# Patient Record
Sex: MALE | Race: WHITE | Employment: OTHER | URBAN - METROPOLITAN AREA
[De-identification: names, ages, dates, MRNs, and addresses within clinical notes are randomized per-mention and may not be internally consistent; named-entity substitution may affect disease eponyms.]

---

## 2018-05-18 ENCOUNTER — OFFICE VISIT (OUTPATIENT)
Dept: INTERNAL MEDICINE CLINIC | Age: 73
End: 2018-05-18

## 2018-05-18 ENCOUNTER — HOSPITAL ENCOUNTER (OUTPATIENT)
Dept: GENERAL RADIOLOGY | Age: 73
Discharge: HOME | End: 2018-05-18
Attending: INTERNAL MEDICINE
Payer: MEDICARE

## 2018-05-18 VITALS
WEIGHT: 165.38 LBS | DIASTOLIC BLOOD PRESSURE: 85 MMHG | HEART RATE: 56 BPM | BODY MASS INDEX: 24.5 KG/M2 | OXYGEN SATURATION: 97 % | HEIGHT: 69 IN | SYSTOLIC BLOOD PRESSURE: 138 MMHG | RESPIRATION RATE: 18 BRPM | TEMPERATURE: 98.6 F

## 2018-05-18 DIAGNOSIS — M54.42 CHRONIC LEFT-SIDED LOW BACK PAIN WITH LEFT-SIDED SCIATICA: ICD-10-CM

## 2018-05-18 DIAGNOSIS — G89.29 CHRONIC LEFT-SIDED LOW BACK PAIN WITH LEFT-SIDED SCIATICA: Primary | ICD-10-CM

## 2018-05-18 DIAGNOSIS — M54.42 CHRONIC LEFT-SIDED LOW BACK PAIN WITH LEFT-SIDED SCIATICA: Primary | ICD-10-CM

## 2018-05-18 DIAGNOSIS — G89.29 CHRONIC LEFT-SIDED LOW BACK PAIN WITH LEFT-SIDED SCIATICA: ICD-10-CM

## 2018-05-18 PROBLEM — Z85.038 HISTORY OF COLON CANCER: Status: ACTIVE | Noted: 2018-05-18

## 2018-05-18 PROCEDURE — 72100 X-RAY EXAM L-S SPINE 2/3 VWS: CPT

## 2018-05-18 PROCEDURE — 72220 X-RAY EXAM SACRUM TAILBONE: CPT

## 2018-05-18 RX ORDER — DICLOFENAC SODIUM 75 MG/1
75 TABLET, DELAYED RELEASE ORAL 2 TIMES DAILY
Qty: 60 TAB | Refills: 1 | Status: SHIPPED | OUTPATIENT
Start: 2018-05-18 | End: 2018-06-11 | Stop reason: ALTCHOICE

## 2018-05-18 RX ORDER — ROSUVASTATIN CALCIUM 10 MG/1
10 TABLET, COATED ORAL
Qty: 1 TAB | Refills: 1
Start: 2018-05-18 | End: 2018-05-18 | Stop reason: ALTCHOICE

## 2018-05-18 RX ORDER — ALPRAZOLAM 0.5 MG/1
0.5 TABLET ORAL
COMMUNITY
Start: 2018-03-12 | End: 2018-07-17 | Stop reason: SDUPTHER

## 2018-05-18 RX ORDER — DICLOFENAC SODIUM 75 MG/1
TABLET, DELAYED RELEASE ORAL
COMMUNITY
End: 2018-05-18 | Stop reason: ALTCHOICE

## 2018-05-18 RX ORDER — ROSUVASTATIN CALCIUM 10 MG/1
10 TABLET, FILM COATED ORAL
Qty: 1 TAB | Refills: 1
Start: 2018-05-18 | End: 2018-07-17 | Stop reason: SDUPTHER

## 2018-05-18 NOTE — PROGRESS NOTES
HISTORY OF PRESENT ILLNESS  Lamonte Stack is a 67 y.o. male. HPI  new to my practice  Transferring care from Dr. Liane Fine. Last evaluated there 1  week ago. Previous medical records are not available for my review at this visit. Low Back Pain:  Lamonte Stack is a 67 y.o. male who complains of low back pain on the left for 1 year(s) worsening over last month, is positional with walking, playing tennis/ pickle ball, with radiation down the L buttock. Severity of pain is 10 out of 10.  numbness, tingling, weakness is not present in left leg(s)/ foot. Precipitating factors: began with starting kick boxing 1 year ago. Prior history of back problems: no prior back problems. Self treatment:  Diclofenac used and beneficial .    The patient denies fevers, chills or sweats. The patient denies bowel/bladder incontinence and saddle numbness. .  Patient Active Problem List   Diagnosis Code    Coronary atherosclerosis of native coronary artery I25.10    Other and unspecified hyperlipidemia E78.5    Tonsil, pharyngeal J35.9    History of colon cancer Z85.038     Past Surgical History:   Procedure Laterality Date    CARDIAC SURG PROCEDURE UNLIST      cabg x 3, multiple stents. -     HX COLECTOMY      partial    HX HEENT      tonsillectomy    HX HERNIA REPAIR      bilateral inguinal    HX TURP      HX UROLOGICAL      TURP     Social History     Social History    Marital status:      Spouse name: N/A    Number of children: N/A    Years of education: N/A     Occupational History    Not on file.      Social History Main Topics    Smoking status: Former Smoker     Packs/day: 1.00     Years: 50.00     Quit date: 5/18/2012    Smokeless tobacco: Never Used    Alcohol use 3.0 oz/week     3 Cans of beer, 2 Standard drinks or equivalent per week    Drug use: No    Sexual activity: Not on file     Other Topics Concern    Not on file     Social History Narrative     Family History   Problem Relation Age of Onset    Heart Disease Father     Diabetes Mother     Heart Disease Paternal Grandfather     Cancer Maternal Grandmother      pancreatic     Current Outpatient Prescriptions   Medication Sig    ALPRAZolam (XANAX) 0.5 mg tablet Take 0.5 mg by mouth nightly as needed.  CRESTOR 10 mg tablet Take 1 Tab by mouth nightly. BRANDED IS REQUIRED  Indications: hyperlipidemia    diclofenac EC (VOLTAREN) 75 mg EC tablet Take 1 Tab by mouth two (2) times a day.  DIPHENHYDRAMINE HCL (UNISOM SLEEPGELS PO) Take  by mouth.  MELATONIN PO Take  by mouth.  Aspirin, Buffered 81 mg tab Take 162 mg by mouth nightly.  folic acid (FOLVITE) 1 mg tablet Take 1 mg by mouth nightly.  metoprolol-XL (TOPROL-XL) 25 mg XL tablet Take 25 mg by mouth nightly.  niacin (NIASPAN) 1,000 mg Tb24 tab Take 2,000 mg by mouth nightly.  omega-3 fatty acids-vitamin e (FISH OIL) 1,000 mg cap Take 1 Cap by mouth two (2) times a day.  multivitamins-minerals-lutein (CENTRUM SILVER) tab Take 1 Tab by mouth daily.  ascorbic acid (VITAMIN C) 500 mg tablet Take 500 mg by mouth daily. No current facility-administered medications for this visit. Allergies   Allergen Reactions    Rosuvastatin Myalgia     Generic rosuvastatin causes myalgias     Immunization History   Administered Date(s) Administered    Influenza Vaccine 10/01/2017         Review of Systems   Constitutional: Negative for malaise/fatigue. Respiratory: Negative for shortness of breath. Cardiovascular: Negative for chest pain, palpitations and leg swelling. Musculoskeletal: Positive for back pain. Negative for myalgias. No Muscle cramping   Neurological: Negative for tingling, sensory change, focal weakness, loss of consciousness and weakness. Physical Exam   Constitutional: He appears well-developed and well-nourished. No distress.    /85 (BP 1 Location: Right arm, BP Patient Position: Sitting)  Pulse (!) 56  Temp 98.6 °F (37 °C) (Oral)   Resp 18  Ht 5' 9\" (1.753 m)  Wt 165 lb 6 oz (75 kg)  SpO2 97%  BMI 24.42 kg/m2Body mass index is 24.42 kg/(m^2). HENT:   Mouth/Throat: Oropharynx is clear and moist.   Neck: No JVD present. Carotid bruit is not present. Cardiovascular: Normal rate, regular rhythm, normal heart sounds and intact distal pulses. Pulmonary/Chest: Effort normal and breath sounds normal.   Musculoskeletal: He exhibits no edema. Patient appears to be in mild pain, antalgic gait noted. Lumbosacral spine area reveals mild local tenderness. Painful LS ROM noted. Spinal alignment is normal.  Straight leg raise is negative at 90 degrees on both sides. Lumbosacral distribution DTR's, motor strength and sensation are normal, including heel and toe gait. Peripheral pulses are palpable and 2 plus. Prior studies inlcude: Lumbar spine X-Ray: not available. MRI not available     Neurological: He is alert. Skin: Skin is warm and dry. He is not diaphoretic. Nursing note and vitals reviewed. ASSESSMENT and PLAN  Diagnoses and all orders for this visit:    1. Chronic left-sided low back pain with left-sided sciatica - persistent/worsening over 1 year. No alarm symptoms or radiculopathic findings. Would like to return to pickle ball/ tennis. Continue nsaid, refer to PT, check Lumbar and sacral xrays.  -     [facility] [place]  -     XR SPINE LUMB 2 OR 3 V; Future  -     XR SACRUM AND COCCYX; Future  -     diclofenac EC (VOLTAREN) 75 mg EC tablet; Take 1 Tab by mouth two (2) times a day. Scot Tabor was counseled on causes and treatment of low back pain. he was educated on stretching and strengthening exercises. Written instructions for these were given. he was also counseled on medications like antiinflammatories, muscle relaxants, analgesics, heat or ice if they were prescribed.   he is advised to call our office immediately for any new symptoms like weakness, numbness or worsening pain should they develop. he will follow up with me if not improving over 2-4 weeks. Other orders  -     CRESTOR 10 mg tablet; Take 1 Tab by mouth nightly. BRANDED IS REQUIRED  Indications: hyperlipidemia      Follow-up Disposition:  Return in about 3 months (around 8/18/2018).

## 2018-05-18 NOTE — MR AVS SNAPSHOT
[facility] 
 
 
 [address] Shanna Orozco [address] 
[phone] Patient: Payam Dorado MRN: [id number] :1945 Visit Information Date & Time Provider Department Dept. Phone Encounter #  
 2018 11:00 AM Arielle Tim MD [facility] [address] [facility] [id number] Follow-up Instructions Return in about 3 months (around 2018). Upcoming Health Maintenance Date Due DTaP/Tdap/Td series (1 - Tdap) 1965 FOBT Q 1 YEAR AGE 50-75 1994 ZOSTER VACCINE AGE 60> 2004 GLAUCOMA SCREENING Q2Y 2009 Pneumococcal 65+ Low/Medium Risk (1 of 2 - PCV13) 2009 MEDICARE YEARLY EXAM 3/14/2018 Influenza Age 5 to Adult 2018 Allergies as of 2018  Review Complete On: 2018 By: Arielle Tim MD  
  
 Severity Noted Reaction Type Reactions Rosuvastatin  2018    Myalgia Generic rosuvastatin causes myalgias Current Immunizations  Never Reviewed Name Date Influenza Vaccine 10/1/2017 Not reviewed this visit You Were Diagnosed With   
  
 Codes Comments Chronic left-sided low back pain with left-sided sciatica    -  Primary ICD-10-CM: M54.42, G89.29 ICD-9-CM: 724.2, 724.3, 338.29 Vitals BP Pulse Temp Resp Height(growth percentile) Weight(growth percentile) 138/85 (BP 1 Location: Right arm, BP Patient Position: Sitting) (!) 56 98.6 °F (37 °C) (Oral) 18 5' 9\" (1.753 m) 165 lb 6 oz (75 kg) SpO2 BMI Smoking Status 97% 24.42 kg/m2 Former Smoker Vitals History BMI and BSA Data Body Mass Index Body Surface Area  
 24.42 kg/m 2 1.91 m 2 Preferred Pharmacy Pharmacy Name Phone [id number] [address], [address] [phone] Your Updated Medication List  
  
   
This list is accurate as of 18 11:59 AM.  Always use your most recent med list.  
  
  
  
  
 ALPRAZolam 0.5 mg tablet Commonly known as:  Angelica Corti Take 0.5 mg by mouth nightly as needed. aspirin, buffered 81 mg Tab Take 162 mg by mouth nightly. CENTRUM SILVER Tab tablet Generic drug:  multivitamins-minerals-lutein Take 1 Tab by mouth daily. CRESTOR 10 mg tablet Generic drug:  rosuvastatin Take 1 Tab by mouth nightly. BRANDED IS REQUIRED  Indications: hyperlipidemia  
  
 diclofenac EC 75 mg EC tablet Commonly known as:  VOLTAREN Take 1 Tab by mouth two (2) times a day. FISH OIL 1,000 mg Cap Generic drug:  omega-3 fatty acids-vitamin e Take 1 Cap by mouth two (2) times a day. folic acid 1 mg tablet Commonly known as:  [employer] Take 1 mg by mouth nightly. MELATONIN PO Take  by mouth.  
  
 metoprolol succinate 25 mg XL tablet Commonly known as:  TOPROL-XL Take 25 mg by mouth nightly. Niaspan 1,000 mg Tb24 tab Generic drug:  niacin Take 2,000 mg by mouth nightly. UNISOM SLEEPGELS PO Take  by mouth. VITAMIN C 500 mg tablet Generic drug:  ascorbic acid (vitamin C) Take 500 mg by mouth daily. Prescriptions Sent to Pharmacy Refills  
 diclofenac EC (VOLTAREN) 75 mg EC tablet 1 Sig: Take 1 Tab by mouth two (2) times a day. Class: Normal  
 Pharmacy: [address] [place] [address] #: [phone] Route: Oral  
  
We Performed the Following REFERRAL TO PHYSICAL THERAPY [LDV59 Custom] Follow-up Instructions Return in about 3 months (around 8/18/2018). To-Do List   
 05/18/2018 Imaging:  XR SACRUM AND COCCYX   
  
 05/18/2018 Imaging:  XR SPINE LUMB 2 OR 3 V Referral Information Referral ID Referred By Referred To  
  
 [id number] RICARDO, [address], [place] Phone: [phone] Visits Status Start Date End Date 1 New Request 5/18/18 5/18/19 If your referral has a status of pending review or denied, additional information will be sent to support the outcome of this decision. Introducing [address]! [facility] introduces [employer]ongo patient portal. Now you can access parts of your medical record, email your doctor's office, and request medication refills online. 1. In your internet browser, go to [email or web address] 2. Click on the First Time User? Click Here link in the Sign In box. You will see the New Member Sign Up page. 3. Enter your Zipongo Access Code exactly as it appears below. You will not need to use this code after youve completed the sign-up process. If you do not sign up before the expiration date, you must request a new code. · Zip[employer] Access Code: [email or web address] Expires: 8/16/2018 11:59 AM 
 
4. Enter the last four digits of your Social Security Number (xxxx) and Date of Birth (mm/dd/yyyy) as indicated and click Submit. You will be taken to the next sign-up page. 5. Create a Zipongo ID. This will be your Zipongo login ID and cannot be changed, so think of one that is secure and easy to remember. 6. Create a [employer] password. You can change your password at any time. 7. Enter your Password Reset Question and Answer. This can be used at a later time if you forget your password. 8. Enter your e-mail address. You will receive e-mail notification when new information is available in [address] 9. Click Sign Up. You can now view and download portions of your medical record. 10. Click the Download Summary menu link to download a portable copy of your medical information. If you have questions, please visit the Frequently Asked Questions section of the [employer] website. Remember, [employer]ongo is NOT to be used for urgent needs. For medical emergencies, dial 911. Now available from your iPhone and Android! Please provide this summary of care documentation to your next provider. Your primary care clinician is listed as Ibeth Andre. If you have any questions after today's visit, please call [phone].

## 2018-05-21 ENCOUNTER — PATIENT MESSAGE (OUTPATIENT)
Dept: INTERNAL MEDICINE CLINIC | Age: 73
End: 2018-05-21

## 2018-05-21 DIAGNOSIS — Z12.11 ENCOUNTER FOR SCREENING COLONOSCOPY: Primary | ICD-10-CM

## 2018-05-30 ENCOUNTER — HOSPITAL ENCOUNTER (OUTPATIENT)
Dept: PHYSICAL THERAPY | Age: 73
Discharge: HOME | End: 2018-05-30
Payer: MEDICARE

## 2018-05-30 PROCEDURE — G8978 MOBILITY CURRENT STATUS: HCPCS | Performed by: PHYSICAL THERAPIST

## 2018-05-30 PROCEDURE — 97110 THERAPEUTIC EXERCISES: CPT | Performed by: PHYSICAL THERAPIST

## 2018-05-30 PROCEDURE — G8979 MOBILITY GOAL STATUS: HCPCS | Performed by: PHYSICAL THERAPIST

## 2018-05-30 PROCEDURE — 97162 PT EVAL MOD COMPLEX 30 MIN: CPT | Performed by: PHYSICAL THERAPIST

## 2018-05-30 NOTE — PROGRESS NOTES
[address] [place] [address] Mikie Grady Flako Holden Adam Mora  Phone: [phone]  Fax: [phone]    Plan of Care/Statement of Necessity for Physical Therapy Services  2-15    Patient name: Jeaneth Moreau  : 1945  Provider#: [id number]  Referral source: Sheba Armenta MD      Medical/Treatment Diagnosis: Lumbago with sciatica, left side [M54.42]  Other chronic pain [G89.29]     Prior Hospitalization: see medical history     Comorbidities: heart disease, PMH of colon cancer, previous hamstring injury  Prior Level of Function: see initial eval  Medications: Verified on Patient Summary List  Start of Care: 17      Onset Date: May 2017   The Plan of Care and following information is based on the information from the initial evaluation. Assessment/ key information: Patient presents with chronic left-sided low back pain with recreational activities, including tennis and pickleball, being the chief aggravating factors. Today he presented with impaired lumbopelvic stability and decreased flexibility through B hips that could potentially be causing increased strain along the left lumbar spine with bending and twisting activities. He should do well with PT with a gradual improvement with both impairments leading to a return to PLOF. Evaluation Complexity History HIGH Complexity :3+ comorbidities / personal factors will impact the outcome/ POC ; Examination MEDIUM Complexity : 3 Standardized tests and measures addressing body structure, function, activity limitation and / or participation in recreation  ;Presentation MEDIUM Complexity : Evolving with changing characteristics  ; Clinical Decision Making MEDIUM Complexity : FOTO score of 26-74  Overall Complexity Rating: MEDIUM    Problem List: pain affecting function, decrease ROM, decrease strength, impaired gait/ balance, decrease ADL/ functional abilitiies, decrease activity tolerance, decrease flexibility/ joint mobility and decrease transfer abilities   Treatment Plan may include any combination of the following: Therapeutic exercise, Therapeutic activities, Neuromuscular re-education, Physical agent/modality, Manual therapy, Patient education, Self Care training, Functional mobility training, Home safety training and Stair training  Patient / Family readiness to learn indicated by: asking questions, trying to perform skills and interest  Persons(s) to be included in education: patient (P)  Barriers to Learning/Limitations: None  Patient Goal (s): I want to return to tennis as soon as possible.   Patient Self Reported Health Status: excellent  Rehabilitation Potential: excellent    Short Term Goals: To be accomplished in 8 treatments:  1. Patient will be able to perform all bed mobility tasks, such as rolling, with no pain or limitation. 2. Patient will be able to bend forward and tie his shoes with no pain or limitation. 3. Patient will be able to stand for 15 minutes without pain or limitation. Long Term Goals: To be accomplished in 16 treatments:  1. Patient will be able to squat down and  a 15# box from the floor with no pain or limitation. 2. Patient will be able to walk 1/2 mile with no pain or limitation. 3. Patient will be able to participate in a tennis match without being limited by low back pain. Frequency / Duration: Patient to be seen 2 times per week for 8 weeks. Patient/ Caregiver education and instruction: self care, activity modification and exercises    [x]  Plan of care has been reviewed with PTA    G-Codes (GP)  Mobility   Current  CK= 40-59%   Goal  CJ= 20-39%    The severity rating is based on clinical judgment and the FOTO Score score. Certification Period: 5/30/18 - 8/30/18    Vahid Prieto PT , DPT, OCS, Cert.  DN   5/30/2018 12:17 PM    ________________________________________________________________________    I certify that the above Therapy Services are being furnished while the patient is under my care. I agree with the treatment plan and certify that this therapy is necessary.     [facility] Signature:____________________  Date:____________Time: _________

## 2018-05-30 NOTE — PROGRESS NOTES
PT INITIAL EVALUATION NOTE - [facility] 2-15    Patient Name: Suze Goldstein  Date:2018  : 1945  [x]  Patient  Verified  Payor: Linda Lamb / Plan: [place] & B / Product Type: Medicare /    In time:10:30 AM  Out time:11:30 AM  Total Treatment Time (min): 60  Total Timed Codes (min): 30  1:1 Treatment Time ( only): 60   Visit #: 1     Treatment Area: Lumbago with sciatica, left side [M54.42]  Other chronic pain [G89.29]    SUBJECTIVE  Pain Level (0-10 scale): 1/10  Any medication changes, allergies to medications, adverse drug reactions, diagnosis change, or new procedure performed?: [] No    [x] Yes (see summary sheet for update)  Subjective:    Chronic L low back pain, occasional R glut medius tingling  PLOF: No limitations with standing, bending forward, twisting, playing recreational sports  Mechanism of Injury: Insidious, the first episode of pain occurred while participating in a kickboxing class in May 2017. The pain resolved, but then it returned as he began playing tennis and pickleball in late . Since then he has had several episodes of pain, which improves with rest, but then returns when he attempts to increase activity. The pain is located along the left low back without radiation. Occasionally he notices his right lateral hip becomes numb after sleeping on that side. Previous Treatment/Compliance: None. On a recent f/u with his PCP, he was referred to this clinic.   PMHx/Surgical Hx: Colon cancer with surgical resection, Heart disease, DDD at the lower lumbar spine  Patient also reports \"popping a tendon\" on the back of his leg last year, which required several months to heal and has left him with tightness at both hamstrings  Work Hx: Retired  Living Situation: lives at home with wife  Pt Goals: to reduce pain and improve mobility   Barriers: chronicity  Motivation: very motivated  Substance use: none  FABQ Score: elevated  Cognition: A & O x 3 OBJECTIVE/EXAMINATION  Posture: Increased anterior pelvic tilt, lumbar lordosis in standing  Other Observations:  Lower rib flare B, L>R  Gait and Functional Mobility:    Gait: WNL  Squat: unable to advance into last 25% of available ROM due to hamstring stiffness  Palpation: TTP along the left lower lumbar paraspinals  Joint Mobility: NT    Lumbar AROM: WNL, no pain reported     Aberrant movements:  Painful arc: [] Yes  [x] No  Instability catch: [] Yes  [x] No  Difficulty returning from flexion: [] Yes  [x] No  Reversal of lumbopelvic rhythm: [] Yes  [x] No              MMT:  All LE myotomes: >4/5  Core strength: 4/5, no pain  Neurological: Sensation: Intact  Special Tests:        Slump: Negative   Abhilash:  NT        Corey:  Positive bilaterally   SLR: Negative, R: 80, L: 65   LUICTA: Negative B       FAIR: Negative   Piriformis:Negative             Modality rationale: Patient declined.  \"It's never helped in the past.\"   Min Type Additional Details    [] Estim: []Att   []Unatt        []TENS instruct                  []IFC  []Premod   []NMES                     []Other:  []w/US   []w/ice   []w/heat  Position:  Location:    []  Traction: [] Cervical       []Lumbar                       [] Prone          []Supine                       []Intermittent   []Continuous Lbs:  [] before manual  [] after manual  []w/heat    []  Ultrasound: []Continuous   [] Pulsed at:                           []1MHz   []3MHz Location:  W/cm2:    [] Paraffin         Location:   []w/heat    []  Ice     []  Heat  []  Ice massage Position:  Location:    []  Laser  []  Other: Position:  Location:      []  Vasopneumatic Device Pressure:       [] lo [] med [] hi   Temperature:      [x] Skin assessment post-treatment:  [x]intact []redness- no adverse reaction    []redness - adverse reaction:     25 min Therapeutic Exercise:  [x] See flow sheet :   Rationale: increase ROM, increase strength and improve coordination to improve the patients ability to walk without pain          With   [] TE   [] TA   [] neuro   [] other: Patient Education: [x] Review HEP    [] Progressed/Changed HEP based on:   [] positioning   [] body mechanics   [] transfers   [] heat/ice application    [] other:      Other Objective/Functional Measures: Patient reported an increase in low back pain at the end of the session due to the rolling required for the different positions of the therapeutic exercise and the difficulty of Child's pose. Pain Level (0-10 scale) post treatment: 3    ASSESSMENT/Changes in Function:     [x]  See Plan of Delroy Sweeney, PT , DPT, OCS, Cert.  DN   5/30/2018  10:51 AM

## 2018-06-05 ENCOUNTER — HOSPITAL ENCOUNTER (OUTPATIENT)
Dept: PHYSICAL THERAPY | Age: 73
Discharge: HOME | End: 2018-06-05
Payer: MEDICARE

## 2018-06-05 PROCEDURE — 97110 THERAPEUTIC EXERCISES: CPT

## 2018-06-07 ENCOUNTER — HOSPITAL ENCOUNTER (OUTPATIENT)
Dept: PHYSICAL THERAPY | Age: 73
Discharge: HOME | End: 2018-06-07
Payer: MEDICARE

## 2018-06-07 PROCEDURE — G8979 MOBILITY GOAL STATUS: HCPCS | Performed by: PHYSICAL THERAPIST

## 2018-06-07 PROCEDURE — 97110 THERAPEUTIC EXERCISES: CPT

## 2018-06-07 PROCEDURE — G8980 MOBILITY D/C STATUS: HCPCS | Performed by: PHYSICAL THERAPIST

## 2018-06-07 NOTE — PROGRESS NOTES
PT DAILY TREATMENT NOTE - [facility] 2-15    Patient Name: Lamonte Stack  Date:2018  : 1945  [x]  Patient  Verified  Payor: [place] MEDICARE / Plan: [place] MEDICARE PART A & B / Product Type: Medicare /    In time:9:30a Out time:10:25a  Total Treatment Time (min): 55  Total Timed Codes (min): 55  1:1 Treatment Time ( only): 30   Visit #: 3     Treatment Area: Lumbago with sciatica, left side [M54.42]  Other chronic pain [G89.29]    SUBJECTIVE  Pain Level (0-10 scale): 3    Any medication changes, allergies to medications, adverse drug reactions, diagnosis change, or new procedure performed?: [x] No    [] Yes (see summary sheet for update)  Subjective functional status/changes:   [] No changes reported  Patient reports he was very sore after last session, but felt better this morning. Patient reports he took his pain medication this morning so he does not hurt right now. OBJECTIVE      55 min Therapeutic Exercise:  [x] See flow sheet :   Rationale: increase ROM, increase strength, improve coordination, improve balance and increase proprioception to improve the patients ability to return to activities without pain. With   [] TE   [] TA   [] neuro   [] other: Patient Education: [x] Review HEP    [] Progressed/Changed HEP based on:   [] positioning   [] body mechanics   [] transfers   [] heat/ice application    [] other:      Other Objective/Functional Measures: mox verbal cues for PPT, no pain with interventions, pt reports he feels better after the exercises today.      Pain Level (0-10 scale) post treatment: 0    ASSESSMENT/Changes in Function:     Patient will continue to benefit from skilled PT services to modify and progress therapeutic interventions, address functional mobility deficits, address ROM deficits, address strength deficits, analyze and address soft tissue restrictions, analyze and cue movement patterns, analyze and modify body mechanics/ergonomics and assess and modify postural abnormalities to attain remaining goals. []  See Plan of Care  []  See progress note/recertification  []  See Discharge Summary         Progress towards goals / Updated goals:  Patient demonstrates good tolerance for interventions and is able to advance several with no increased pain. Patient required mod verbal cues for proper mechanics and pain free range.     PLAN  [x]  Upgrade activities as tolerated     [x]  Continue plan of care  []  Update interventions per flow sheet       []  Discharge due to:_  []  Other:_      Christopher 6/7/2018  9:58 AM

## 2018-06-08 ENCOUNTER — TELEPHONE (OUTPATIENT)
Dept: INTERNAL MEDICINE CLINIC | Age: 73
End: 2018-06-08

## 2018-06-08 DIAGNOSIS — M54.30 SCIATICA, UNSPECIFIED LATERALITY: Primary | ICD-10-CM

## 2018-06-08 RX ORDER — TRAMADOL HYDROCHLORIDE 50 MG/1
50 TABLET ORAL
Qty: 30 TAB | Refills: 0 | OUTPATIENT
Start: 2018-06-08 | End: 2018-06-18 | Stop reason: SDUPTHER

## 2018-06-08 NOTE — TELEPHONE ENCOUNTER
He is failing usual therapy. He needs follow up evaluation with me on Monday. Can order tramadol meantime for pain. New medication or Refill ordered. Please phone prescription(s) to patients pharmacy as requested. Let him know.
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
## 2018-06-08 NOTE — TELEPHONE ENCOUNTER
Patient informed PCP stated tramadol meantime for pain was phoned in and he's scheduled for FU Monday at 2:15pm.

## 2018-06-11 ENCOUNTER — OFFICE VISIT (OUTPATIENT)
Dept: INTERNAL MEDICINE CLINIC | Age: 73
End: 2018-06-11

## 2018-06-11 ENCOUNTER — APPOINTMENT (OUTPATIENT)
Dept: PHYSICAL THERAPY | Age: 73
End: 2018-06-11
Payer: MEDICARE

## 2018-06-11 VITALS
SYSTOLIC BLOOD PRESSURE: 141 MMHG | WEIGHT: 164.38 LBS | HEART RATE: 68 BPM | RESPIRATION RATE: 18 BRPM | TEMPERATURE: 99 F | BODY MASS INDEX: 24.35 KG/M2 | HEIGHT: 69 IN | OXYGEN SATURATION: 95 % | DIASTOLIC BLOOD PRESSURE: 89 MMHG

## 2018-06-11 DIAGNOSIS — M54.17 LUMBOSACRAL RADICULOPATHY: Primary | ICD-10-CM

## 2018-06-11 RX ORDER — PREDNISONE 10 MG/1
TABLET ORAL
Qty: 21 TAB | Refills: 0 | Status: SHIPPED | OUTPATIENT
Start: 2018-06-11 | End: 2019-02-25

## 2018-06-11 RX ORDER — ZOLPIDEM TARTRATE 12.5 MG/1
TABLET, FILM COATED, EXTENDED RELEASE ORAL
Refills: 1 | COMMUNITY
Start: 2018-03-14 | End: 2019-01-10 | Stop reason: SDUPTHER

## 2018-06-11 NOTE — MR AVS SNAPSHOT
[address] [facility] 
 
 
 [address] [place] [address] [address] [phone] Patient: Lamonte Stack MRN: [id number] :1945 Visit Information Date & Time Provider Department Dept. Phone Encounter #  
 2018  2:15 PM Patricia Farias MD [facility] [id number] Upcoming Health Maintenance Date Due DTaP/Tdap/Td series (1 - Tdap) 1965 FOBT Q 1 YEAR AGE 50-75 1994 ZOSTER VACCINE AGE 60> 2004 GLAUCOMA SCREENING Q2Y 2009 Pneumococcal 65+ Low/Medium Risk (1 of 2 - PCV13) 2009 MEDICARE YEARLY EXAM 3/14/2018 Influenza Age 5 to Adult 2018 Allergies as of 2018  Review Complete On: 2018 By: Patricia Farias MD  
  
 Severity Noted Reaction Type Reactions Rosuvastatin  2018    Myalgia Generic rosuvastatin causes myalgias Current Immunizations  Never Reviewed Name Date Influenza Vaccine 10/1/2017 Not reviewed this visit You Were Diagnosed With   
  
 Codes Comments Lumbosacral radiculopathy    -  Primary ICD-10-CM: M54.17 ICD-9-CM: 724.4 Vitals BP Pulse Temp Resp Height(growth percentile) Weight(growth percentile) 141/89 (BP 1 Location: Left arm, BP Patient Position: Sitting) 68 99 °F (37.2 °C) (Oral) 18 5' 9\" (1.753 m) 164 lb 6 oz (74.6 kg) SpO2 BMI Smoking Status 95% 24.27 kg/m2 Former Smoker Vitals History BMI and BSA Data Body Mass Index Body Surface Area  
 24.27 kg/m 2 1.91 m 2 Preferred Pharmacy Pharmacy Name Phone [id number] [address], [address] [phone] Your Updated Medication List  
  
   
This list is accurate as of 18  3:02 PM.  Always use your most recent med list.  
  
  
  
  
 ALPRAZolam 0.5 mg tablet Commonly known as:  Claudine Belling Take 0.5 mg by mouth nightly as needed. aspirin, buffered 81 mg Tab Take 162 mg by mouth nightly. CENTRUM SILVER Tab tablet Generic drug:  multivitamins-minerals-lutein Take 1 Tab by mouth daily. CRESTOR 10 mg tablet Generic drug:  rosuvastatin Take 1 Tab by mouth nightly. BRANDED IS REQUIRED  Indications: hyperlipidemia FISH OIL 1,000 mg Cap Generic drug:  omega-3 fatty acids-vitamin e Take 1 Cap by mouth two (2) times a day. folic acid 1 mg tablet Commonly known as:  Google Take 1 mg by mouth nightly. MELATONIN PO Take  by mouth.  
  
 metoprolol succinate 25 mg XL tablet Commonly known as:  TOPROL-XL Take 25 mg by mouth nightly. Niaspan 1,000 mg Tb24 tab Generic drug:  niacin Take 2,000 mg by mouth nightly. predniSONE 10 mg dose pack Commonly known as:  STERAPRED DS See administration instruction per 10mg dose pack  
  
 traMADol 50 mg tablet Commonly known as:  ULTRAM  
Take 1 Tab by mouth every eight (8) hours as needed for Pain. Max Daily Amount: 150 mg. Do not take within 6 hours of alprazolam  
  
 UNISOM SLEEPGELS PO Take  by mouth. VITAMIN C 500 mg tablet Generic drug:  ascorbic acid (vitamin C) Take 500 mg by mouth daily. zolpidem CR 12.5 mg tablet Commonly known as:  AMBIEN CR  
TAKE 1 TABLET BY MOUTH AT BEDTIME AS NEEDED Prescriptions Sent to Pharmacy Refills  
 predniSONE (STERAPRED DS) 10 mg dose pack 0 Sig: See administration instruction per 10mg dose pack Class: Normal  
 Pharmacy: [address] [place] [address] Ph #: [phone] We Performed the Following REFERRAL TO ORTHOPEDICS [CFE415 Custom] To-Do List   
 06/14/2018 9:30 AM  
  Appointment with Jaime Haro at [facility]  [address] ([phone]) Please remember to arrive at the hospital at least 30 minutes prior to your scheduled appointment time.   When you come in for your appointment, please be sure to bring the therapy prescription the doctor gave you, your insurance card, and a list of the medicines you are taking. Also, please remember to wear comfortable, loose- fitting clothes. We look forward to seeing you.  
  
 06/19/2018 9:30 AM  
  Appointment with Kylee Ramos at [facility] PT [address] ([phone]) Please remember to arrive at the hospital at least 30 minutes prior to your scheduled appointment time. When you come in for your appointment, please be sure to bring the therapy prescription the doctor gave you, your insurance card, and a list of the medicines you are taking. Also, please remember to wear comfortable, loose- fitting clothes. We look forward to seeing you.  
  
 06/22/2018 Imaging:  MRI LUMB SPINE WO CONT   
  
 06/22/2018 9:30 AM  
  Appointment with Shaw Matos PT at [facility] PT [facility] ([phone]) Please remember to arrive at the hospital at least 30 minutes prior to your scheduled appointment time. When you come in for your appointment, please be sure to bring the therapy prescription the doctor gave you, your insurance card, and a list of the medicines you are taking. Also, please remember to wear comfortable, loose- fitting clothes. We look forward to seeing you. Referral Information Referral ID Referred By Referred To  
  
 [id number] [address], [address] [facility]. [address] Phone: [phone] Fax: [phone] Visits Status Start Date End Date 1 New Request 6/11/18 6/11/19 If your referral has a status of pending review or denied, additional information will be sent to support the outcome of this decision. Introducing [facility]! Dear Radha Fletcher: Thank you for requesting a [facility] account. Our records indicate that you already have an active [facility] account.   You can access your account anytime at [email or web address] Did you know that you can access your hospital and ER discharge instructions at any time in [employer]? You can also review all of your test results from your hospital stay or ER visit. Additional Information If you have questions, please visit the Frequently Asked Questions section of the [employer] website at [email or web address] Remember, [employer] is NOT to be used for urgent needs. For medical emergencies, dial 911. Now available from your iPhone and Android! Please provide this summary of care documentation to your next provider. Your primary care clinician is listed as Anali Cope. If you have any questions after today's visit, please call [phone].

## 2018-06-12 ENCOUNTER — APPOINTMENT (OUTPATIENT)
Dept: PHYSICAL THERAPY | Age: 73
End: 2018-06-12
Payer: MEDICARE

## 2018-06-14 ENCOUNTER — HOSPITAL ENCOUNTER (OUTPATIENT)
Dept: MRI IMAGING | Age: 73
Discharge: HOME | End: 2018-06-14
Attending: INTERNAL MEDICINE
Payer: MEDICARE

## 2018-06-14 DIAGNOSIS — M54.17 LUMBOSACRAL RADICULOPATHY: ICD-10-CM

## 2018-06-14 PROCEDURE — 72148 MRI LUMBAR SPINE W/O DYE: CPT

## 2018-06-14 NOTE — PROGRESS NOTES
I have attempted without success to contact this patient by phone to discuss lab results and will try again later.

## 2018-06-15 ENCOUNTER — TELEPHONE (OUTPATIENT)
Dept: INTERNAL MEDICINE CLINIC | Age: 73
End: 2018-06-15

## 2018-06-15 DIAGNOSIS — G95.89 LUMBAR EPIDURAL MASS (HCC): Primary | ICD-10-CM

## 2018-06-15 NOTE — TELEPHONE ENCOUNTER
I reviewed MRI results with pt. Large epidural mass. He needs neurosurgical evaluation ASAP next week. I recommended Dr. Jaelyn Beck. Pt will try to make appt and if unsuccessful, we'll arrange for him.

## 2018-06-18 ENCOUNTER — TELEPHONE (OUTPATIENT)
Dept: INTERNAL MEDICINE CLINIC | Age: 73
End: 2018-06-18

## 2018-06-18 DIAGNOSIS — M54.30 SCIATICA, UNSPECIFIED LATERALITY: ICD-10-CM

## 2018-06-18 RX ORDER — TRAMADOL HYDROCHLORIDE 50 MG/1
50 TABLET ORAL
Qty: 30 TAB | Refills: 0 | Status: CANCELLED | OUTPATIENT
Start: 2018-06-18

## 2018-06-18 RX ORDER — TRAMADOL HYDROCHLORIDE 50 MG/1
100 TABLET ORAL
Qty: 90 TAB | Refills: 0 | OUTPATIENT
Start: 2018-06-18 | End: 2018-06-29 | Stop reason: SDUPTHER

## 2018-06-18 NOTE — TELEPHONE ENCOUNTER
I have attempted without success to contact this patient by phone to confirm appt with Dr. Elena Camara has been made and I left a message on answering machine.

## 2018-06-18 NOTE — TELEPHONE ENCOUNTER
----- Message from Susu Mariscal LPN sent at 6/48/1251  7:50 AM EDT -----  Regarding: FW: Prescription Question  Contact: [phone]  Refill request.   ----- Message -----     From: Carrie Malave     Sent: 6/17/2018   6:53 PM       To: [facility]  Subject: Prescription Question                            Dr. Kavon Beth,    Today I will take the last of the Tramadol 50 mg prescription. Yesterday I took the last of the Prednisone dose pack. I need a refill of the pain medication - ASAP. The 50 MG Tramadol has NOT BEEN very EFFECTIVE at relieving the pain. Can you INCREASE the DOSAGE? I know that one of the principles of the Hippocratic Oath is first, do no harm, and if you think increasing the dosage might be harmful, can you have me ADMITTED to the HOSPITAL? The pain is so intense that I cannot sleep. I usually wake between 1 and 3 and cannot go back to sleep. The Unisom and other non-prescription sleep aids have no effect and now that I cannot take the alprazolam, I haven't had a full night's sleep in days. HELP !      Justin Barker

## 2018-06-18 NOTE — TELEPHONE ENCOUNTER
He can increase tramadol to 100mg tid. Please let him know and confirm appt with Dr. Velasquez Ket -date? New medication or Refill ordered. Please phone prescription(s) to patients pharmacy as requested.

## 2018-06-18 NOTE — TELEPHONE ENCOUNTER
----- Message from Dory Morin LPN sent at 8/79/2304  7:50 AM EDT -----  Regarding: FW: Prescription Question  Contact: [phone]  Refill request.   ----- Message -----     From: Antony Hart     Sent: 6/17/2018   6:53 PM       To: [facility] [place]  Subject: Prescription Question                            Dr. Yuri Ureña,    Today I will take the last of the Tramadol 50 mg prescription. Yesterday I took the last of the Prednisone dose pack. I need a refill of the pain medication - ASAP. The 50 MG Tramadol has NOT BEEN very EFFECTIVE at relieving the pain. Can you INCREASE the DOSAGE? I know that one of the principles of the Hippocratic Oath is first, do no harm, and if you think increasing the dosage might be harmful, can you have me ADMITTED to the HOSPITAL? The pain is so intense that I cannot sleep. I usually wake between 1 and 3 and cannot go back to sleep. The Unisom and other non-prescription sleep aids have no effect and now that I cannot take the alprazolam, I haven't had a full night's sleep in days. HELP !      Justin Barker

## 2018-06-18 NOTE — TELEPHONE ENCOUNTER
Patient is out of Tramdol . Please refill for more pain medication . .. Is not seeing Neuro Surgeon until Thursday .  Send email yesterday and has not heard back /. Please call Asap at [phone]

## 2018-06-18 NOTE — TELEPHONE ENCOUNTER
----- Message from Cary Amos LPN sent at 5/27/2817  7:50 AM EDT -----  Regarding: FW: Prescription Question  Contact: [phone]  Refill request.   ----- Message -----     From: Shaun Tate     Sent: 6/17/2018   6:53 PM       To: [facility] [place]  Subject: Prescription Question                            Dr. Praful Katz,    Today I will take the last of the Tramadol 50 mg prescription. Yesterday I took the last of the Prednisone dose pack. I need a refill of the pain medication - ASAP. The 50 MG Tramadol has NOT BEEN very EFFECTIVE at relieving the pain. Can you INCREASE the DOSAGE? I know that one of the principles of the Hippocratic Oath is first, do no harm, and if you think increasing the dosage might be harmful, can you have me ADMITTED to the HOSPITAL? The pain is so intense that I cannot sleep. I usually wake between 1 and 3 and cannot go back to sleep. The Unisom and other non-prescription sleep aids have no effect and now that I cannot take the alprazolam, I haven't had a full night's sleep in days. HELP !      Justin Barker

## 2018-06-23 ENCOUNTER — HOSPITAL ENCOUNTER (OUTPATIENT)
Dept: MRI IMAGING | Age: 73
Discharge: HOME | End: 2018-06-23
Attending: NEUROLOGICAL SURGERY
Payer: MEDICARE

## 2018-06-23 VITALS — BODY MASS INDEX: 24.37 KG/M2 | WEIGHT: 165 LBS

## 2018-06-23 DIAGNOSIS — D49.7 SPINAL CORD TUMOR: ICD-10-CM

## 2018-06-23 PROCEDURE — 74011250636 HC RX REV CODE- 250/636: Performed by: NEUROLOGICAL SURGERY

## 2018-06-23 PROCEDURE — A9575 INJ GADOTERATE MEGLUMI 0.1ML: HCPCS | Performed by: NEUROLOGICAL SURGERY

## 2018-06-23 PROCEDURE — 72149 MRI LUMBAR SPINE W/DYE: CPT

## 2018-06-23 RX ORDER — GADOTERATE MEGLUMINE 376.9 MG/ML
14 INJECTION INTRAVENOUS
Status: COMPLETED | OUTPATIENT
Start: 2018-06-23 | End: 2018-06-23

## 2018-06-23 RX ADMIN — GADOTERATE MEGLUMINE 14 ML: 376.9 INJECTION INTRAVENOUS at 11:29

## 2018-06-23 NOTE — PROGRESS NOTES
Discussed with RN (April). Per ordering MD/PA, MRI on hold at this time.   Exam will be re-ordered when pt can tolerate exam.

## 2018-06-29 ENCOUNTER — TELEPHONE (OUTPATIENT)
Dept: INTERNAL MEDICINE CLINIC | Age: 73
End: 2018-06-29

## 2018-06-29 DIAGNOSIS — M54.30 SCIATICA, UNSPECIFIED LATERALITY: ICD-10-CM

## 2018-06-29 RX ORDER — TRAMADOL HYDROCHLORIDE 50 MG/1
100 TABLET ORAL
Qty: 20 TAB | Refills: 0 | OUTPATIENT
Start: 2018-06-29 | End: 2019-02-25

## 2018-06-29 NOTE — TELEPHONE ENCOUNTER
----- Message from Adriel Ortega LPN sent at 1/76/2830  7:40 AM EDT -----  Regarding: FW: Prescription Question  Refill request.  ----- Message -----     From: Cris Kenyon     Sent: 6/28/2018   7:55 AM       To: [facility]  Subject: Prescription Question                            Good Morning,    I'm pretty sure that Dr. Jorge Martinez has passed this information to you but in the event that it hasn't yet arrived . . .  I am scheduled for the spinal procedure (whatever it entails) on Thursday, July 5th. It is seven days out. I have enough Tramadol to last 5 2/3 days. Granted, I will not require Tramadol for the entire 7th day but if you could prescribe 10 pills that would get me through the morning of July the 5th.   My procedure is scheduled 11:30am.      Justin Barker Bedside, Verbal and Written shift change report given to Catrina Waldrop RN (oncoming nurse) by Yenny Arias. Kymberly Yates LPN (offgoing nurse). Report included the following information SBAR, Kardex, Intake/Output and MAR.

## 2018-07-16 ENCOUNTER — PATIENT MESSAGE (OUTPATIENT)
Dept: INTERNAL MEDICINE CLINIC | Age: 73
End: 2018-07-16

## 2018-07-16 DIAGNOSIS — F41.9 ANXIETY: Primary | ICD-10-CM

## 2018-07-17 DIAGNOSIS — F41.9 ANXIETY: ICD-10-CM

## 2018-07-17 RX ORDER — ROSUVASTATIN CALCIUM 10 MG/1
10 TABLET, FILM COATED ORAL
Qty: 90 TAB | Refills: 1 | Status: SHIPPED | OUTPATIENT
Start: 2018-07-17 | End: 2018-07-17 | Stop reason: SDUPTHER

## 2018-07-17 RX ORDER — FOLIC ACID 1 MG/1
1 TABLET ORAL
Qty: 90 TAB | Refills: 1 | Status: SHIPPED | OUTPATIENT
Start: 2018-07-17 | End: 2019-01-10 | Stop reason: SDUPTHER

## 2018-07-17 RX ORDER — NIACIN 1000 MG/1
2000 TABLET, EXTENDED RELEASE ORAL
Qty: 90 TAB | Refills: 1 | Status: SHIPPED | OUTPATIENT
Start: 2018-07-17 | End: 2018-10-15 | Stop reason: SDUPTHER

## 2018-07-17 RX ORDER — ROSUVASTATIN CALCIUM 10 MG/1
10 TABLET, FILM COATED ORAL
Qty: 90 TAB | Refills: 1 | Status: SHIPPED | OUTPATIENT
Start: 2018-07-17 | End: 2019-01-10 | Stop reason: SDUPTHER

## 2018-07-17 RX ORDER — METOPROLOL SUCCINATE 25 MG/1
25 TABLET, EXTENDED RELEASE ORAL
Qty: 90 TAB | Refills: 1 | Status: SHIPPED | OUTPATIENT
Start: 2018-07-17 | End: 2019-01-10 | Stop reason: SDUPTHER

## 2018-07-17 RX ORDER — NIACIN 1000 MG/1
2000 TABLET, EXTENDED RELEASE ORAL
Qty: 90 TAB | Refills: 1 | Status: SHIPPED | OUTPATIENT
Start: 2018-07-17 | End: 2018-07-17 | Stop reason: SDUPTHER

## 2018-07-17 RX ORDER — ZOLPIDEM TARTRATE 12.5 MG/1
12.5 TABLET, FILM COATED, EXTENDED RELEASE ORAL
Qty: 30 TAB | Refills: 1 | OUTPATIENT
Start: 2018-07-17

## 2018-07-17 RX ORDER — ALPRAZOLAM 0.5 MG/1
0.5 TABLET ORAL
Qty: 30 TAB | Refills: 1 | OUTPATIENT
Start: 2018-07-17 | End: 2019-01-10 | Stop reason: SDUPTHER

## 2018-07-17 NOTE — TELEPHONE ENCOUNTER
Patient requesting the Crestor and niacin go to mail order instead of the local pharamcy. New prescriptions sent as prescribed to [facility].

## 2018-07-23 ENCOUNTER — TELEPHONE (OUTPATIENT)
Dept: INTERNAL MEDICINE CLINIC | Age: 73
End: 2018-07-23

## 2018-07-23 NOTE — TELEPHONE ENCOUNTER
----- Message from Griselda Peterson LPN sent at 3/46/6670  9:48 AM EDT -----  Regarding: FW: Prescription Question  Medication question.  ----- Message -----     From: Colleen Florentino     Sent: 7/20/2018   3:34 PM       To: [facility]  Subject: Prescription Question                            I received 212 Medication Alerts from BC/BS. 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 Very Serious. Call your doctor's office as soon as possible. 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

## 2018-08-16 NOTE — PROGRESS NOTES
[address] [place] [facility] [address] [address], [address].  Phone: [phone] Fax: [phone]      Discharge Summary 2-15    Patient name: Cheng Richards  : 1945  Provider#: [id number]  Referral source: Arnaldo Hutotn MD      Medical/Treatment Diagnosis: Lumbago with sciatica, left side [M54.42]  Other chronic pain [G89.29]     Prior Hospitalization: see medical history     Comorbidities: See Plan of Care  Prior Level of Function: See Plan of Care  Medications: Verified on Patient Summary List    Start of Care: 18      Onset Date:May 2017   Visits from Start of Care: 3     Missed Visits: 0  Reporting Period : 18 to 18    Assessment/Summary of care: Mr. Lc Hendrix was evaluated on 18 for his chronic left-sided low back pain and was treated with therapeutic exercises and modalities for pain control. He returned for two f/u visits, but did not report an improvement in his low back pain. He was referred back to his physician, who upon MRI imaging, identified a spinal tumor. He will be discharged from PT for further testing and treatment that is out of the scope of PT practice. Short Term Goals: To be accomplished in 8 treatments:  1. Patient will be able to perform all bed mobility tasks, such as rolling, with no pain or limitation. Not met. 2. Patient will be able to bend forward and tie his shoes with no pain or limitation. Not met. 3. Patient will be able to stand for 15 minutes without pain or limitation. Not met. Long Term Goals: To be accomplished in 16 treatments:  1. Patient will be able to squat down and  a 15# box from the floor with no pain or limitation. Not met. 2. Patient will be able to walk 1/2 mile with no pain or limitation. Not met. 3. Patient will be able to participate in a tennis match without being limited by low back pain. Not met.     G-Codes (GP)  Mobility   [id number] Goal  CJ= 20-39%  D/C  CK= 40-59%    The severity rating is based on clinical judgment and the FOTO Score score. RECOMMENDATIONS:  [x]Discontinue therapy: []Patient has reached or is progressing toward set goals     []Patient is non-compliant or has abdicated     []Due to lack of appreciable progress towards set goals     [x]Other  Brain Harada, PT , DPT, OCS, Cert.  DN 8/16/2018 12:11 PM

## 2018-10-15 ENCOUNTER — TELEPHONE (OUTPATIENT)
Dept: INTERNAL MEDICINE CLINIC | Age: 73
End: 2018-10-15

## 2018-10-15 RX ORDER — NIACIN 1000 MG/1
2000 TABLET, EXTENDED RELEASE ORAL
Qty: 180 TAB | Refills: 1 | Status: SHIPPED | OUTPATIENT
Start: 2018-10-15 | End: 2018-10-15 | Stop reason: SDUPTHER

## 2018-10-15 RX ORDER — NIACIN 1000 MG/1
2000 TABLET, EXTENDED RELEASE ORAL
Qty: 180 TAB | Refills: 1 | Status: SHIPPED | OUTPATIENT
Start: 2018-10-15 | End: 2019-01-10 | Stop reason: SDUPTHER

## 2018-10-15 NOTE — TELEPHONE ENCOUNTER
Pt was only given # 45 of Niaspan 1000 mg , take 2 tablets at bedtime , new script was called in for 90 days per pt's request.

## 2018-10-15 NOTE — TELEPHONE ENCOUNTER
----- Message from Elissa Siu sent at 10/15/2018  3:19 PM EDT -----  Regarding: Dr. Rangel/ Refill  Pt had Niacin ER Rx called into local pharmacy by error, needs to be called into [facility] [phone]. Requesting 2 90 day supply.  Best contact [phone]

## 2018-12-05 ENCOUNTER — TELEPHONE (OUTPATIENT)
Dept: INTERNAL MEDICINE CLINIC | Age: 73
End: 2018-12-05

## 2018-12-05 NOTE — TELEPHONE ENCOUNTER
----- Message from Amelie Park sent at 12/5/2018  1:42 PM EST -----  Regarding: /Telephone  Pt is requesting a call back regarding his options on obtaining a referral for Physical Therapy due to back pain.     Best contact:[phone]

## 2018-12-06 NOTE — TELEPHONE ENCOUNTER
TORI with wife requesting clarification. Patient was refer earlier this year at [facility]. He can go anywhere.

## 2019-01-10 DIAGNOSIS — F41.9 ANXIETY: ICD-10-CM

## 2019-01-10 DIAGNOSIS — G47.00 INSOMNIA, UNSPECIFIED TYPE: Primary | ICD-10-CM

## 2019-01-10 NOTE — TELEPHONE ENCOUNTER
Patient request refill enough to get to appointment 03/26. Patient request to pick - up hard copy of the prescriptions & would like to  on 01/11/19.  Patient request a call when Rx's are ready for pick - up @ - thank you

## 2019-01-11 DIAGNOSIS — G47.00 INSOMNIA, UNSPECIFIED TYPE: ICD-10-CM

## 2019-01-11 DIAGNOSIS — F41.9 ANXIETY: ICD-10-CM

## 2019-01-11 RX ORDER — METOPROLOL SUCCINATE 25 MG/1
25 TABLET, EXTENDED RELEASE ORAL
Qty: 90 TAB | Refills: 0 | Status: SHIPPED | OUTPATIENT
Start: 2019-01-11 | End: 2019-02-25 | Stop reason: SDUPTHER

## 2019-01-11 RX ORDER — ALPRAZOLAM 0.5 MG/1
0.5 TABLET ORAL
Qty: 30 TAB | Refills: 0 | Status: SHIPPED | OUTPATIENT
Start: 2019-01-11 | End: 2019-01-11 | Stop reason: SDUPTHER

## 2019-01-11 RX ORDER — FOLIC ACID 1 MG/1
1 TABLET ORAL
Qty: 90 TAB | Refills: 0 | Status: SHIPPED | OUTPATIENT
Start: 2019-01-11 | End: 2019-02-25 | Stop reason: SDUPTHER

## 2019-01-11 RX ORDER — NIACIN 1000 MG/1
2000 TABLET, EXTENDED RELEASE ORAL
Qty: 180 TAB | Refills: 0 | Status: SHIPPED | OUTPATIENT
Start: 2019-01-11 | End: 2019-02-25 | Stop reason: SDUPTHER

## 2019-01-11 RX ORDER — METOPROLOL SUCCINATE 25 MG/1
25 TABLET, EXTENDED RELEASE ORAL
Qty: 90 TAB | Refills: 0 | Status: SHIPPED | OUTPATIENT
Start: 2019-01-11 | End: 2019-01-11 | Stop reason: SDUPTHER

## 2019-01-11 RX ORDER — ALPRAZOLAM 0.5 MG/1
0.5 TABLET ORAL
Qty: 30 TAB | Refills: 0 | Status: SHIPPED | OUTPATIENT
Start: 2019-01-11 | End: 2019-02-25 | Stop reason: SDUPTHER

## 2019-01-11 RX ORDER — ZOLPIDEM TARTRATE 12.5 MG/1
12.5 TABLET, FILM COATED, EXTENDED RELEASE ORAL
Qty: 30 TAB | Refills: 0 | Status: SHIPPED | OUTPATIENT
Start: 2019-01-11 | End: 2019-02-25 | Stop reason: SDUPTHER

## 2019-01-11 RX ORDER — FOLIC ACID 1 MG/1
1 TABLET ORAL
Qty: 90 TAB | Refills: 0 | Status: SHIPPED | OUTPATIENT
Start: 2019-01-11 | End: 2019-01-11 | Stop reason: SDUPTHER

## 2019-01-11 RX ORDER — ZOLPIDEM TARTRATE 12.5 MG/1
12.5 TABLET, FILM COATED, EXTENDED RELEASE ORAL
Qty: 30 TAB | Refills: 0 | Status: SHIPPED | OUTPATIENT
Start: 2019-01-11 | End: 2019-01-11 | Stop reason: SDUPTHER

## 2019-01-11 RX ORDER — NIACIN 1000 MG/1
2000 TABLET, EXTENDED RELEASE ORAL
Qty: 180 TAB | Refills: 0 | Status: SHIPPED | OUTPATIENT
Start: 2019-01-11 | End: 2019-01-11 | Stop reason: SDUPTHER

## 2019-01-11 RX ORDER — ROSUVASTATIN CALCIUM 10 MG/1
10 TABLET, FILM COATED ORAL
Qty: 90 TAB | Refills: 0 | Status: SHIPPED | OUTPATIENT
Start: 2019-01-11 | End: 2019-01-11 | Stop reason: SDUPTHER

## 2019-01-11 RX ORDER — ROSUVASTATIN CALCIUM 10 MG/1
10 TABLET, FILM COATED ORAL
Qty: 90 TAB | Refills: 0 | Status: SHIPPED | OUTPATIENT
Start: 2019-01-11 | End: 2019-02-25 | Stop reason: SDUPTHER

## 2019-01-11 NOTE — TELEPHONE ENCOUNTER
Refills approved by  [facility]. Only 30 for Zolpidem and Alprazolam with no additional refills.

## 2019-01-11 NOTE — TELEPHONE ENCOUNTER
Transferred care from Dr. Yeimi Christiansen in May 2018.  checked today  Zolpidem ER 12.5mg last filled 10/31/18 for 30 tablets/30 day supply  Alprazolam 0.5mg last filled 7/17/18 for 30 tablets/30 day supply    Patient scheduled with Dr. Felix Cain March 26th and request to  these hard copy prescriptions today.

## 2019-01-18 ENCOUNTER — DOCUMENTATION ONLY (OUTPATIENT)
Dept: INTERNAL MEDICINE CLINIC | Age: 74
End: 2019-01-18

## 2019-02-25 ENCOUNTER — OFFICE VISIT (OUTPATIENT)
Dept: INTERNAL MEDICINE CLINIC | Age: 74
End: 2019-02-25

## 2019-02-25 VITALS
RESPIRATION RATE: 16 BRPM | BODY MASS INDEX: 25.62 KG/M2 | OXYGEN SATURATION: 96 % | WEIGHT: 173 LBS | DIASTOLIC BLOOD PRESSURE: 68 MMHG | TEMPERATURE: 98.1 F | HEART RATE: 58 BPM | SYSTOLIC BLOOD PRESSURE: 110 MMHG | HEIGHT: 69 IN

## 2019-02-25 DIAGNOSIS — Z85.038 HISTORY OF COLON CANCER: ICD-10-CM

## 2019-02-25 DIAGNOSIS — I10 HYPERTENSION, UNSPECIFIED TYPE: Primary | ICD-10-CM

## 2019-02-25 DIAGNOSIS — G89.29 CHRONIC BILATERAL LOW BACK PAIN WITHOUT SCIATICA: ICD-10-CM

## 2019-02-25 DIAGNOSIS — G47.00 INSOMNIA, UNSPECIFIED TYPE: ICD-10-CM

## 2019-02-25 DIAGNOSIS — E78.49 OTHER HYPERLIPIDEMIA: ICD-10-CM

## 2019-02-25 DIAGNOSIS — M54.50 CHRONIC BILATERAL LOW BACK PAIN WITHOUT SCIATICA: ICD-10-CM

## 2019-02-25 DIAGNOSIS — I25.10 ATHEROSCLEROSIS OF NATIVE CORONARY ARTERY OF NATIVE HEART WITHOUT ANGINA PECTORIS: ICD-10-CM

## 2019-02-25 DIAGNOSIS — F41.9 ANXIETY: ICD-10-CM

## 2019-02-25 RX ORDER — FOLIC ACID 1 MG/1
1 TABLET ORAL
Qty: 90 TAB | Refills: 1 | Status: SHIPPED | OUTPATIENT
Start: 2019-02-25 | End: 2019-12-11 | Stop reason: SDUPTHER

## 2019-02-25 RX ORDER — ZOLPIDEM TARTRATE 12.5 MG/1
12.5 TABLET, FILM COATED, EXTENDED RELEASE ORAL
Qty: 30 TAB | Refills: 2 | Status: SHIPPED | OUTPATIENT
Start: 2019-02-25 | End: 2019-09-22 | Stop reason: DRUGHIGH

## 2019-02-25 RX ORDER — NIACIN 1000 MG/1
2000 TABLET, EXTENDED RELEASE ORAL
Qty: 180 TAB | Refills: 1 | Status: SHIPPED | OUTPATIENT
Start: 2019-02-25 | End: 2019-12-11 | Stop reason: SDUPTHER

## 2019-02-25 RX ORDER — ALPRAZOLAM 0.5 MG/1
0.5 TABLET ORAL
Qty: 30 TAB | Refills: 1 | Status: SHIPPED | OUTPATIENT
Start: 2019-02-25 | End: 2019-12-16 | Stop reason: SDUPTHER

## 2019-02-25 RX ORDER — ROSUVASTATIN CALCIUM 10 MG/1
10 TABLET, FILM COATED ORAL
Qty: 90 TAB | Refills: 1 | Status: SHIPPED | OUTPATIENT
Start: 2019-02-25 | End: 2019-12-06 | Stop reason: SDUPTHER

## 2019-02-25 RX ORDER — METOPROLOL SUCCINATE 25 MG/1
25 TABLET, EXTENDED RELEASE ORAL
Qty: 90 TAB | Refills: 1 | Status: SHIPPED | OUTPATIENT
Start: 2019-02-25 | End: 2019-10-10 | Stop reason: SDUPTHER

## 2019-02-25 NOTE — PROGRESS NOTES
Sunshine Saxena is a 75 y.o. male who presents today for Medication Evaluation; Anxiety; Insomnia; Cholesterol Problem; and Hypertension Frutoso Golder He has a history of  
Patient Active Problem List  
Diagnosis Code  Coronary atherosclerosis of native coronary artery I25.10  Other and unspecified hyperlipidemia E78.5  Tonsil, pharyngeal J35.9  History of colon cancer Z85.038  
 Hypertension I10 Frutoso Golder Today patient is here for f/u. This is a patient of Dr. Delicia Greene. He presents with multiple complaints and concerns. We will make sure that his current issues are stable and he will need to reestablish with a new provider. He will be seeing the orthopedist for a left hand cyst vs. Lipoma. Lower back pain: lumbar pain. Worse when waking up. Improves with use. Had L spine mass removed in June 2018. Lower back problems are improved since. Notes that neuropathy resolved after this. Does not take anything for this. No changes in symptoms. No loss of bladder or bowel function. He still has a physical therapy referral from his neurosurgeon I discussed that this would be a good first step. Anxiety: PRN xanax.  verified and not taking very often. He notes that this developed after his colorectal cancer. Insomnia: PRN Ambien. Discussed sleep hygiene. Patient notes that he wakes up early and then is bored. He notes no excessive sleepiness during the day. I voiced my concern over long-term use with Ambien Hypertension/CAD: was seeing Cardiologist. None since moving here. Had MI's in the past.  
Hypertension ROS: taking medications as instructed, no medication side effects noted, no TIA's, no chest pain on exertion, no dyspnea on exertion, no swelling of ankles     reports that he quit smoking about 6 years ago. He has a 50.00 pack-year smoking history. he has never used smokeless tobacco.    reports that he drinks about 3.0 oz of alcohol per week. BP Readings from Last 2 Encounters: 02/25/19 110/68  
06/11/18 141/89 Hyperlipidemia Taking both niacin and Crestor ROS: taking medications as instructed, no medication side effects noted No new myalgias, no joint pains, no weakness No TIA's, no chest pain on exertion, no dyspnea on exertion, no swelling of ankles. No results found for: CHOL, CHOLPOCT, CHOLX, CHLST, CHOLV, HDL, HDLPOC, LDL, LDLCPOC, LDLC, DLDLP, VLDLC, VLDL, TGLX, TRIGL, TRIGP, TGLPOCT, CHHD, CHHDX 
 
 
ROS Review of Systems Constitutional: Positive for malaise/fatigue. Negative for chills, fever and weight loss. HENT: Negative for congestion, ear discharge, ear pain, hearing loss, sore throat and tinnitus. Eyes: Negative for blurred vision, double vision, photophobia, pain and discharge. Respiratory: Negative for cough and shortness of breath. Cardiovascular: Negative for chest pain, palpitations, orthopnea, claudication and leg swelling. Gastrointestinal: Negative for abdominal pain, constipation, diarrhea, heartburn, nausea and vomiting. Genitourinary: Negative for dysuria, frequency and urgency. Musculoskeletal: Negative for back pain, joint pain, myalgias and neck pain. Skin: Negative for rash. Neurological: Negative. Negative for headaches. Endo/Heme/Allergies: Does not bruise/bleed easily. Psychiatric/Behavioral: Negative for memory loss and suicidal ideas. The patient has insomnia. Visit Vitals /68 (BP 1 Location: Left arm, BP Patient Position: Sitting) Pulse (!) 58 Temp 98.1 °F (36.7 °C) (Oral) Resp 16 Ht 5' 9\" (1.753 m) Wt 173 lb (78.5 kg) SpO2 96% BMI 25.55 kg/m² Physical Exam  
Constitutional: He is oriented to person, place, and time. He appears well-developed and well-nourished. HENT:  
Head: Normocephalic and atraumatic. Eyes: EOM are normal. Pupils are equal, round, and reactive to light. Cardiovascular: Normal rate and regular rhythm. No murmur heard. Pulmonary/Chest: Effort normal and breath sounds normal. No respiratory distress. Abdominal: Soft. Bowel sounds are normal. He exhibits no distension. There is no tenderness. Musculoskeletal: Normal range of motion. He exhibits no edema. Healed lumbar incision. Normal lower extremity deep tendon reflexes and strength testing Neurological: He is alert and oriented to person, place, and time. Skin: Skin is warm and dry. He is not diaphoretic. Psychiatric: He has a normal mood and affect. His behavior is normal.  
 
 
 
Current Outpatient Medications Medication Sig  ALPRAZolam (XANAX) 0.5 mg tablet Take 1 Tab by mouth nightly as needed. Max Daily Amount: 0.5 mg.  
 niacin (NIASPAN) 1,000 mg Tb24 tab Take 2 Tabs by mouth nightly.  zolpidem CR (AMBIEN CR) 12.5 mg tablet Take 1 Tab by mouth nightly as needed for Sleep. Max Daily Amount: 12.5 mg.  
 metoprolol succinate (TOPROL-XL) 25 mg XL tablet Take 1 Tab by mouth nightly.  CRESTOR 10 mg tablet Take 1 Tab by mouth nightly. BRANDED IS REQUIRED  folic acid (FOLVITE) 1 mg tablet Take 1 Tab by mouth nightly.  DIPHENHYDRAMINE HCL (UNISOM SLEEPGELS PO) Take  by mouth.  MELATONIN PO Take  by mouth.  Aspirin, Buffered 81 mg tab Take 162 mg by mouth nightly.  omega-3 fatty acids-vitamin e (FISH OIL) 1,000 mg cap Take 1 Cap by mouth two (2) times a day.  multivitamins-minerals-lutein (CENTRUM SILVER) tab Take 1 Tab by mouth daily.  ascorbic acid (VITAMIN C) 500 mg tablet Take 500 mg by mouth daily.  traMADol (ULTRAM) 50 mg tablet Take 2 Tabs by mouth every eight (8) hours as needed for Pain. Max Daily Amount: 300 mg. Do not take within 6 hours of alprazolam  
 predniSONE (STERAPRED DS) 10 mg dose pack See administration instruction per 10mg dose pack No current facility-administered medications for this visit. Past Medical History:  
Diagnosis Date  CAD (coronary artery disease) 1993 AMI  Cancer ([facility] [address] 2001 colon - partial colectomy  Colon polyp  Heart disease  Hypercholesteremia  Hypertension  MI, old  Tachycardia Past Surgical History:  
Procedure Laterality Date  CARDIAC SURG PROCEDURE UNLIST    
 cabg x 3, multiple stents. -   
 HX COLECTOMY partial  
 HX HEENT    
 tonsillectomy  HX HERNIA REPAIR    
 bilateral inguinal  
 HX TURP    
 HX UROLOGICAL    
 TURP Social History Tobacco Use  Smoking status: Former Smoker Packs/day: 1.00 Years: 50.00 Pack years: 50.00 Last attempt to quit: 2012 Years since quittin.7  Smokeless tobacco: Never Used Substance Use Topics  Alcohol use: Yes Alcohol/week: 3.0 oz Types: 3 Cans of beer, 2 Standard drinks or equivalent per week Family History Problem Relation Age of Onset  Heart Disease Father  Diabetes Mother  Heart Disease Paternal Grandfather  Cancer Maternal Grandmother   
     pancreatic Allergies Allergen Reactions  Rosuvastatin Myalgia Generic rosuvastatin causes myalgias Assessment/Plan Diagnoses and all orders for this visit: 
 
1. Hypertension, unspecified type -controlled no changes needed. -     CBC WITH AUTOMATED DIFF 
 
2. Insomnia, unspecified type -patient just seems to require less sleep. I discussed my concerns over long time use of Ambien.  verified he does not seem to uses regularly. -     zolpidem CR (AMBIEN CR) 12.5 mg tablet; Take 1 Tab by mouth nightly as needed for Sleep. Max Daily Amount: 12.5 mg. 
 
3. Anxiety -PRN Xanax. Does not require many of these. I voiced my concern over long-term cognitive issues and use of benzos -     ALPRAZolam (XANAX) 0.5 mg tablet; Take 1 Tab by mouth nightly as needed.  Max Daily Amount: 0.5 mg. 
 
4. Atherosclerosis of native coronary artery of native heart without angina pectoris -previously seeing a cardiologist has not seen any in some time.  He would like referral to cardiology. Denies any current anginal symptoms 
-     folic acid (FOLVITE) 1 mg tablet; Take 1 Tab by mouth nightly. -     metoprolol succinate (TOPROL-XL) 25 mg XL tablet; Take 1 Tab by mouth nightly. -     niacin (NIASPAN) 1,000 mg Tb24 tab; Take 2 Tabs by mouth nightly. 
-     REFERRAL TO CARDIOLOGY 5. Other hyperlipidemia -check lipids 
-     CRESTOR 10 mg tablet; Take 1 Tab by mouth nightly. BRANDED IS REQUIRED 
-     niacin (NIASPAN) 1,000 mg Tb24 tab; Take 2 Tabs by mouth nightly. -     LIPID PANEL 
-     METABOLIC PANEL, COMPREHENSIVE 6. Chronic bilateral low back pain without sciatica -discussed that this is likely all structural.  And the fact that this is worse in the morning we will check inflammatory markers. Suggest doing physical therapy -     SED RATE (ESR) -     C REACTIVE PROTEIN, QT 
 
7. History of colon cancer -patient due for colorectal cancer screening 
-     REFERRAL TO GASTROENTEROLOGY I discussed that he needs to be seen as a new patient and establish with a new provider. Follow-up Disposition: Not on File Ericka Rooney MD 
2/25/2019

## 2019-03-05 ENCOUNTER — HOSPITAL ENCOUNTER (OUTPATIENT)
Dept: PHYSICAL THERAPY | Age: 74
Discharge: HOME | End: 2019-03-05
Payer: MEDICARE

## 2019-03-05 ENCOUNTER — HOSPITAL ENCOUNTER (OUTPATIENT)
Dept: LAB | Age: 74
Discharge: HOME | End: 2019-03-05
Payer: MEDICARE

## 2019-03-05 PROCEDURE — 85025 COMPLETE CBC W/AUTO DIFF WBC: CPT

## 2019-03-05 PROCEDURE — 97162 PT EVAL MOD COMPLEX 30 MIN: CPT | Performed by: PHYSICAL THERAPIST

## 2019-03-05 PROCEDURE — 85651 RBC SED RATE NONAUTOMATED: CPT

## 2019-03-05 PROCEDURE — 86140 C-REACTIVE PROTEIN: CPT

## 2019-03-05 PROCEDURE — 36415 COLL VENOUS BLD VENIPUNCTURE: CPT

## 2019-03-05 PROCEDURE — 97016 VASOPNEUMATIC DEVICE THERAPY: CPT | Performed by: PHYSICAL THERAPIST

## 2019-03-05 PROCEDURE — 97110 THERAPEUTIC EXERCISES: CPT | Performed by: PHYSICAL THERAPIST

## 2019-03-05 PROCEDURE — 80061 LIPID PANEL: CPT

## 2019-03-05 PROCEDURE — 80053 COMPREHEN METABOLIC PANEL: CPT

## 2019-03-05 NOTE — PROGRESS NOTES
PT INITIAL EVALUATION NOTE - [facility] 2-15    Patient Name: Kami Higgins  Date:3/5/2019  : 1945  [x]  Patient  Verified  Payor: [place] MEDICARE / Plan: [place] MEDICARE PART A & B / Product Type: Medicare /    In time:10:10 AM  Out time:11:00 AM  Total Treatment Time (min): 50  Total Timed Codes (min): 25  1:1 Treatment Time ( [address] only): 50   Visit #: 1     Treatment Area: Back pain [M54.9]    SUBJECTIVE  Pain Level (0-10 scale): 4  Any medication changes, allergies to medications, adverse drug reactions, diagnosis change, or new procedure performed?: [] No    [x] Yes (see summary sheet for update)  Subjective:    Low back pain, s/p lumbar fusion  PLOF: No limitations with walking, sitting, sleeping  Mechanism of Injury: Patient initially had an onset of low back pain in early  and was treated at this clinic for several weeks. 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 He had surgery in 2018 by Dr. Caesar Koyanagi to remove the tumor and fuse the segment. Post-op rehab went well and he was referred to this clinic by his PCP for core strengthening. Previous Treatment/Compliance: No other formal treatment, no f/u imaging since surgery. He has no future scheduled appt with his neurosurgeon.   PMHx/Surgical Hx: History of colon cancer  Work Hx: Retired  Living Situation: lives in a two story home alone  Pt Goals: to reduce pain and improve mobility  Barriers: chronicity  Motivation: motivated  Substance use: none  FABQ Score: n/a  Cognition: A & O x 4        OBJECTIVE/EXAMINATION   Gait and Functional Mobility:   Gait: Antalgic gait pattern, slow speed   Palpation: TTP along the lower paraspinals  Joint Mobility: NT    Lumbar AROM: NT due to uncertainty of lumbar precautions, but WNL for ADL's     MMT:      LOWER QUARTER   MUSCLE STRENGTH  KEY       R  L  0 - No Contraction  L1, L2 Psoas  3+/5  4/5     1 - Trace   L3 Quads  4/5  4/5    2 - Poor   L4 Tib Ant  4/5  3+/5      3 - Fair    L5 EHL  4/5  4/5    4 - Good   S1 FHL  4/5  4/5    5 - Normal   S2 Hams  4/5  3+/5            Neurological: Sensation: Numbness reported along the L5 dermatome from the mid thigh to the foot  Special Tests:        Slump: Negative   SLR: Negative           Modality rationale: decrease inflammation and decrease pain to improve the patients ability to walk without numbness   Type Additional Details   [] Estim: []Att   []Unatt        []TENS instruct                  []IFC  []Premod   []NMES                     []Other:  []w/US   []w/ice   []w/heat  Position:  Location:   []  Traction: [] Cervical       []Lumbar                       [] Prone          []Supine                       []Intermittent   []Continuous Lbs:  [] before manual  [] after manual  []w/heat   []  Ultrasound: []Continuous   [] Pulsed at:                           []1MHz   []3MHz Location:  W/cm2:   [] Paraffin         Location:   []w/heat   []  Ice     []  Heat  []  Ice massage Position:  Location:   []  Laser  []  Other: Position:  Location:     [x]  Vasopneumatic Device Pressure:       [x] lo [] med [] hi   Temperature: 34     [x] Skin assessment post-treatment:  [x]intact []redness- no adverse reaction    []redness - adverse reaction:     25 min Therapeutic Exercise:  [x] See flow sheet :   Rationale: increase ROM, increase strength and improve coordination to improve the patients ability to walk without numbness          With   [] TE   [] TA   [] neuro   [] other: Patient Education: [x] Review HEP    [] Progressed/Changed HEP based on:   [] positioning   [] body mechanics   [] transfers   [] heat/ice application    [] other:      Other Objective/Functional Measures:     Pain Level (0-10 scale) post treatment: 2    ASSESSMENT/Changes in Function:     [x]  See Plan of Delroy Afmalena, PT , DPT, OCS, Cert.  DN   3/5/2019

## 2019-03-05 NOTE — PROGRESS NOTES
[address] [facility] [address]  Phone: [phone]  Fax: [phone]    Plan of Care/Statement of Necessity for Physical Therapy Services  2-15    Patient name: Suly Dong  : 1945  Provider#: [id number]  Referral source: Sarah Curry MD      Medical/Treatment Diagnosis: Back pain [M54.9]     Prior Hospitalization: see medical history     Comorbidities: Previous history of colon cancer  Prior Level of Function: see initial eval  Medications: Verified on Patient Summary List  Start of Care: 3/5/19      Onset Date: 2018  The Plan of Care and following information is based on the information from the initial evaluation. 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 He also has had a recent onset of L LE numbness similar to his pre-op status. He tolerated a mild core stability program well and the POC will focus on a gradual progression of stability through this region to allow for greater tolerance to walking. Evaluation Complexity History MEDIUM  Complexity : 1-2 comorbidities / personal factors will impact the outcome/ POC ; Examination MEDIUM Complexity : 3 Standardized tests and measures addressing body structure, function, activity limitation and / or participation in recreation  ;Presentation MEDIUM Complexity : Evolving with changing characteristics  ; Clinical Decision Making MEDIUM Complexity : FOTO score of 26-74  Overall Complexity Rating: MEDIUM    Problem List: pain affecting function, decrease ROM, decrease strength, impaired gait/ balance, decrease ADL/ functional abilitiies, decrease activity tolerance, decrease flexibility/ joint mobility and decrease transfer abilities   Treatment Plan may include any combination of the following: Therapeutic exercise, Therapeutic activities, Neuromuscular re-education, Physical agent/modality, Gait/balance training, Manual therapy, Patient education, Self Care training, Functional mobility training, Home safety training and Stair training  Patient / Family readiness to learn indicated by: asking questions, trying to perform skills and interest  Persons(s) to be included in education: patient (P)  Barriers to Learning/Limitations: None  Patient Goal (s): I want to be able to walk without LE numbness.   Patient Self Reported Health Status: excellent  Rehabilitation Potential: excellent    Short Term Goals: To be accomplished in 8 treatments:  1. Patient will be able to ambulate two blocks with no pain or limitation. 2. Patient will be able to stand for 10 minutes with no pain or limitation. 3. Patient will be able to sit for 30 minutes with no pain or limitation. Long Term Goals: To be accomplished in 16 treatments:  1. Patient will be able to ambulate 1/4 mile with no pain or limitation. 2. Patient will be able to stand for 30 minutes with no pain or limitation. 3. Patient will be able to sit for 60 minutes with no pain or limitation. Frequency / Duration: Patient to be seen 2 times per week for 8 weeks. Patient/ Caregiver education and instruction: self care, activity modification and exercises    [x]  Plan of care has been reviewed with PTA        Certification Period: 3/5/19 - 8/5/19  Manolo Obrien, PT , DPT, OCS, Cert. DN   3/5/2019     ________________________________________________________________________    I certify that the above Therapy Services are being furnished while the patient is under my care. I agree with the treatment plan and certify that this therapy is necessary.     [de-identified] Signature:____________________  Date:____________Time: _________

## 2019-03-06 LAB
ALBUMIN SERPL-MCNC: 4.5 G/DL (ref 3.5–4.8)
ALBUMIN/GLOB SERPL: 1.7 {RATIO} (ref 1.2–2.2)
ALP SERPL-CCNC: 91 IU/L (ref 39–117)
ALT SERPL-CCNC: 22 IU/L (ref 0–44)
AST SERPL-CCNC: 21 IU/L (ref 0–40)
BASOPHILS # BLD AUTO: 0.1 X10E3/UL (ref 0–0.2)
BASOPHILS NFR BLD AUTO: 1 %
BILIRUB SERPL-MCNC: 0.7 MG/DL (ref 0–1.2)
BUN SERPL-MCNC: 23 MG/DL (ref 8–27)
BUN/CREAT SERPL: 20 (ref 10–24)
CALCIUM SERPL-MCNC: 9.7 MG/DL (ref 8.6–10.2)
CHLORIDE SERPL-SCNC: 101 MMOL/L (ref 96–106)
CHOLEST SERPL-MCNC: 120 MG/DL (ref 100–199)
CO2 SERPL-SCNC: 25 MMOL/L (ref 20–29)
CREAT SERPL-MCNC: 1.13 MG/DL (ref 0.76–1.27)
CRP SERPL-MCNC: <0.3 MG/L (ref 0–4.9)
EOSINOPHIL # BLD AUTO: 0.2 X10E3/UL (ref 0–0.4)
EOSINOPHIL NFR BLD AUTO: 2 %
ERYTHROCYTE [DISTWIDTH] IN BLOOD BY AUTOMATED COUNT: 13.7 % (ref 12.3–15.4)
ERYTHROCYTE [SEDIMENTATION RATE] IN BLOOD BY WESTERGREN METHOD: 2 MM/HR (ref 0–30)
GLOBULIN SER CALC-MCNC: 2.6 G/DL (ref 1.5–4.5)
GLUCOSE SERPL-MCNC: 140 MG/DL (ref 65–99)
HCT VFR BLD AUTO: 45.3 % (ref 37.5–51)
HDLC SERPL-MCNC: 42 MG/DL
HGB BLD-MCNC: 15.7 G/DL (ref 13–17.7)
IMM GRANULOCYTES # BLD AUTO: 0 X10E3/UL (ref 0–0.1)
IMM GRANULOCYTES NFR BLD AUTO: 0 %
INTERPRETATION, 910389: NORMAL
LDLC SERPL CALC-MCNC: 60 MG/DL (ref 0–99)
LYMPHOCYTES # BLD AUTO: 1.7 X10E3/UL (ref 0.7–3.1)
LYMPHOCYTES NFR BLD AUTO: 25 %
MCH RBC QN AUTO: 32.8 PG (ref 26.6–33)
MCHC RBC AUTO-ENTMCNC: 34.7 G/DL (ref 31.5–35.7)
MCV RBC AUTO: 95 FL (ref 79–97)
MONOCYTES # BLD AUTO: 0.8 X10E3/UL (ref 0.1–0.9)
MONOCYTES NFR BLD AUTO: 12 %
NEUTROPHILS # BLD AUTO: 4 X10E3/UL (ref 1.4–7)
NEUTROPHILS NFR BLD AUTO: 60 %
PLATELET # BLD AUTO: 134 X10E3/UL (ref 150–379)
POTASSIUM SERPL-SCNC: 4.4 MMOL/L (ref 3.5–5.2)
PROT SERPL-MCNC: 7.1 G/DL (ref 6–8.5)
RBC # BLD AUTO: 4.78 X10E6/UL (ref 4.14–5.8)
SODIUM SERPL-SCNC: 140 MMOL/L (ref 134–144)
TRIGL SERPL-MCNC: 89 MG/DL (ref 0–149)
VLDLC SERPL CALC-MCNC: 18 MG/DL (ref 5–40)
WBC # BLD AUTO: 6.6 X10E3/UL (ref 3.4–10.8)

## 2019-03-11 ENCOUNTER — HOSPITAL ENCOUNTER (OUTPATIENT)
Dept: PHYSICAL THERAPY | Age: 74
Discharge: HOME | End: 2019-03-11
Payer: MEDICARE

## 2019-03-11 PROCEDURE — 97110 THERAPEUTIC EXERCISES: CPT

## 2019-03-11 PROCEDURE — 97016 VASOPNEUMATIC DEVICE THERAPY: CPT

## 2019-03-11 NOTE — PROGRESS NOTES
PT DAILY TREATMENT NOTE - [facility] 2-15    Patient Name: Ayse Ureña  Date:3/11/2019  : 1945  [x]  Patient  Verified  Payor: [employer] / Plan: [place] MEDICARE PART A & B / Product Type: Medicare /    In time:10:30a  Out time:11:25a  Total Treatment Time (min): 55  Total Timed Codes (min): 40  1:1 Treatment Time ( only): 30   Visit #:  2    Treatment Area: Back pain [M54.9]    SUBJECTIVE  Pain Level (0-10 scale): 1  Any medication changes, allergies to medications, adverse drug reactions, diagnosis change, or new procedure performed?: [x] No    [] Yes (see summary sheet for update)  Subjective functional status/changes:   [] No changes reported  Patient reports he has some tingling after the exercises, but the more he did them the easier it became and is no longer getting the tingling feeling.     OBJECTIVE    Modality rationale: decrease inflammation and decrease pain to improve the patients ability to sit, stand, lift, carry reach and complete ADL's   Type Additional Details   [] Estim: []Att   []Unatt    []TENS instruct                  []IFC  []Premod   []NMES                     []Other:  []w/US   []w/ice   []w/heat  Position:  Location:   []  Traction: [] Cervical       []Lumbar                       [] Prone          []Supine                       []Intermittent   []Continuous Lbs:  [] before manual  [] after manual  []w/heat   []  Ultrasound: []Continuous   [] Pulsed                       at: []1MHz   []3MHz Location:  W/cm2:   [] Paraffin         Location:   []w/heat   []  Ice     []  Heat  []  Ice massage Position:  Location:   []  Laser  []  Other: Position:  Location:     [x]  Vasopneumatic Device Pressure:       [x] lo [] med [] hi   Temperature: 34     [x] Skin assessment post-treatment:  [x]intact []redness- no adverse reaction    []redness - adverse reaction:     45 min Therapeutic Exercise:  [x] See flow sheet :   Rationale: increase ROM, increase strength, improve coordination, improve balance and increase proprioception to improve the patients ability to  sit, stand, lift, carry reach and complete ADL's    With   [] TE   [] TA   [] neuro   [] other: Patient Education: [x] Review HEP    [] Progressed/Changed HEP based on:   [] positioning   [] body mechanics   [] transfers   [] heat/ice application    [] other:      Other Objective/Functional Measures: no pain with advanced interventions     Pain Level (0-10 scale) post treatment: 0    ASSESSMENT/Changes in Function:     Patient will continue to benefit from skilled PT services to modify and progress therapeutic interventions, address functional mobility deficits, address ROM deficits, address strength deficits, analyze and address soft tissue restrictions, analyze and cue movement patterns, analyze and modify body mechanics/ergonomics and assess and modify postural abnormalities to attain remaining goals. []  See Plan of Care  []  See progress note/recertification  []  See Discharge Summary         Progress towards goals / Updated goals:  Patient demonstrates good tolerance for advanced interventions with mod verbal cues required for proper reproduction. Patient will do well with continued progression and is making good progress towards goals.      PLAN  [x]  Upgrade activities as tolerated     [x]  Continue plan of care  [x]  Update interventions per flow sheet       []  Discharge due to:_  []  Other:_      Marsha Cotto 3/11/2019

## 2019-03-15 ENCOUNTER — HOSPITAL ENCOUNTER (OUTPATIENT)
Dept: PHYSICAL THERAPY | Age: 74
Discharge: HOME | End: 2019-03-15
Payer: MEDICARE

## 2019-03-15 PROCEDURE — 97016 VASOPNEUMATIC DEVICE THERAPY: CPT | Performed by: PHYSICAL THERAPIST

## 2019-03-15 PROCEDURE — 97110 THERAPEUTIC EXERCISES: CPT | Performed by: PHYSICAL THERAPIST

## 2019-03-15 NOTE — PROGRESS NOTES
PT DAILY TREATMENT NOTE - [facility] 2-15    Patient Name: Kiersten Ibanez  Date:3/15/2019  : 1945  [x]  Patient  Verified  Payor: [place] MEDICARE / Plan: [place] MEDICARE PART A & B / Product Type: Medicare /    In time:9:15 AM Out time:10:10 AM  Total Treatment Time (min): 55  Total Timed Codes (min): 40  1:1 Treatment Time ( only): 40   Visit #:  3    Treatment Area: Back pain [M54.9]    SUBJECTIVE  Pain Level (0-10 scale): 1  Any medication changes, allergies to medications, adverse drug reactions, diagnosis change, or new procedure performed?: [x] No    [] Yes (see summary sheet for update)  Subjective functional status/changes:   [] No changes reported  Patient reports continued improvement with his HEP and is happy with his progress.     OBJECTIVE    Modality rationale: decrease inflammation and decrease pain to improve the patients ability to sit, stand, lift, carry reach and complete ADL's   Type Additional Details   [] Estim: []Att   []Unatt    []TENS instruct                  []IFC  []Premod   []NMES                     []Other:  []w/US   []w/ice   []w/heat  Position:  Location:   []  Traction: [] Cervical       []Lumbar                       [] Prone          []Supine                       []Intermittent   []Continuous Lbs:  [] before manual  [] after manual  []w/heat   []  Ultrasound: []Continuous   [] Pulsed                       at: []1MHz   []3MHz Location:  W/cm2:   [] Paraffin         Location:   []w/heat   []  Ice     []  Heat  []  Ice massage Position:  Location:   []  Laser  []  Other: Position:  Location:     [x]  Vasopneumatic Device Pressure:       [x] lo [] med [] hi   Temperature: 34     [x] Skin assessment post-treatment:  [x]intact []redness- no adverse reaction    []redness - adverse reaction:     40 min Therapeutic Exercise:  [x] See flow sheet :   Rationale: increase ROM, increase strength, improve coordination, improve balance and increase proprioception to improve the patients ability to  sit, stand, lift, carry reach and complete ADL's    With   [] TE   [] TA   [] neuro   [] other: Patient Education: [x] Review HEP    [] Progressed/Changed HEP based on:   [] positioning   [] body mechanics   [] transfers   [] heat/ice application    [] other:      Other Objective/Functional Measures: no pain with advanced interventions     Pain Level (0-10 scale) post treatment: 0    ASSESSMENT/Changes in Function:     Patient will continue to benefit from skilled PT services to modify and progress therapeutic interventions, address functional mobility deficits, address ROM deficits, address strength deficits, analyze and address soft tissue restrictions, analyze and cue movement patterns, analyze and modify body mechanics/ergonomics and assess and modify postural abnormalities to attain remaining goals. []  See Plan of Care  []  See progress note/recertification  [x]  See Discharge Summary         PLAN  [x]  Upgrade activities as tolerated     [x]  Continue plan of care  [x]  Update interventions per flow sheet       []  Discharge due to:_  []  Other:_      Shyann Holcomb, PT , DPT, OCS, Cert.  DN   3/15/2019

## 2019-04-11 ENCOUNTER — OFFICE VISIT (OUTPATIENT)
Dept: INTERNAL MEDICINE CLINIC | Age: 74
End: 2019-04-11

## 2019-04-11 VITALS
HEIGHT: 69 IN | DIASTOLIC BLOOD PRESSURE: 79 MMHG | SYSTOLIC BLOOD PRESSURE: 142 MMHG | WEIGHT: 174 LBS | TEMPERATURE: 97.7 F | BODY MASS INDEX: 25.77 KG/M2 | OXYGEN SATURATION: 100 % | RESPIRATION RATE: 20 BRPM | HEART RATE: 53 BPM

## 2019-04-11 DIAGNOSIS — I25.2 HISTORY OF MI (MYOCARDIAL INFARCTION): ICD-10-CM

## 2019-04-11 DIAGNOSIS — Z12.11 ENCOUNTER FOR SCREENING COLONOSCOPY: Primary | ICD-10-CM

## 2019-04-11 DIAGNOSIS — Z85.038 HISTORY OF COLON CANCER: ICD-10-CM

## 2019-04-11 RX ORDER — SERTRALINE HYDROCHLORIDE 25 MG/1
25 TABLET, FILM COATED ORAL DAILY
Qty: 30 TAB | Refills: 1 | Status: SHIPPED | OUTPATIENT
Start: 2019-04-11 | End: 2019-04-11 | Stop reason: CLARIF

## 2019-04-11 NOTE — PROGRESS NOTES
Patient needs Brand name for cholesterol medication. Patient needs refills, patient saw IM 2/19 for medication evaluation and was 30 days, will need 90 refills.

## 2019-04-11 NOTE — PROGRESS NOTES
HISTORY OF PRESENT ILLNESS  Elenita Roberto is a 75 y.o. male. HPI  Patient presents to the office to get established. He states he was a patient of Dr. Alison Geller. He will be seeing Dr. Kelle Pool for his new PCP. Mr. Shepard Boxer. He states he had back problems and had surgery . He is feeling better since the surgery but admits he still has some stiffness after sitting for a long period of time. Mr. Shepard Boxer is active and likes to play pickle ball on Monday and Thursday. (he liked to play tennis in the past but states the court is to big to cover now) He states he might not be eating the healthiest. He does eat red meat twice a week and really does not like vegetables or chicken. Mr. Shepard Boxer has a history of colon cancer and by pass heart surgery. He currently does not have a heart doctor or gastro doctor. Allergies   Allergen Reactions    Rosuvastatin Myalgia     Generic rosuvastatin causes myalgias     Past Medical History:   Diagnosis Date    CAD (coronary artery disease)     [facility]     Cancer ([facility] [address]     colon - partial colectomy    Colon polyp     Heart disease     Hypercholesteremia     Hypertension     MI, old     Tachycardia      Past Surgical History:   Procedure Laterality Date    CARDIAC SURG PROCEDURE UNLIST      cabg x 3, multiple stents. -     HX COLECTOMY      partial    HX HEENT      tonsillectomy    HX HERNIA REPAIR      bilateral inguinal    HX TURP      HX UROLOGICAL      TURP     Social History     Socioeconomic History    Marital status:      Spouse name: Not on file    Number of children: Not on file    Years of education: Not on file    Highest education level: Not on file   Tobacco Use    Smoking status: Former Smoker     Packs/day: 1.00     Years: 50.00     Pack years: 50.00     Last attempt to quit: 2012     Years since quittin.9    Smokeless tobacco: Never Used   Substance and Sexual Activity    Alcohol use:  Yes     Alcohol/week: 3.0 oz     Types: 3 Cans of beer, 2 Standard drinks or equivalent per week    Drug use: No     Family History   Problem Relation Age of Onset    Heart Disease Father     Diabetes Mother     Heart Disease Paternal Grandfather     Cancer Maternal Grandmother         pancreatic       Review of Systems   Constitutional: Negative for malaise/fatigue. Cardiovascular: Negative for chest pain, palpitations and leg swelling. Gastrointestinal: Positive for constipation. Musculoskeletal: Positive for back pain. Psychiatric/Behavioral: The patient has insomnia. Blood pressure 142/79, pulse (!) 53, temperature 97.7 °F (36.5 °C), temperature source Oral, resp. rate 20, height 5' 9\" (1.753 m), weight 174 lb (78.9 kg), SpO2 100 %. Physical Exam   Constitutional: He appears well-developed and well-nourished. No distress. Cardiovascular: Regular rhythm. bradycardia   Pulmonary/Chest: Effort normal and breath sounds normal.   Psychiatric: He has a normal mood and affect. His behavior is normal. Judgment and thought content normal.       ASSESSMENT and PLAN  Diagnoses and all orders for this visit:    1. Encounter for screening colonoscopy  -     REFERRAL TO GASTROENTEROLOGY    2. History of colon cancer  -     REFERRAL TO GASTROENTEROLOGY    3. History of MI (myocardial infarction)  -     REFERRAL TO CARDIOLOGY    I have given the patient a referral to gastro and to the cardiologist. I would like for him to contact each of these speciality doctors for appointments. I will bring him back in one month to see Dr. Johnathan Jaeger. He understands if anything is needed before this appointment to contact the office. All this was discussed with the patient and he understands and agrees.

## 2019-04-22 NOTE — PROGRESS NOTES
[address] [facility] [place] [address] [place] [address] Drive  Phone: [phone] Fax: [phone]      Discharge Summary 2-15    Patient name: Rubina Roque  : 1945  Provider#: [id number]  Referral source: Orlin Leblanc MD      Medical/Treatment Diagnosis: Low back pain [M54.5]     Prior Hospitalization: see medical history     Comorbidities: See Plan of Care  Prior Level of Function: See Plan of Care  Medications: Verified on Patient Summary List    Start of Care: 3/5/19      Onset Date:2018   Visits from Start of Care: 3     Missed Visits: 0  Reporting Period : 3/5/19 to 3/15/19    Assessment/Summary of care: Mr. Kaelyn Carbajal did well with PT with a focus on therapeutic exercises to progress his lumbopelvic stability. In 3 PT visits, he achieved all long term goals and is independent in his HEP moving foward. Short Term Goals: To be accomplished in 8 treatments:  1. Patient will be able to ambulate two blocks with no pain or limitation. Met.  2. Patient will be able to stand for 10 minutes with no pain or limitation. Met.  3. Patient will be able to sit for 30 minutes with no pain or limitation. Met. Long Term Goals: To be accomplished in 16 treatments:  1. Patient will be able to ambulate 1/4 mile with no pain or limitation. Met.  2. Patient will be able to stand for 30 minutes with no pain or limitation. Met.  3. Patient will be able to sit for 60 minutes with no pain or limitation. Met. RECOMMENDATIONS:  [x]Discontinue therapy: [x]Patient has reached or is progressing toward set goals     []Patient is non-compliant or has abdicated     []Due to lack of appreciable progress towards set goals     []Other  Stiven Ferguson, PT , DPT, OCS, Cert.  DN   2019

## 2019-04-24 ENCOUNTER — OFFICE VISIT (OUTPATIENT)
Dept: CARDIOLOGY CLINIC | Age: 74
End: 2019-04-24

## 2019-04-24 VITALS
DIASTOLIC BLOOD PRESSURE: 82 MMHG | HEART RATE: 58 BPM | OXYGEN SATURATION: 97 % | BODY MASS INDEX: 25.77 KG/M2 | RESPIRATION RATE: 16 BRPM | HEIGHT: 69 IN | SYSTOLIC BLOOD PRESSURE: 128 MMHG | WEIGHT: 174 LBS

## 2019-04-24 DIAGNOSIS — I25.10 ATHEROSCLEROSIS OF NATIVE CORONARY ARTERY OF NATIVE HEART, ANGINA PRESENCE UNSPECIFIED: Primary | ICD-10-CM

## 2019-04-24 DIAGNOSIS — I10 ESSENTIAL HYPERTENSION: ICD-10-CM

## 2019-04-24 DIAGNOSIS — E78.49 OTHER HYPERLIPIDEMIA: ICD-10-CM

## 2019-04-24 NOTE — PROGRESS NOTES
Reason for Consult: CAD, CABG. Referring: Oh Botello MD    HPI: Ramona Paris is a 75 y.o. male with past medical history significant for CAD, MI, multiple stents, bypass surgery in 1999 performed at [facility] in [place]. All the stents were performed prior to the bypass surgery. No stenting performed after the bypass surgery. No significant cardiac events after the bypass surgery. He used to see Dr. Jaymie Gibson of [place] cardiovascular specialist.  Most recently he has changed physicians and healthcare systems. He is here today to establish care in our office. Denies any symptoms of chest pain, shortness of breath, lightheadedness or dizziness. Long-standing history of hypertension and dyslipidemia. Mostly blood pressure is well controlled however on occasions there are spikes of blood pressure ranging up to 686 systolic. No recent history of stress test or echocardiogram.  Is fairly active and tennis plays 2-3 times per week and walks the dog at least 2 times per day. He is an ex-smoker smoked about 50 years. EKG in my office today demonstrated sinus bradycardia with heart rate of 57 bpm with short SC interval with normal axis with normal intervals with normal ST segment. Most recent lab results are available from March 2019 creatinine is 1.1, AST ALT were normal.  LDL 60Hemoglobin was 15.7, platelet count 191,141    Plan:    1. Known history of CAD, PCI, CABG: Appears to be stable. No old records available for review however we will try to get as much of old records to figure out the anatomy. Nevertheless he is on good medications including aspirin, Crestor, Toprol-XL, Niaspan. We will continue these medications. 2.  Hypertension: Blood pressure is fairly controlled with some erratic readings. Continue Toprol-XL. If in future he has significant issues with hypertension then we can certainly add ACE inhibitors.   Going up on Toprol-XL would not be advisable given his marginal heart rate of 57 bpm.    3.  Dyslipidemia: Most recent LDL was 60. Continue Crestor and Niaspan. Past Medical History:   Diagnosis Date    CAD (coronary artery disease)     [facility]     Cancer ([facility] [address]     colon - partial colectomy    Colon polyp     Heart disease     Hypercholesteremia     Hypertension     MI, old     Tachycardia             Past Surgical History:   Procedure Laterality Date    CARDIAC SURG PROCEDURE UNLIST      cabg x 3, multiple stents. -     HX COLECTOMY      partial    HX HEENT      tonsillectomy    HX HERNIA REPAIR      bilateral inguinal    HX TURP      HX UROLOGICAL      TURP             Family History   Problem Relation Age of Onset    Heart Disease Father     Diabetes Mother     Heart Disease Paternal Grandfather     Cancer Maternal Grandmother         pancreatic           Social History     Socioeconomic History    Marital status:      Spouse name: Not on file    Number of children: Not on file    Years of education: Not on file    Highest education level: Not on file   Occupational History    Not on file   Social Needs    Financial resource strain: Not on file    Food insecurity:     Worry: Not on file     Inability: Not on file    Transportation needs:     Medical: Not on file     Non-medical: Not on file   Tobacco Use    Smoking status: Former Smoker     Packs/day: 1.00     Years: 50.00     Pack years: 50.00     Last attempt to quit: 2012     Years since quittin.9    Smokeless tobacco: Never Used   Substance and Sexual Activity    Alcohol use:  Yes     Alcohol/week: 3.0 oz     Types: 3 Cans of beer, 2 Standard drinks or equivalent per week    Drug use: No    Sexual activity: Not on file   Lifestyle    Physical activity:     Days per week: Not on file     Minutes per session: Not on file    Stress: Not on file   Relationships    Social connections:     Talks on phone: Not on file     Gets together: Not on file Attends Restorationism service: Not on file     Active member of club or organization: Not on file     Attends meetings of clubs or organizations: Not on file     Relationship status: Not on file    Intimate partner violence:     Fear of current or ex partner: Not on file     Emotionally abused: Not on file     Physically abused: Not on file     Forced sexual activity: Not on file   Other Topics Concern    Not on file   Social History Narrative    Not on file         Allergies   Allergen Reactions    Rosuvastatin Myalgia     Generic rosuvastatin causes myalgias            Current Outpatient Medications   Medication Sig Dispense Refill    CRESTOR 10 mg tablet Take 1 Tab by mouth nightly. BRANDED IS REQUIRED 90 Tab 1    folic acid (FOLVITE) 1 mg tablet Take 1 Tab by mouth nightly. 90 Tab 1    metoprolol succinate (TOPROL-XL) 25 mg XL tablet Take 1 Tab by mouth nightly. 90 Tab 1    niacin (NIASPAN) 1,000 mg Tb24 tab Take 2 Tabs by mouth nightly. 180 Tab 1    zolpidem CR (AMBIEN CR) 12.5 mg tablet Take 1 Tab by mouth nightly as needed for Sleep. Max Daily Amount: 12.5 mg. 30 Tab 2    ALPRAZolam (XANAX) 0.5 mg tablet Take 1 Tab by mouth nightly as needed. Max Daily Amount: 0.5 mg. 30 Tab 1    DIPHENHYDRAMINE HCL (UNISOM SLEEPGELS PO) Take  by mouth. Indications: PRN      MELATONIN PO Take  by mouth. Indications: PRN      Aspirin, Buffered 81 mg tab Take 162 mg by mouth nightly.  omega-3 fatty acids-vitamin e (FISH OIL) 1,000 mg cap Take 1 Cap by mouth two (2) times a day.  multivitamins-minerals-lutein (CENTRUM SILVER) tab Take 1 Tab by mouth daily.  ascorbic acid (VITAMIN C) 500 mg tablet Take 500 mg by mouth daily. ROS:  12 point review of systems was performed.  All negative except for HPI     Physical Exam:  Visit Vitals  /82 (BP 1 Location: Right arm, BP Patient Position: Sitting)   Pulse (!) 58   Resp 16   Ht 5' 9\" (1.753 m)   Wt 174 lb (78.9 kg)   SpO2 97%   BMI 25.70 kg/m² Gen:  Well-developed, well-nourished, in no acute distress  HEENT:  Pink conjunctivae, PERRL, hearing intact to voice, moist mucous membranes  Neck:  Supple, without masses, thyroid non-tender  Resp:  No accessory muscle use, clear breath sounds without wheezes rales or rhonchi  Card:  No murmurs, normal S1, S2 without thrills, bruits or peripheral edema  Abd:  Soft, non-tender, non-distended, normoactive bowel sounds are present, no palpable organomegaly and no detectable hernias  Lymph:  No cervical or inguinal adenopathy  Musc:  No cyanosis or clubbing  Skin:  No rashes or ulcers, skin turgor is good  Neuro:  Cranial nerves are grossly intact, no focal motor weakness, follows commands appropriately  Psych:  Good insight, oriented to person, place and time, alert     Labs:     Lab Results   Component Value Date/Time    WBC 6.6 03/05/2019 09:17 AM    HGB 15.7 03/05/2019 09:17 AM    HCT 45.3 03/05/2019 09:17 AM    PLATELET 570 (L) 27/71/3165 09:17 AM    MCV 95 03/05/2019 09:17 AM     Lab Results   Component Value Date/Time    Glucose 140 (H) 03/05/2019 09:17 AM    LDL, calculated 60 03/05/2019 09:17 AM    Creatinine 1.13 03/05/2019 09:17 AM      Lab Results   Component Value Date/Time    Cholesterol, total 120 03/05/2019 09:17 AM    HDL Cholesterol 42 03/05/2019 09:17 AM    LDL, calculated 60 03/05/2019 09:17 AM    Triglyceride 89 03/05/2019 09:17 AM     Lab Results   Component Value Date/Time    ALT (SGPT) 22 03/05/2019 09:17 AM    AST (SGOT) 21 03/05/2019 09:17 AM    Alk.  phosphatase 91 03/05/2019 09:17 AM    Bilirubin, total 0.7 03/05/2019 09:17 AM    Albumin 4.5 03/05/2019 09:17 AM    Protein, total 7.1 03/05/2019 09:17 AM    PLATELET 504 (L) 11/35/2605 09:17 AM     No results found for: INR, PTMR, PTP, PT1, PT2   Lab Results   Component Value Date/Time    GFR est non-AA 64 03/05/2019 09:17 AM    GFR est AA 74 03/05/2019 09:17 AM    Creatinine 1.13 03/05/2019 09:17 AM    BUN 23 03/05/2019 09:17 AM Sodium 140 03/05/2019 09:17 AM    Potassium 4.4 03/05/2019 09:17 AM    Chloride 101 03/05/2019 09:17 AM    CO2 25 03/05/2019 09:17 AM    Magnesium 2.0 11/23/2013 03:40 AM     No results found for: PSA, Oakley Slimmer, PSAR3, [id number], [id number], PSALT  No results found for: TSH, TSH2, TSH3, TSHP, TSHELE, TSHEXT, TT3, T3U, T3UP, FRT3, FT3, FT4, FT4P, T4, T4P, FT4T, TT7, TSHEXT   Lab Results   Component Value Date/Time    Glucose 140 (H) 03/05/2019 09:17 AM      No results found for: CPK, RCK1, RCK2, RCK3, RCK4, CKMB, CKNDX, CKND1, TROPT, TROIQ, BNPP, BNP   No results found for: BNP, BNPP, BNPPPOC, XBNPT, BNPNT   Lab Results   Component Value Date/Time    Sodium 140 03/05/2019 09:17 AM    Potassium 4.4 03/05/2019 09:17 AM    Chloride 101 03/05/2019 09:17 AM    CO2 25 03/05/2019 09:17 AM    Anion gap 7 06/20/2015 11:45 AM    Glucose 140 (H) 03/05/2019 09:17 AM    BUN 23 03/05/2019 09:17 AM    Creatinine 1.13 03/05/2019 09:17 AM    BUN/Creatinine ratio 20 03/05/2019 09:17 AM    GFR est AA 74 03/05/2019 09:17 AM    GFR est non-AA 64 03/05/2019 09:17 AM    Calcium 9.7 03/05/2019 09:17 AM      Lab Results   Component Value Date/Time    Sodium 140 03/05/2019 09:17 AM    Potassium 4.4 03/05/2019 09:17 AM    Chloride 101 03/05/2019 09:17 AM    CO2 25 03/05/2019 09:17 AM    Anion gap 7 06/20/2015 11:45 AM    Glucose 140 (H) 03/05/2019 09:17 AM    BUN 23 03/05/2019 09:17 AM    Creatinine 1.13 03/05/2019 09:17 AM    BUN/Creatinine ratio 20 03/05/2019 09:17 AM    GFR est AA 74 03/05/2019 09:17 AM    GFR est non-AA 64 03/05/2019 09:17 AM    Calcium 9.7 03/05/2019 09:17 AM    Bilirubin, total 0.7 03/05/2019 09:17 AM    ALT (SGPT) 22 03/05/2019 09:17 AM    AST (SGOT) 21 03/05/2019 09:17 AM    Alk.  phosphatase 91 03/05/2019 09:17 AM    Protein, total 7.1 03/05/2019 09:17 AM    Albumin 4.5 03/05/2019 09:17 AM    Globulin 3.9 11/22/2013 07:50 AM    A-G Ratio 1.7 03/05/2019 09:17 AM      No results found for: HBA1C, QLN8HUTZ, HGBE8, UQP6RHZW, UWP4DHRK      No results for input(s): CPK, CKMB, TROIQ in the last 72 hours. No lab exists for component: CKQMB, CPKMB        Problem List:     Problem List  Date Reviewed: 4/24/2019          Codes Class Noted    Hypertension ICD-10-CM: I10  ICD-9-CM: 401.9  1/20/2019        History of colon cancer ICD-10-CM: Z85.038  ICD-9-CM: V10.05  5/18/2018        Coronary atherosclerosis of native coronary artery ICD-10-CM: I25.10  ICD-9-CM: 414.01  11/22/2013        Other hyperlipidemia ICD-10-CM: E78.49  ICD-9-CM: 272.4  11/22/2013        Tonsil, pharyngeal ICD-10-CM: J35.9  ICD-9-CM: 474.9  11/22/2013                Tulio Caal MD, [facility]

## 2019-04-24 NOTE — PROGRESS NOTES
Chief Complaint   Patient presents with    New Patient     Establishing care     Visit Vitals  /82 (BP 1 Location: Right arm, BP Patient Position: Sitting)   Pulse (!) 58   Resp 16   Ht 5' 9\" (1.753 m)   Wt 174 lb (78.9 kg)   SpO2 97%   BMI 25.70 kg/m²     Pt presents in office without cardiac complaint. He is here to establish cardiologist. In the past has had CABG with Kaylin Remy at [facility].     No recent hospital visits. No refills needed at this time.

## 2019-05-03 NOTE — PERIOP NOTES
[address]  Endoscopy Preprocedure Instructions      1. On the day of your surgery, please report to registration located on the 2nd floor of the  [facility]. yes    2. You must have a responsible adult to drive you to the hospital, stay at the hospital during your procedure and drive you home. If they leave your procedure will not be started (no exceptions). yes    3. Do not have anything to eat or drink (including water, gum, mints, coffee, and juice) after midnight. This does not apply to the medications you were instructed to take by your physician. yesIf you are currently taking Plavix, Coumadin, Aspirin, or other blood-thinning agents, contact your physician for special instructions. yes,aspirin    4. If you are having a procedure that requires bowel prep: We recommend that you have only clear liquids the day before your procedure and begin your bowel prep by 5:00 pm.  You may continue to drink clear liquids until midnight. If for any reason you are not able to complete your prep please notify your physician immediately. yes    5. Have a list of all current medications, including vitamins, herbal supplements and any other over the counter medications. yes    6. If you wear glasses, contacts, dentures and/or hearing aids, they may be removed prior to procedure, please bring a case to store them in. yes    7. You should understand that if you do not follow these instructions your procedure may be cancelled. If your physical condition changes (I.e. fever, cold or flu) please contact your doctor as soon as possible. 8. It is important that you be on time.   If for any reason you are unable to keep your appointment please call [phone] the day of or your physicians office prior to your scheduled procedure

## 2019-05-09 ENCOUNTER — HOSPITAL ENCOUNTER (OUTPATIENT)
Age: 74
Setting detail: OUTPATIENT SURGERY
Discharge: HOME | End: 2019-05-09
Attending: INTERNAL MEDICINE | Admitting: INTERNAL MEDICINE
Payer: MEDICARE

## 2019-05-09 ENCOUNTER — ANESTHESIA (OUTPATIENT)
Dept: ENDOSCOPY | Age: 74
End: 2019-05-09
Payer: MEDICARE

## 2019-05-09 ENCOUNTER — ANESTHESIA EVENT (OUTPATIENT)
Dept: ENDOSCOPY | Age: 74
End: 2019-05-09
Payer: MEDICARE

## 2019-05-09 VITALS
DIASTOLIC BLOOD PRESSURE: 54 MMHG | HEART RATE: 60 BPM | RESPIRATION RATE: 17 BRPM | TEMPERATURE: 97.6 F | OXYGEN SATURATION: 97 % | WEIGHT: 164 LBS | HEIGHT: 69 IN | BODY MASS INDEX: 24.29 KG/M2 | SYSTOLIC BLOOD PRESSURE: 114 MMHG

## 2019-05-09 PROCEDURE — 76060000031 HC ANESTHESIA FIRST 0.5 HR: Performed by: INTERNAL MEDICINE

## 2019-05-09 PROCEDURE — 88305 TISSUE EXAM BY PATHOLOGIST: CPT

## 2019-05-09 PROCEDURE — 77030013992 HC SNR POLYP ENDOSC BSC -B: Performed by: INTERNAL MEDICINE

## 2019-05-09 PROCEDURE — 74011250636 HC RX REV CODE- 250/636

## 2019-05-09 PROCEDURE — 76040000019: Performed by: INTERNAL MEDICINE

## 2019-05-09 RX ORDER — PROPOFOL 10 MG/ML
INJECTION, EMULSION INTRAVENOUS AS NEEDED
Status: DISCONTINUED | OUTPATIENT
Start: 2019-05-09 | End: 2019-05-09 | Stop reason: HOSPADM

## 2019-05-09 RX ORDER — NALOXONE HYDROCHLORIDE 0.4 MG/ML
0.4 INJECTION, SOLUTION INTRAMUSCULAR; INTRAVENOUS; SUBCUTANEOUS
Status: CANCELLED | OUTPATIENT
Start: 2019-05-09 | End: 2019-05-09

## 2019-05-09 RX ORDER — DEXTROMETHORPHAN/PSEUDOEPHED 2.5-7.5/.8
1.2 DROPS ORAL
Status: CANCELLED | OUTPATIENT
Start: 2019-05-09

## 2019-05-09 RX ORDER — MIDAZOLAM HYDROCHLORIDE 1 MG/ML
.25-5 INJECTION, SOLUTION INTRAMUSCULAR; INTRAVENOUS
Status: CANCELLED | OUTPATIENT
Start: 2019-05-09

## 2019-05-09 RX ORDER — SODIUM CHLORIDE 9 MG/ML
50 INJECTION, SOLUTION INTRAVENOUS CONTINUOUS
Status: CANCELLED | OUTPATIENT
Start: 2019-05-09 | End: 2019-05-09

## 2019-05-09 RX ORDER — PROPOFOL 10 MG/ML
INJECTION, EMULSION INTRAVENOUS
Status: DISCONTINUED | OUTPATIENT
Start: 2019-05-09 | End: 2019-05-09 | Stop reason: HOSPADM

## 2019-05-09 RX ORDER — FLUMAZENIL 0.1 MG/ML
0.2 INJECTION INTRAVENOUS
Status: CANCELLED | OUTPATIENT
Start: 2019-05-09 | End: 2019-05-09

## 2019-05-09 RX ORDER — ATROPINE SULFATE 0.1 MG/ML
0.4 INJECTION INTRAVENOUS
Status: CANCELLED | OUTPATIENT
Start: 2019-05-09 | End: 2019-05-10

## 2019-05-09 RX ORDER — EPINEPHRINE 0.1 MG/ML
1 INJECTION INTRACARDIAC; INTRAVENOUS
Status: CANCELLED | OUTPATIENT
Start: 2019-05-09 | End: 2019-05-10

## 2019-05-09 RX ADMIN — PROPOFOL 40 MG: 10 INJECTION, EMULSION INTRAVENOUS at 09:14

## 2019-05-09 RX ADMIN — PROPOFOL 20 MG: 10 INJECTION, EMULSION INTRAVENOUS at 09:22

## 2019-05-09 RX ADMIN — PROPOFOL 100 MCG/KG/MIN: 10 INJECTION, EMULSION INTRAVENOUS at 09:14

## 2019-05-09 RX ADMIN — PROPOFOL 30 MG: 10 INJECTION, EMULSION INTRAVENOUS at 09:21

## 2019-05-09 NOTE — ANESTHESIA PREPROCEDURE EVALUATION
Relevant Problems No relevant active problems Anesthetic History No history of anesthetic complications Review of Systems / Medical History Patient summary reviewed and pertinent labs reviewed Pulmonary Within defined limits Neuro/Psych Within defined limits Cardiovascular Hypertension Past MI, CAD, CABG and hyperlipidemia GI/Hepatic/Renal 
Within defined limits Endo/Other Within defined limits Other Findings Physical Exam 
 
Airway Mallampati: II 
TM Distance: 4 - 6 cm Neck ROM: normal range of motion Mouth opening: Normal 
 
 Cardiovascular Regular rate and rhythm,  S1 and S2 normal,  no murmur, click, rub, or gallop Rhythm: regular Rate: normal 
 
 
 
 Dental 
No notable dental hx Pulmonary Breath sounds clear to auscultation Abdominal 
GI exam deferred Other Findings Anesthetic Plan ASA: 3 Anesthesia type: MAC Induction: Intravenous Anesthetic plan and risks discussed with: Patient

## 2019-05-09 NOTE — ANESTHESIA POSTPROCEDURE EVALUATION
Procedure(s): 
COLONOSCOPY 
ENDOSCOPIC POLYPECTOMY. MAC Anesthesia Post Evaluation Multimodal analgesia: multimodal analgesia not used between 6 hours prior to anesthesia start to PACU discharge Patient location during evaluation: PACU Patient participation: complete - patient participated Level of consciousness: awake Pain management: adequate Airway patency: patent Anesthetic complications: no 
Cardiovascular status: acceptable, blood pressure returned to baseline and hemodynamically stable Respiratory status: acceptable Hydration status: acceptable Post anesthesia nausea and vomiting:  controlled Vitals Value Taken Time /54 5/9/2019  9:58 AM  
Temp 36.4 °C (97.6 °F) 5/9/2019  9:40 AM  
Pulse 60 5/9/2019 10:00 AM  
Resp 16 5/9/2019 10:00 AM  
SpO2 99 % 5/9/2019 10:00 AM  
Vitals shown include unvalidated device data.

## 2019-05-09 NOTE — PROCEDURES
Dewanda Bosworth, M.D.  [phone]            2019          Colonoscopy Operative Report  Ezekiel Barker  :  1945  [facility] Medical Record Number:  [id number]      Indications:    Personal history of colon cancer (screening only), Personal history of colon polyps (screening only)     :  Milena Rehman MD    Assistant -- None  Implants -- None    Referring Provider: Aamrjit Clarke MD    Sedation:  MAC anesthesia Propofol    Pre-Procedural Exam:      Airway: clear,  No airway problems anticipated  Heart: RRR, without gallops or rubs  Lungs: clear bilaterally without wheezes, crackles, or rhonchi  Abdomen: soft, nontender, nondistended, bowel sounds present  Mental Status: awake, alert and oriented to person, place and time     Procedure Details:  After informed consent was obtained with all risks and benefits of procedure explained and preoperative exam completed, the patient was taken to the endoscopy suite and placed in the left lateral decubitus position. Upon sequential sedation as per above, a digital rectal exam was performed. The Olympus videocolonoscope  was inserted in the rectum and carefully advanced to the surgical anastomosis . The quality of preparation was good. The colonoscope was slowly withdrawn with careful inspection and evaluation between folds. Retroflexion in the rectum was performed. Findings:   Transverse Colon: 3  Sessile polyp(s), the largest 8 mm in size;   Descending Colon:     - Diverticulosis  Sigmoid: 1  Sessile polyp(s), the largest 8 mm in size;      - Diverticulosis  Rectum: normal    Interventions:  4 complete polypectomy were performed using cold snare  and the polyps were  retrieved    Specimen Removed:  specimen #1, 8 mm in size, located in the transverse colon removed by cold snare and retrieved for pathology  #2, 8 mm in size, located in the sigmoid removed by cold snare and retrieved for pathology    Complications: None.     EBL:  None. Impression:  S/p right hemicolectomy noted                         4 polyps removed                         Severe diverticulosis of the left colon     Recommendations:  -Await pathology. -Repeat colonoscopy in 3 years.   -High fiber diet.    -Resume normal medication(s). Discharge Disposition:  Home in the company of a  when able to ambulate.     Chris Ibanez MD  5/9/2019  9:40 AM

## 2019-05-09 NOTE — H&P
Shiraz Escalona M.D.  [phone]            History and Physical       NAME:  Caro Good   :   1945   MRN:   [id number]       Referring Physician:  Carin Oneal MD      Consult Date: 2019 8:35 AM    Chief Complaint:  Colon cancer screening    History of Present Illness:  Patient is a 75 y. o. who is seen for colon cancer screening. Denies any ongoing GI complaints. PMH:  Past Medical History:   Diagnosis Date    CAD (coronary artery disease)     [facility]     Cancer ([facility] [address]     colon - partial colectomy    Colon polyp     Heart disease     Hypercholesteremia     Hypertension     MI, old     Tachycardia        PSH:  Past Surgical History:   Procedure Laterality Date    CARDIAC SURG PROCEDURE UNLIST      cabg x 3, multiple stents. -     HX COLECTOMY      partial    HX HEENT      tonsillectomy    HX HERNIA REPAIR      bilateral inguinal    HX TURP      HX UROLOGICAL      TURP       Allergies: Allergies   Allergen Reactions    Rosuvastatin Myalgia     Generic rosuvastatin causes myalgias       Home Medications:  Prior to Admission Medications   Prescriptions Last Dose Informant Patient Reported? Taking? ALPRAZolam (XANAX) 0.5 mg tablet 2019  No Yes   Sig: Take 1 Tab by mouth nightly as needed. Max Daily Amount: 0.5 mg. Aspirin, Buffered 81 mg tab 2019  Yes Yes   Sig: Take 162 mg by mouth nightly. CRESTOR 10 mg tablet 2019 at Unknown time  No Yes   Sig: Take 1 Tab by mouth nightly. BRANDED IS REQUIRED   DIPHENHYDRAMINE HCL (UNISOM SLEEPGELS PO) 2019  Yes Yes   Sig: Take  by mouth. Indications: PRN   MELATONIN PO 2019 at Unknown time  Yes Yes   Sig: Take  by mouth. Indications: PRN   ascorbic acid (VITAMIN C) 500 mg tablet 2019  Yes Yes   Sig: Take 500 mg by mouth daily. folic acid (FOLVITE) 1 mg tablet 2019 at Unknown time  No Yes   Sig: Take 1 Tab by mouth nightly.    metoprolol succinate (TOPROL-XL) 25 mg XL tablet 2019 at Unknown time  No Yes   Sig: Take 1 Tab by mouth nightly. multivitamins-minerals-lutein (CENTRUM SILVER) tab 2019  Yes Yes   Sig: Take 1 Tab by mouth daily. niacin (NIASPAN) 1,000 mg Tb24 tab 2019 at Unknown time  No Yes   Sig: Take 2 Tabs by mouth nightly. omega-3 fatty acids-vitamin e (FISH OIL) 1,000 mg cap 2019  Yes Yes   Sig: Take 1 Cap by mouth two (2) times a day. zolpidem CR (AMBIEN CR) 12.5 mg tablet 2019  No Yes   Sig: Take 1 Tab by mouth nightly as needed for Sleep. Max Daily Amount: 12.5 mg. Facility-Administered Medications: None       Hospital Medications:  No current facility-administered medications for this encounter. Social History:  Social History     Tobacco Use    Smoking status: Former Smoker     Packs/day: 1.00     Years: 50.00     Pack years: 50.00     Last attempt to quit: 2012     Years since quittin.9    Smokeless tobacco: Never Used   Substance Use Topics    Alcohol use:  Yes     Alcohol/week: 3.0 oz     Types: 3 Cans of beer, 2 Standard drinks or equivalent per week       Family History:  Family History   Problem Relation Age of Onset    Heart Disease Father     Diabetes Mother     Heart Disease Paternal Grandfather     Cancer Maternal Grandmother         pancreatic             Review of Systems:      Constitutional: negative fever, negative chills, negative weight loss  Eyes:   negative visual changes  ENT:   negative sore throat, tongue or lip swelling  Respiratory:  negative cough, negative dyspnea  Cards:  negative for chest pain, palpitations, lower extremity edema  GI:   See HPI  :  negative for frequency, dysuria  Integument:  negative for rash and pruritus  Heme:  negative for easy bruising and gum/nose bleeding  Musculoskel: negative for myalgias,  back pain and muscle weakness  Neuro: negative for headaches, dizziness, vertigo  Psych:  negative for feelings of anxiety, depression       Objective:     Patient Vitals for the past 8 hrs:   BP Temp Pulse Resp SpO2 Height Weight   05/09/19 0819 124/73 98.3 °F (36.8 °C) 69 19 97 % 5' 9\" (1.753 m) 74.4 kg (164 lb)     No intake/output data recorded. No intake/output data recorded. EXAM:     NEURO-a&o   HEENT-wnl   LUNGS-clear    COR-regular rate and rhythym     ABD-soft , no tenderness, no rebound, good bs     EXT-no edema     Data Review     No results for input(s): WBC, HGB, HCT, PLT, HGBEXT, HCTEXT, PLTEXT in the last 72 hours. No results for input(s): NA, K, CL, CO2, BUN, CREA, GLU, PHOS, CA in the last 72 hours. No results for input(s): SGOT, GPT, AP, TBIL, TP, ALB, GLOB, GGT, AML, LPSE in the last 72 hours. No lab exists for component: AMYP, HLPSE  No results for input(s): INR, PTP, APTT in the last 72 hours. No lab exists for component: INREXT    Patient Active Problem List   Diagnosis Code    Coronary atherosclerosis of native coronary artery I25.10    Other hyperlipidemia E78.49    Tonsil, pharyngeal J35.9    History of colon cancer Z85.038    Hypertension I10      Assessment:   · H/o polyps   Plan:   · Colonoscopy today.      Signed By: Chris Waterman MD     5/9/2019  8:35 AM

## 2019-05-09 NOTE — DISCHARGE INSTRUCTIONS
[facility]. Kenn Marshall M.D.  [phone]          COLON DISCHARGE INSTRUCTIONS       2019    Ezekiel Barker  :  1945  [facility] Medical Record Number:  [id number]      COLONOSCOPY FINDINGS:  Your colonoscopy showed: 4 polyps and diverticulosis. DISCOMFORT:  Redness at IV site- apply warm compress to area; if redness or soreness persist- contact your physician  There may be a slight amount of blood passed from the rectum  Gaseous discomfort- walking, belching will help relieve any discomfort  You may not operate a vehicle for 12 hours  You may not engage in an occupation involving machinery or appliances for rest of today  You may not drink alcoholic beverages for at least 12 hours  Avoid making any critical decisions for at least 24 hour  DIET:   High fiber diet. - however -  remember your colon is empty and a heavy meal will produce gas. Avoid these foods:  vegetables, fried / greasy foods, carbonated drinks for today     ACTIVITY:  You may resume your normal daily activities it is recommended that you spend the remainder of the day resting -  avoid any strenuous activity. CALL M.DAnne ANY SIGN OF:   Increasing pain, nausea, vomiting  Abdominal distension (swelling)  New increased bleeding (oral or rectal)  Fever (chills)  Pain in chest area  Bloody discharge from nose or mouth   Shortness of breath    Follow-up Instructions:   Call Dr. Michael Phelps if any questions or problems. Telephone # [phone]  Biopsy results will be available in  5 to 7 days  Should have a repeat colonoscopy in 3 years.

## 2019-05-09 NOTE — PROGRESS NOTES
Ezekiel Haley  1945  [id number]    Situation:  Verbal report received from:   Logan Rocha RN   Procedure: Procedure(s):  COLONOSCOPY  ENDOSCOPIC POLYPECTOMY    Background:    Preoperative diagnosis: POLYPS  Postoperative diagnosis: H/O right hemicolectomy, Diverticulosis of colon, polyps    :  Dr. Charles Roberts   Assistant(s): Endoscopy Technician-1: Jefe Shi  Endoscopy RN-1: Asha Pretty RN    Specimens:   ID Type Source Tests Collected by Time Destination   1 : Polyp Saline Colon, Abi Covarrubias MD 5/9/2019 0243 Pathology   2 : Fernie Neal MD 5/9/2019 1761 Pathology     H. Pylori  no    Assessment:  Intra-procedure medications       Anesthesia gave intra-procedure sedation and medications, see anesthesia flow sheet yes    Intravenous fluids: NS@ KVO     Vital signs stable   yes    Abdominal assessment: round and soft   yes    Recommendation:  Discharge patient per MD order  yes.   Return to floor  outpatient  Family or Friend   Spouse   Permission to share finding with family or friend yes

## 2019-05-20 ENCOUNTER — OFFICE VISIT (OUTPATIENT)
Dept: INTERNAL MEDICINE CLINIC | Age: 74
End: 2019-05-20

## 2019-05-20 VITALS
BODY MASS INDEX: 25.03 KG/M2 | TEMPERATURE: 96.5 F | RESPIRATION RATE: 18 BRPM | HEIGHT: 69 IN | DIASTOLIC BLOOD PRESSURE: 72 MMHG | SYSTOLIC BLOOD PRESSURE: 136 MMHG | OXYGEN SATURATION: 96 % | HEART RATE: 56 BPM | WEIGHT: 169 LBS

## 2019-05-20 DIAGNOSIS — G47.00 INSOMNIA, UNSPECIFIED TYPE: ICD-10-CM

## 2019-05-20 DIAGNOSIS — Z87.891 HISTORY OF TOBACCO ABUSE: ICD-10-CM

## 2019-05-20 DIAGNOSIS — Z13.31 SCREENING FOR DEPRESSION: ICD-10-CM

## 2019-05-20 DIAGNOSIS — R73.03 PREDIABETES: Primary | ICD-10-CM

## 2019-05-20 DIAGNOSIS — Z00.00 MEDICARE ANNUAL WELLNESS VISIT, SUBSEQUENT: ICD-10-CM

## 2019-05-20 DIAGNOSIS — E78.49 OTHER HYPERLIPIDEMIA: ICD-10-CM

## 2019-05-20 DIAGNOSIS — Z85.038 HISTORY OF COLON CANCER: ICD-10-CM

## 2019-05-20 DIAGNOSIS — D69.6 THROMBOCYTOPENIA (HCC): ICD-10-CM

## 2019-05-20 PROBLEM — G47.09 OTHER INSOMNIA: Status: ACTIVE | Noted: 2019-05-20

## 2019-05-20 PROBLEM — D12.3 ADENOMATOUS POLYP OF TRANSVERSE COLON: Status: ACTIVE | Noted: 2019-05-20

## 2019-05-20 NOTE — PROGRESS NOTES
1. Have you been to the ER, urgent care clinic since your last visit? Hospitalized since your last visit? No 
 
2. Have you seen or consulted any other health care providers outside of the [address] [place] [address] since your last visit? Include any pap smears or colon screening.  No

## 2019-05-20 NOTE — PATIENT INSTRUCTIONS
Medicare Wellness Visit, Male The best way to live healthy is to have a lifestyle where you eat a well-balanced diet, exercise regularly, limit alcohol use, and quit all forms of tobacco/nicotine, if applicable. Regular preventive services are another way to keep healthy. Preventive services (vaccines, screening tests, monitoring & exams) can help personalize your care plan, which helps you manage your own care. Screening tests can find health problems at the earliest stages, when they are easiest to treat. [facility] follows the current, evidence-based guidelines published by the [facility] ([facility]) when recommending preventive services for our patients. Because we follow these guidelines, sometimes recommendations change over time as research supports it. (For example, a prostate screening blood test is no longer routinely recommended for men with no symptoms.) Of course, you and your doctor may decide to screen more often for some diseases, based on your risk and co-morbidities (chronic disease you are already diagnosed with). Preventive services for you include: - Medicare offers their members a free annual wellness visit, which is time for you and your primary care provider to discuss and plan for your preventive service needs. Take advantage of this benefit every year! 
-All adults over age 72 should receive the recommended pneumonia vaccines. Current USPSTF guidelines recommend a series of two vaccines for the best pneumonia protection.  
-All adults should have a flu vaccine yearly and an ECG.  All adults age 61 and older should receive a shingles vaccine once in their lifetime.   
-All adults age 38-68 who are overweight should have a diabetes screening test once every three years.  
-Other screening tests & preventive services for persons with diabetes include: an eye exam to screen for diabetic retinopathy, a kidney function test, a foot exam, and stricter control over your cholesterol.  
-Cardiovascular screening for adults with routine risk involves an electrocardiogram (ECG) at intervals determined by the provider.  
-Colorectal cancer screening should be done for adults age 54-65 with no increased risk factors for colorectal cancer. There are a number of acceptable methods of screening for this type of cancer. Each test has its own benefits and drawbacks. Discuss with your provider what is most appropriate for you during your annual wellness visit. The different tests include: colonoscopy (considered the best screening method), a fecal occult blood test, a fecal DNA test, and sigmoidoscopy. 
-All adults born between [facility] should be screened once for Hepatitis C. 
-An Abdominal Aortic Aneurysm (AAA) Screening is recommended for men age 73-68 who has ever smoked in their lifetime. Here is a list of your current Health Maintenance items (your personalized list of preventive services) with a due date: 
Health Maintenance Due Topic Date Due  
 DTaP/Tdap/Td  (1 - Tdap) 11/22/1965  Shingles Vaccine (1 of 2) 11/22/1994  Stool testing for trace blood  11/22/1994  Glaucoma Screening   11/22/2009  AAA Screening  11/22/2009  Pneumococcal Vaccine (1 of 2 - PCV13) 11/22/2009 [facility] Annual Well Visit  03/14/2018

## 2019-05-30 ENCOUNTER — HOSPITAL ENCOUNTER (OUTPATIENT)
Dept: CT IMAGING | Age: 74
Discharge: HOME | End: 2019-05-30
Attending: INTERNAL MEDICINE
Payer: MEDICARE

## 2019-05-30 DIAGNOSIS — Z87.891 HISTORY OF TOBACCO ABUSE: ICD-10-CM

## 2019-05-30 PROCEDURE — G0297 LDCT FOR LUNG CA SCREEN: HCPCS

## 2019-05-31 ENCOUNTER — HOSPITAL ENCOUNTER (OUTPATIENT)
Dept: LAB | Age: 74
Discharge: HOME | End: 2019-05-31
Payer: MEDICARE

## 2019-05-31 PROCEDURE — 36415 COLL VENOUS BLD VENIPUNCTURE: CPT

## 2019-05-31 PROCEDURE — 82947 ASSAY GLUCOSE BLOOD QUANT: CPT

## 2019-06-01 LAB — GLUCOSE SERPL-MCNC: 84 MG/DL (ref 65–99)

## 2019-09-22 ENCOUNTER — PATIENT MESSAGE (OUTPATIENT)
Dept: INTERNAL MEDICINE CLINIC | Age: 74
End: 2019-09-22

## 2019-09-22 DIAGNOSIS — G47.00 INSOMNIA, UNSPECIFIED TYPE: Primary | ICD-10-CM

## 2019-09-22 RX ORDER — ZOLPIDEM TARTRATE 6.25 MG/1
6.25 TABLET, FILM COATED, EXTENDED RELEASE ORAL
Qty: 30 TAB | Refills: 2 | Status: SHIPPED | OUTPATIENT
Start: 2019-09-22 | End: 2020-12-01 | Stop reason: SDUPTHER

## 2019-10-10 DIAGNOSIS — I25.10 ATHEROSCLEROSIS OF NATIVE CORONARY ARTERY OF NATIVE HEART WITHOUT ANGINA PECTORIS: ICD-10-CM

## 2019-10-10 RX ORDER — METOPROLOL SUCCINATE 25 MG/1
TABLET, EXTENDED RELEASE ORAL
Qty: 90 TAB | Refills: 0 | Status: SHIPPED | OUTPATIENT
Start: 2019-10-10 | End: 2019-12-06 | Stop reason: SDUPTHER

## 2019-11-20 ENCOUNTER — TELEPHONE (OUTPATIENT)
Dept: INTERNAL MEDICINE CLINIC | Age: 74
End: 2019-11-20

## 2019-11-20 NOTE — TELEPHONE ENCOUNTER
Dr. Juanito Hartley   Received: Today   Message Contents   Diamond Job sent to [facility]             Former patient of Dr. Nirmal Crawford requesting to reestablish care with Dr. [facility]. Seeking an earlier appointment for this year. Currently scheduled for 2/24/20 with Dr. [facility] at 10:20 am. Patient is currently out of medications and needs a refill and is unable to wait until February 2020.  Best contact number is [phone].

## 2019-12-06 ENCOUNTER — OFFICE VISIT (OUTPATIENT)
Dept: INTERNAL MEDICINE CLINIC | Age: 74
End: 2019-12-06

## 2019-12-06 VITALS
HEART RATE: 51 BPM | BODY MASS INDEX: 24.73 KG/M2 | TEMPERATURE: 97.8 F | SYSTOLIC BLOOD PRESSURE: 143 MMHG | OXYGEN SATURATION: 93 % | DIASTOLIC BLOOD PRESSURE: 80 MMHG | WEIGHT: 167 LBS | HEIGHT: 69 IN | RESPIRATION RATE: 18 BRPM

## 2019-12-06 DIAGNOSIS — E78.00 HYPERCHOLESTEREMIA: ICD-10-CM

## 2019-12-06 DIAGNOSIS — M54.42 CHRONIC LEFT-SIDED LOW BACK PAIN WITH LEFT-SIDED SCIATICA: ICD-10-CM

## 2019-12-06 DIAGNOSIS — I25.10 ATHEROSCLEROSIS OF NATIVE CORONARY ARTERY OF NATIVE HEART WITHOUT ANGINA PECTORIS: ICD-10-CM

## 2019-12-06 DIAGNOSIS — I10 BENIGN ESSENTIAL HTN: ICD-10-CM

## 2019-12-06 DIAGNOSIS — G47.00 INSOMNIA, UNSPECIFIED TYPE: ICD-10-CM

## 2019-12-06 DIAGNOSIS — J40 BRONCHITIS: Primary | ICD-10-CM

## 2019-12-06 DIAGNOSIS — G89.29 CHRONIC LEFT-SIDED LOW BACK PAIN WITH LEFT-SIDED SCIATICA: ICD-10-CM

## 2019-12-06 DIAGNOSIS — R41.89 COGNITIVE CHANGES: ICD-10-CM

## 2019-12-06 DIAGNOSIS — E78.49 OTHER HYPERLIPIDEMIA: ICD-10-CM

## 2019-12-06 PROBLEM — D49.2 LUMBAR SPINE TUMOR: Status: ACTIVE | Noted: 2018-06-01

## 2019-12-06 RX ORDER — OXYMETAZOLINE HYDROCHLORIDE 0.05 G/100ML
2 SPRAY NASAL 2 TIMES DAILY
Qty: 1 BOTTLE | Refills: 3
Start: 2019-12-06 | End: 2019-12-09

## 2019-12-06 RX ORDER — GUAIFENESIN 600 MG/1
600 TABLET, EXTENDED RELEASE ORAL 2 TIMES DAILY
Qty: 20 TAB | Refills: 0
Start: 2019-12-06 | End: 2019-12-16

## 2019-12-06 RX ORDER — METOPROLOL SUCCINATE 25 MG/1
TABLET, EXTENDED RELEASE ORAL
Qty: 90 TAB | Refills: 1 | Status: SHIPPED | COMMUNITY
Start: 2019-12-06 | End: 2020-10-04

## 2019-12-06 RX ORDER — ROSUVASTATIN CALCIUM 10 MG/1
10 TABLET, FILM COATED ORAL
Qty: 90 TAB | Refills: 1 | Status: SHIPPED | COMMUNITY
Start: 2019-12-06 | End: 2020-04-20

## 2019-12-06 RX ORDER — TRAZODONE HYDROCHLORIDE 100 MG/1
100 TABLET ORAL
Qty: 30 TAB | Refills: 1 | Status: SHIPPED | OUTPATIENT
Start: 2019-12-06 | End: 2020-01-31

## 2019-12-06 NOTE — PROGRESS NOTES
HISTORY OF PRESENT ILLNESS    Chief Complaint   Patient presents with    New Patient     used to see dr. Bain Running, back pain and left leg tingling after back surgery, has a lot of congestion    Hypertension     does not know any other conditions that he has to have pcp monitor, said we need to read his chart     Immunization/Injection     needs second part of shingles, but is late on getting the second dose       Presents for follow-up. He was previously a patient of Dr. Charley Moreau who retired. He then established care with Dr. Caitlin Cedeno this summer but wanted to come back to this practice. So, he is a new patient to me today. Patient is a retired .  to [place]. 2 children out of town. Keeps busy by playing pickle ball. Reports cough, mild chest congestion for about 4 days. No fevers or chills. No shortness of breath. No sick contacts. Needs second Shingrix vaccine. Hypertension  Hypertension ROS: taking medications as instructed, no medication side effects noted, no TIA's, no chest pain on exertion, no dyspnea on exertion, no swelling of ankles     reports that he quit smoking about 7 years ago. He has a 50.00 pack-year smoking history. He has never used smokeless tobacco.    reports current alcohol use of about 5.0 standard drinks of alcohol per week. BP Readings from Last 2 Encounters:   12/06/19 143/80   05/20/19 136/72       Reports a history of chronic insomnia. Says that he has been having difficulty falling asleep and difficulty staying asleep for years. He wakes up once or twice to urinate at times. Also has some pain in his left shoulder and arm which she attributes to a nerve block for carpal tunnel syndrome several years ago. He has tried multiple medications including melatonin, zolpidem ER, Xanax and Unisom for sleep. They all help a little bit. Melatonin did not help at all though. He has read in her that zolpidem and Xanax may cause memory issues.   He does feel little scattered in thinking and has some difficulty with recall of names and events at times. He feels that these are all because he is not sleeping long enough. We will take zolpidem and sleep from 11 PM to 1 AM and then wake up to urinate, then can fall asleep again until 4 AM, but not cannot fall asleep again. It appears he did consult with Dr. Sunshine Grace in sleep clinic in August 2014. He did have a sleep study which did not reveal any sleep apnea. Chronic lower back pain with left leg paresthesias. He did have a spinal tumor with excision and summer 2018 with Dr. Augusto Sandoval. Symptoms did improve a lot, but still has some intermittent pain and paresthesias. He is active and can play pickleball without difficulty. Review of Systems   All other systems reviewed and are negative, except as noted in HPI    Past Medical and Surgical History   has a past medical history of Benign essential HTN, BPH with obstruction/lower urinary tract symptoms, CAD (coronary artery disease) (1993), Carpal tunnel syndrome on left, Chronic left-sided low back pain with left-sided sciatica, Cognitive changes, Colon cancer ([facility] [address] (2001), Colon polyp, Coronary atherosclerosis of native coronary artery (11/22/2013), History of myocardial infarction, Hypercholesteremia, Lumbar spine tumor (06/2018), Tachycardia, and Thrombocytopenia ([facility] [address] (5/20/2019). has a past surgical history that includes hx hernia repair; hx colectomy (2001); colonoscopy (N/A, 5/9/2019); hx turp (2008); hx tonsillectomy; hx carpal tunnel release (Left); hx coronary artery bypass graft (1999); hx heart catheterization; and hx lumbar diskectomy (06/2018). reports that he quit smoking about 7 years ago. He has a 50.00 pack-year smoking history. He has never used smokeless tobacco. He reports current alcohol use of about 5.0 standard drinks of alcohol per week. He reports current drug use. Drug: Marijuana.   family history includes Cancer in his maternal grandmother; Diabetes in his mother; Heart Disease in his father and paternal grandfather. Physical Exam   Nursing note and vitals reviewed. Blood pressure 143/80, pulse (!) 51, temperature 97.8 °F (36.6 °C), temperature source Oral, resp. rate 18, height 5' 9\" (1.753 m), weight 167 lb (75.8 kg), SpO2 93 %. Constitutional:  No distress. Eyes: Conjunctivae are normal.   Ears:  Hearing grossly intact  Cardiovascular: Normal rate. regular rhythm, no murmurs or gallops  No edema  Pulmonary/Chest: Effort normal.   CTAB  Musculoskeletal: moves all 4 extremities   Neurological: Alert and oriented to person, place, and time. Skin: No rash noted. Psychiatric: Normal mood and affect. Behavior is normal.     ASSESSMENT and PLAN  Diagnoses and all orders for this visit:    1. Bronchitis  Mild symptoms. Viral.  Reassured that he can take Mucinex and sparing use of Afrin with hypertension. Blood pressure is reasonably controlled. Avoid any Sudafed or decongestants otherwise.  -     guaiFENesin ER (MUCINEX) 600 mg ER tablet; Take 1 Tab by mouth two (2) times a day for 10 days.  -     AFRIN SINUS, OXYMETAZOLINE, 0.05 % nasal spray; 2 Sprays by Both Nostrils route two (2) times a day for 3 days. 2. Chronic left-sided low back pain with left-sided sciatica  Stable. History of spinal surgery. Can follow-up with neurosurgeon as needed. 3. Insomnia, unspecified type  This is a chronic relatively severe issue. Recommend that he try to severely limit intake of sedative-hypnotics such as zolpidem and alprazolam and also should limit intake of antihistamines such as diphenhydramine. He has been taking all 3 of these intermittently. May cause cognitive changes which he is complaining of. Counseled on sleep hygiene. Trial of trazodone. May consider other options such as mirtazapine as next steps. -     traZODone (DESYREL) 100 mg tablet; Take 1 Tab by mouth nightly.     4. Cognitive changes  Occasional word finding issues. Processing issues. Lack of sleep may be contributing, but medication side effects also are likely reinvolved. Cannot rule out early dementia. We will readdress next visit. Return to clinic if any concerns. 5. Atherosclerosis of native coronary artery of native heart without angina pectoris  Currently asymptomatic  Currently does not see cardiology. Continue current medications for now. -     metoprolol succinate (TOPROL-XL) 25 mg XL tablet; TAKE 1 TABLET BY MOUTH EVERY NIGHT    6. Other hyperlipidemia  Controlled on current regimen. Continue current medications as written in chart.  -     CRESTOR 10 mg tablet; Take 1 Tab by mouth nightly. BRANDED IS REQUIRED  Indications: excessive fat in the blood    7. Benign essential HTN  Controlled on current regimen. Continue current medications as written in chart. 8. Hypercholesteremia  Controlled on current regimen. Continue current medications as written in chart. There are no Patient Instructions on file for this visit.    lab results and schedule of future lab studies reviewed with patient  reviewed medications and side effects in detail    Return to clinic for further evaluation if new symptoms develop    Follow-up and Dispositions    · Return in about 4 months (around 4/6/2020) for labs. Current Outpatient Medications   Medication Sig    guaiFENesin ER (MUCINEX) 600 mg ER tablet Take 1 Tab by mouth two (2) times a day for 10 days.  traZODone (DESYREL) 100 mg tablet Take 1 Tab by mouth nightly.  AFRIN SINUS, OXYMETAZOLINE, 0.05 % nasal spray 2 Sprays by Both Nostrils route two (2) times a day for 3 days.  CRESTOR 10 mg tablet Take 1 Tab by mouth nightly. BRANDED IS REQUIRED  Indications: excessive fat in the blood    metoprolol succinate (TOPROL-XL) 25 mg XL tablet TAKE 1 TABLET BY MOUTH EVERY NIGHT    zolpidem CR (AMBIEN CR) 6.25 mg tablet Take 1 Tab by mouth nightly as needed for Sleep.  Max Daily Amount: 4.61 mg.    folic acid (FOLVITE) 1 mg tablet Take 1 Tab by mouth nightly.  niacin (NIASPAN) 1,000 mg Tb24 tab Take 2 Tabs by mouth nightly.  ALPRAZolam (XANAX) 0.5 mg tablet Take 1 Tab by mouth nightly as needed. Max Daily Amount: 0.5 mg.    Aspirin, Buffered 81 mg tab Take 162 mg by mouth nightly.  omega-3 fatty acids-vitamin e (FISH OIL) 1,000 mg cap Take 1 Cap by mouth two (2) times a day.  multivitamins-minerals-lutein (CENTRUM SILVER) tab Take 1 Tab by mouth daily.  ascorbic acid (VITAMIN C) 500 mg tablet Take 500 mg by mouth daily. No current facility-administered medications for this visit.

## 2019-12-11 DIAGNOSIS — I25.10 ATHEROSCLEROSIS OF NATIVE CORONARY ARTERY OF NATIVE HEART WITHOUT ANGINA PECTORIS: ICD-10-CM

## 2019-12-11 DIAGNOSIS — E78.49 OTHER HYPERLIPIDEMIA: ICD-10-CM

## 2019-12-11 RX ORDER — NIACIN 1000 MG/1
2000 TABLET, EXTENDED RELEASE ORAL
Qty: 180 TAB | Refills: 1 | Status: SHIPPED | OUTPATIENT
Start: 2019-12-11 | End: 2020-04-20

## 2019-12-11 RX ORDER — FOLIC ACID 1 MG/1
1 TABLET ORAL
Qty: 90 TAB | Refills: 1 | Status: SHIPPED | OUTPATIENT
Start: 2019-12-11 | End: 2020-04-20

## 2019-12-16 DIAGNOSIS — F41.9 ANXIETY: ICD-10-CM

## 2019-12-16 RX ORDER — ALPRAZOLAM 0.5 MG/1
0.5 TABLET ORAL
Qty: 30 TAB | Refills: 1 | Status: SHIPPED | COMMUNITY
Start: 2019-12-16 | End: 2020-04-20 | Stop reason: SDUPTHER

## 2020-01-27 NOTE — PROGRESS NOTES
Pastoral Care Progress Note    2020  Patient: Tony Toledo : 1942  Admission Date & Time: 2020  MRN: [id number] CSN: [id number]                     Chaplaincy Interventions Utilized:   Empowerment: Normalized experience of patient/family    Exploration: Explored emotional needs & resources    Collaboration: Consulted with interdisciplinary team    Relationship Building: Listened empathically        Offered family hospitals     -Shama Munoz This is the Subsequent Medicare Annual Wellness Exam, performed 12 months or more after the Initial AWV or the last Subsequent AWV    I have reviewed the patient's medical history in detail and updated the computerized patient record. History     Past Medical History:   Diagnosis Date    CAD (coronary artery disease) 1993    [facility]     Cancer ([facility] [address] 2001    colon - partial colectomy    Colon polyp     Heart disease     Hypercholesteremia     Hypertension     MI, old     Tachycardia       Past Surgical History:   Procedure Laterality Date    CARDIAC SURG PROCEDURE UNLIST      cabg x 3, multiple stents. -     COLONOSCOPY N/A 5/9/2019    COLONOSCOPY performed by Juan R Shepherd MD at [facility] HX COLECTOMY      partial    HX HEENT      tonsillectomy    HX HERNIA REPAIR      bilateral inguinal    HX TURP      HX UROLOGICAL      TURP     Current Outpatient Medications   Medication Sig Dispense Refill    varicella-zoster recombinant, PF, (SHINGRIX, PF,) 50 mcg/0.5 mL susr injection 0.5mL by IntraMUSCular route once now and then repeat in 2-6 months 0.5 mL 1    pneumococcal 13 dwayne conj dip (PREVNAR 13, PF,) 0.5 mL syrg injection 0.5 mL by IntraMUSCular route once for 1 dose. 0.5 mL 0    CRESTOR 10 mg tablet Take 1 Tab by mouth nightly. BRANDED IS REQUIRED 90 Tab 1    folic acid (FOLVITE) 1 mg tablet Take 1 Tab by mouth nightly. 90 Tab 1    metoprolol succinate (TOPROL-XL) 25 mg XL tablet Take 1 Tab by mouth nightly. 90 Tab 1    niacin (NIASPAN) 1,000 mg Tb24 tab Take 2 Tabs by mouth nightly. 180 Tab 1    zolpidem CR (AMBIEN CR) 12.5 mg tablet Take 1 Tab by mouth nightly as needed for Sleep. Max Daily Amount: 12.5 mg. 30 Tab 2    ALPRAZolam (XANAX) 0.5 mg tablet Take 1 Tab by mouth nightly as needed. Max Daily Amount: 0.5 mg. 30 Tab 1    MELATONIN PO Take  by mouth. Indications: PRN      Aspirin, Buffered 81 mg tab Take 162 mg by mouth nightly.       omega-3 fatty acids-vitamin e (FISH OIL) 1,000 mg cap Take 1 Cap by mouth two (2) times a day.  multivitamins-minerals-lutein (CENTRUM SILVER) tab Take 1 Tab by mouth daily.  ascorbic acid (VITAMIN C) 500 mg tablet Take 500 mg by mouth daily.  DIPHENHYDRAMINE HCL (UNISOM SLEEPGELS PO) Take  by mouth. Indications: PRN       Allergies   Allergen Reactions    Rosuvastatin Myalgia     Generic rosuvastatin causes myalgias     Family History   Problem Relation Age of Onset    Heart Disease Father     Diabetes Mother     Heart Disease Paternal Grandfather     Cancer Maternal Grandmother         pancreatic     Social History     Tobacco Use    Smoking status: Former Smoker     Packs/day: 1.00     Years: 50.00     Pack years: 50.00     Last attempt to quit: 2012     Years since quittin.0    Smokeless tobacco: Never Used   Substance Use Topics    Alcohol use: Yes     Alcohol/week: 3.0 oz     Types: 3 Cans of beer, 2 Standard drinks or equivalent per week     Patient Active Problem List   Diagnosis Code    Coronary atherosclerosis of native coronary artery I25.10    Other hyperlipidemia E78.49    Tonsil, pharyngeal J35.9    History of colon cancer Z85.038    Hypertension I10       Depression Risk Factor Screening:     3 most recent PHQ Screens 2019   Little interest or pleasure in doing things Not at all   Feeling down, depressed, irritable, or hopeless Not at all   Total Score PHQ 2 0     Alcohol Risk Factor Screening: You do not drink alcohol or very rarely. Functional Ability and Level of Safety:   Hearing Loss  The patient needs further evaluation. Activities of Daily Living  The home contains: no safety equipment. Patient does total self care    Fall Risk  Fall Risk Assessment, last 12 mths 2019   Able to walk? Yes   Fall in past 12 months? Yes   Fall with injury?  No   Number of falls in past 12 months 5   Fall Risk Score 5       Abuse Screen  Patient is not abused    Cognitive Screening   Evaluation of Cognitive Function:  Has your family/caregiver stated any concerns about your memory: no  Normal    Patient Care Team   Patient Care Team:  Tono Christopher MD as PCP - General (Internal Medicine)    Assessment/Plan   Education and counseling provided:  Are appropriate based on today's review and evaluation  End-of-Life planning (with patient's consent)  Pneumococcal Vaccine    Diagnoses and all orders for this visit:    1. Medicare annual wellness visit, subsequent  -     varicella-zoster recombinant, PF, (SHINGRIX, PF,) 50 mcg/0.5 mL susr injection; 0.5mL by IntraMUSCular route once now and then repeat in 2-6 months  -     pneumococcal 13 dwayne conj dip (PREVNAR 13, PF,) 0.5 mL syrg injection; 0.5 mL by IntraMUSCular route once for 1 dose. 2. Screening for depression  -     [place] Maintenance Due   Topic Date Due    DTaP/Tdap/Td series (1 - Tdap) 11/22/1965    Shingrix Vaccine Age 50> (1 of 2) 11/22/1994    FOBT Q 1 YEAR AGE 50-75  11/22/1994    GLAUCOMA SCREENING Q2Y  11/22/2009    AAA Screening 73-69 YO Male Smoking Patients  11/22/2009    Pneumococcal 65+ years (1 of 2 - PCV13) 11/22/2009    MEDICARE YEARLY EXAM  03/14/2018     Subjective: This is a new patient to me. He has been followed for hypertension and dyslipidemia for a number of years, had bypass surgery in 1989. Has switched to different cardiology now and I reviewed the note from Dr. Francie Wiseman from last month. He did not change anything and left all his meds the same, including niacin. The patient says he has not had any trouble with niacin and he tolerates it, so why not continue it. He is not sure a lot about vaccines. He has no records of any when he got things. He is up to date on colonoscopy as he just got a colonoscopy. We talked some about memory loss and I have the feeling that he is having some memory loss and some mild changes cognitively than he had before.  He is screened for glaucoma all the time.  He goes to ophthalmology on a regular basis. He had lab work and did have sugar in his coffee and his fasting glucose was in the 140s, so that needs to be repeated. He has had a low platelet count on a couple occasions, the last one was borderline. He does not know anything about it, says no one has ever mentioned it to him. Denies any cardiovascular symptoms. Is pretty active. His balance is not quite as good. He had back surgery last year. He does not play competitive tennis anymore, but does play indoor pickle ball. Denies bladder issues. In addition, he brings in a low dose CT screen done in January of 2018 at [address] [place] [address]. It did not show any significant abnormalities. A 2 mm micronodule in the left lower lobe. He had some calcium in his aorta. It was negative for aneurysms. His chest was clear. They recommended a follow up. He is 75 and he quit smoking. He only quit smoking around 10 years ago, so he certainly is a candidate for this. Plan:  1. Discussed the pros and cons. I told him there is no good data beyond age 75, but he should certainly get screened now and when he is 75 and after that screening would probably be of no use. Vitals:    05/20/19 1034 05/20/19 1117   BP: 129/75 136/72   Pulse: (!) 56    Resp: 18    Temp: 96.5 °F (35.8 °C)    TempSrc: Oral    SpO2: 96%    Weight: 169 lb (76.7 kg)    Height: 5' 9\" (1.753 m)      Chest clear  Reg rhythm     feet warm  Neuro non focal        Advance Care Planning (ACP) Provider Conversation Snapshot    Date of ACP Conversation: 05/20/19  Persons included in Conversation:  patient  Length of ACP Conversation in minutes:  <16 minutes (Non-Billable)    Authorized Decision Maker (if patient is incapable of making informed decisions):    This person is:   Healthcare Agent/Medical Power of  under Advance Directive            For Patients with Decision Making Capacity:   Values/Goals: Exploration of values, goals, and preferences if recovery is not expected, even with continued medical treatment in the event of:  Severe, permanent brain injury    Conversation Outcomes / Follow-Up Plan:   Recommended completion of Advance Directive form after review of ACP materials and conversation with prospective healthcare agent   Recommended communicating the plan and making copies for the healthcare agent, personal physician, and others as appropriate (e.g., health system)  Recommended review of completed ACP document annually or upon change in health status      I also reviewed his sleep issues and told him I am not a fan of him taking sleeping pills. He asks what else he can do. He was seen in 2014 by the sleep lab and they gave him all kinds of things to try, which he has not tried. He says he sits in bed or lays in bed with a tablet and reads books because it is lightweight. I told him that the electronics would increase his sleep disorder. I told him to avoid all caffeine and alcohol in the evening, get plenty of exercise, work on sleep without medications. He is also on a high dose and I told him that his cognitive issues will worsen on high doses of benzo, so the next step would be to lower the dose of his Ambien CR and work on discontinuing it.

## 2020-04-20 DIAGNOSIS — E78.49 OTHER HYPERLIPIDEMIA: ICD-10-CM

## 2020-04-20 DIAGNOSIS — F41.9 ANXIETY: ICD-10-CM

## 2020-04-20 DIAGNOSIS — I25.10 ATHEROSCLEROSIS OF NATIVE CORONARY ARTERY OF NATIVE HEART WITHOUT ANGINA PECTORIS: ICD-10-CM

## 2020-04-20 RX ORDER — ROSUVASTATIN CALCIUM 10 MG/1
10 TABLET, FILM COATED ORAL
Qty: 90 TAB | Refills: 1 | OUTPATIENT
Start: 2020-04-20

## 2020-04-20 RX ORDER — FOLIC ACID 1 MG/1
TABLET ORAL
Qty: 90 TAB | Refills: 1 | Status: SHIPPED | OUTPATIENT
Start: 2020-04-20 | End: 2020-10-04

## 2020-04-20 RX ORDER — NIACIN 1000 MG/1
2000 TABLET, EXTENDED RELEASE ORAL
Qty: 180 TAB | Refills: 1 | OUTPATIENT
Start: 2020-04-20

## 2020-04-20 RX ORDER — NIACIN 1000 MG/1
TABLET, EXTENDED RELEASE ORAL
Qty: 180 TAB | Refills: 1 | Status: SHIPPED | OUTPATIENT
Start: 2020-04-20 | End: 2020-10-04

## 2020-04-20 RX ORDER — ALPRAZOLAM 0.5 MG/1
0.5 TABLET ORAL
Qty: 30 TAB | Refills: 1 | Status: CANCELLED | OUTPATIENT
Start: 2020-04-20

## 2020-04-20 RX ORDER — ROSUVASTATIN CALCIUM 10 MG/1
TABLET, FILM COATED ORAL
Qty: 90 TAB | Refills: 1 | Status: SHIPPED | OUTPATIENT
Start: 2020-04-20 | End: 2020-10-04

## 2020-04-20 RX ORDER — ALPRAZOLAM 0.5 MG/1
0.5 TABLET ORAL
Qty: 30 TAB | Refills: 1 | Status: SHIPPED | COMMUNITY
Start: 2020-04-20 | End: 2020-12-01 | Stop reason: SDUPTHER

## 2020-04-20 RX ORDER — FOLIC ACID 1 MG/1
1 TABLET ORAL
Qty: 90 TAB | Refills: 1 | OUTPATIENT
Start: 2020-04-20

## 2020-04-22 ENCOUNTER — VIRTUAL VISIT (OUTPATIENT)
Dept: INTERNAL MEDICINE CLINIC | Age: 75
End: 2020-04-22

## 2020-04-22 VITALS — WEIGHT: 167 LBS | BODY MASS INDEX: 24.66 KG/M2 | TEMPERATURE: 97 F

## 2020-04-22 DIAGNOSIS — E78.00 HYPERCHOLESTEREMIA: ICD-10-CM

## 2020-04-22 DIAGNOSIS — D69.6 THROMBOCYTOPENIA (HCC): ICD-10-CM

## 2020-04-22 DIAGNOSIS — G47.09 OTHER INSOMNIA: ICD-10-CM

## 2020-04-22 DIAGNOSIS — Z00.00 MEDICARE ANNUAL WELLNESS VISIT, SUBSEQUENT: Primary | ICD-10-CM

## 2020-04-22 DIAGNOSIS — M79.2 NEUROPATHIC PAIN, ARM: ICD-10-CM

## 2020-04-22 DIAGNOSIS — K59.09 CONSTIPATION, CHRONIC: ICD-10-CM

## 2020-04-22 DIAGNOSIS — I10 BENIGN ESSENTIAL HTN: ICD-10-CM

## 2020-04-22 RX ORDER — SENNOSIDES 8.6 MG/1
CAPSULE, GELATIN COATED ORAL
Qty: 30 CAP | Refills: 5
Start: 2020-04-22 | End: 2020-12-01 | Stop reason: ALTCHOICE

## 2020-04-22 RX ORDER — GABAPENTIN 300 MG/1
300 CAPSULE ORAL
Qty: 30 CAP | Refills: 1 | Status: SHIPPED | OUTPATIENT
Start: 2020-04-22 | End: 2020-05-14 | Stop reason: SDUPTHER

## 2020-04-22 NOTE — PROGRESS NOTES
Consent: Simone King, who was seen by synchronous (real-time) audio-video technology, and/or his healthcare decision maker, is aware that this patient-initiated, Telehealth encounter on 4/22/2020 is a billable service, with coverage as determined by his insurance carrier. He is aware that he may receive a bill and has provided verbal consent to proceed: YES  712  Subjective:   Simone King is a 75 y.o. male who was seen for Annual Wellness Visit (meds and labs )    He is surprised and frustrated that his medications were only filled for 3 months when I met him in December. Plan was to come in for a wellness visit and labs around this time. Says that other doctors have felt medications for 1 year. Insomnia  Chronic relatively significant insomnia. He has been taking Ambien CR, Xanax, Unisom as needed. Take benzos or Ambien because of concerns of memory issues. History of normal sleep study in 2014. He was given a trazodone prescription by me in December 2019. Says \" it is the worst medicine I have ever tried\" since 100 mg will make him sleep all night until 5 PM the next day. Half a pill does not help him sleep much at all. He is troubled by ongoing pains secondary to history of spinal surgeries. He has some neuropathic pains of his back and of his neck and especially his left trapezius region. This pain does keep him awake. He has to adjust how he thinks because of this. Has never taken any medication for neuropathic pain. Hypertension  Hypertension ROS: taking medications as instructed, no medication side effects noted, no TIA's, no chest pain on exertion, no dyspnea on exertion, no swelling of ankles     reports that he quit smoking about 7 years ago. He has a 50.00 pack-year smoking history. He has never used smokeless tobacco.    reports current alcohol use of about 5.0 standard drinks of alcohol per week.    BP Readings from Last 2 Encounters:   12/06/19 143/80   05/20/19 136/72 Hyperlipidemia  ROS: taking medications as instructed, no medication side effects noted  No new myalgias, no joint pains, no weakness  No TIA's, no chest pain on exertion, no dyspnea on exertion, no swelling of ankles. Lab Results   Component Value Date/Time    Cholesterol, total 120 03/05/2019 09:17 AM    HDL Cholesterol 42 03/05/2019 09:17 AM    LDL, calculated 60 03/05/2019 09:17 AM    VLDL, calculated 18 03/05/2019 09:17 AM    Triglyceride 89 03/05/2019 09:17 AM     Chronic constipation. Has been taking MiraLAX with only partial relief. Would like to try other medications. Tries not to use laxatives. He did have a colonoscopy in May 2019 with Dr. Kaushal Carter. He has a history of partial colectomy secondary to colon cancer in 2001. Current Outpatient Medications   Medication Sig    gabapentin (NEURONTIN) 300 mg capsule Take 1 Cap by mouth nightly. Max Daily Amount: 300 mg. For left arm nerve pain and insomnia    sennosides (Senna) 8.6 mg cap Take 1 pill nightly    niacin (NIASPAN) 1,000 mg Tb24 tab TAKE 2 TABLETS NIGHTLY    folic acid (FOLVITE) 1 mg tablet TAKE 1 TABLET NIGHTLY    Crestor 10 mg tablet TAKE 1 TABLET NIGHTLY FOR  EXCESSIVE FAT IN THE BLOOD    ALPRAZolam (XANAX) 0.5 mg tablet Take 1 Tab by mouth nightly as needed for Anxiety. Max Daily Amount: 0.5 mg.    metoprolol succinate (TOPROL-XL) 25 mg XL tablet TAKE 1 TABLET BY MOUTH EVERY NIGHT    zolpidem CR (AMBIEN CR) 6.25 mg tablet Take 1 Tab by mouth nightly as needed for Sleep. Max Daily Amount: 6.25 mg.    Aspirin, Buffered 81 mg tab Take 162 mg by mouth nightly.  omega-3 fatty acids-vitamin e (FISH OIL) 1,000 mg cap Take 1 Cap by mouth two (2) times a day.  ascorbic acid (VITAMIN C) 500 mg tablet Take 500 mg by mouth daily. No current facility-administered medications for this visit.         Allergies   Allergen Reactions    Rosuvastatin Myalgia     Generic rosuvastatin causes myalgias       Past Medical History: Diagnosis Date    Benign essential HTN     BPH with obstruction/lower urinary tract symptoms     s/p TURP 2008 with worsenintg s/s    CAD (coronary artery disease) 1993    est Dr. Mon Cuff 4/2019.  s/p CABG, several stents    Carpal tunnel syndrome on left     s/p repair, caused L arm pains chronically    Chronic left-sided low back pain with left-sided sciatica     Dr. Toy London. s/p[ surgery 6/2018.    MRI 5/2018 Densely enhancing intraspinal tumor as described from L4 to the first sacral    Cognitive changes 2019    Colon cancer ([facility] [address] 2001    colon - partial colectomy    Colon polyp     Coronary atherosclerosis of native coronary artery 11/22/2013    History of myocardial infarction     Hypercholesteremia     Insomnia     chronic.  sleep study 2014 normal. Dr Carlo Varela Lumbar spine tumor 06/2018    Tachycardia     Thrombocytopenia ([facility] [address] 5/20/2019       ROS  All other systems reviewed and negative, unless mentioned in HPI    Objective:   Vital Signs: (As obtained by patient/caregiver at home)  Visit Vitals  Temp 97 °F (36.1 °C) (Oral)   Wt 167 lb (75.8 kg)   BMI 24.66 kg/m²        [INSTRUCTIONS:  \"[x]\" Indicates a positive item  \"[]\" Indicates a negative item  -- DELETE ALL ITEMS NOT EXAMINED]    Constitutional: [x] Appears well-developed and well-nourished [x] No apparent distress      [] Abnormal -     Mental status: [x] Alert and awake  [x] Oriented to person/place/time [x] Able to follow commands    [] Abnormal -     Eyes:   EOM    [x]  Normal    [] Abnormal -   Sclera  [x]  Normal    [] Abnormal -          Discharge [x]  None visible   [] Abnormal -     HENT: [x] Normocephalic, atraumatic  [] Abnormal -   [x] Mouth/Throat: Mucous membranes are moist    External Ears [x] Normal  [] Abnormal -    Neck: [x] No visualized mass [] Abnormal -     Pulmonary/Chest: [x] Respiratory effort normal   [x] No visualized signs of difficulty breathing or respiratory distress        [] Abnormal - Musculoskeletal:   [x] Normal gait with no signs of ataxia         [x] Normal range of motion of neck        [] Abnormal -     Neurological:        [x] No Facial Asymmetry (Cranial nerve 7 motor function) (limited exam due to video visit)          [x] No gaze palsy        [] Abnormal -          Skin:        [x] No significant exanthematous lesions or discoloration noted on facial skin         [] Abnormal -            Psychiatric:       [x] Normal Affect [] Abnormal -        [x] No Hallucinations    Other pertinent observable physical exam findings:-        Assessment & Plan:   Diagnoses and all orders for this visit:    2. Benign essential HTN  Controlled on current regimen. Continue current medications as written in chart  -     METABOLIC PANEL, COMPREHENSIVE; Future  -     CBC W/O DIFF; Future    3. Hypercholesteremia  Controlled on current regimen. Continue current medications as written in chart  -     LIPID PANEL; Future  -     METABOLIC PANEL, COMPREHENSIVE; Future    4. Thrombocytopenia ([address]  Unclear etiology. Repeat CBC.  -     CBC W/O DIFF; Future    5. Other insomnia   sounds like pain is greatly contributing to his insomnia. Neuropathic pain of the arm especially. Trial of gabapentin. Could consider nortriptyline although I am concerned about risk of constipation worsening  -     gabapentin (NEURONTIN) 300 mg capsule; Take 1 Cap by mouth nightly. Max Daily Amount: 300 mg. For left arm nerve pain and insomnia    6. Neuropathic pain, arm  Is a significant concern, chronic, initiate trial of gabapentin to use at night. Can increase as needed. Can adjust dose as needed or tolerable. -     gabapentin (NEURONTIN) 300 mg capsule; Take 1 Cap by mouth nightly. Max Daily Amount: 300 mg. For left arm nerve pain and insomnia    7. Constipation, chronic  Continue MiraLAX. Try senna. We will try to avoid stimulants long-term but will see how this works for now. Could consider option of lactulose daily. I would try to avoid Linzess given that he has had a colectomy  -     sennosides (Senna) 8.6 mg cap; Take 1 pill nightly    As long as he has his labs done, I am happy to refill his medications for 1 year from this point since we reviewed his chronic conditions and will bill for annual wellness visit per his request of yearly visits. We discussed the expected course, resolution and complications of the diagnosis(es) in detail. Medication risks, benefits, costs, interactions, and alternatives were discussed as indicated. I advised him to contact the office if his condition worsens, changes or fails to improve as anticipated. He expressed understanding with the diagnosis(es) and plan. Alto Carrel is a 75 y.o. male being evaluated by a video visit encounter for concerns as above. A caregiver was present when appropriate. Due to this being a TeleHealth encounter (During VJDOT-99 public health emergency), evaluation of the following organ systems was limited: Vitals/Constitutional/EENT/Resp/CV/GI//MS/Neuro/Skin/Heme-Lymph-Imm. Pursuant to the emergency declaration under the [facility], [facility]5 waiver authority and the [employer] Act, this Virtual  Visit was conducted, with patient's (and/or legal guardian's) consent, to reduce the patient's risk of exposure to COVID-19 and provide necessary medical care. Services were provided through a video synchronous discussion virtually to substitute for in-person clinic visit. Patient and provider were located at their individual homes. Kevin Willis MD    This is a Subsequent Medicare Annual Wellness Visit providing Personalized Prevention Plan Services (PPPS) (Performed 12 months after initial AWV and PPPS )    I have reviewed the patient's medical history in detail and updated the computerized patient record.      History     Past Medical History:   Diagnosis Date    Benign essential HTN     BPH with obstruction/lower urinary tract symptoms     s/p TURP 2008 with worsenintg s/s    CAD (coronary artery disease) 1993    est Dr. Cecilia Trevino 4/2019.  s/p CABG, several stents    Carpal tunnel syndrome on left     s/p repair, caused L arm pains chronically    Chronic left-sided low back pain with left-sided sciatica     Dr. Thelma Schafer. s/p[ surgery 6/2018. MRI 5/2018 Densely enhancing intraspinal tumor as described from L4 to the first sacral    Cognitive changes 2019    Colon cancer ([facility] [address] 2001    colon - partial colectomy    Colon polyp     Coronary atherosclerosis of native coronary artery 11/22/2013    History of myocardial infarction     Hypercholesteremia     Insomnia     chronic.  sleep study 2014 normal. Dr Roya Andrade Lumbar spine tumor 06/2018    Tachycardia     Thrombocytopenia ([facility] [address] 5/20/2019       Past Surgical History:   Procedure Laterality Date    COLONOSCOPY N/A 5/9/2019    COLONOSCOPY performed by Jez Muhammad MD at [place] [address]    caused chronic pain L arm from nerve block    HX COLECTOMY  2001    partial    HX CORONARY ARTERY BYPASS GRAFT  1999    cabg x 3    HX HEART CATHETERIZATION      multiple stents    HX HERNIA REPAIR      bilateral inguinal    HX LUMBAR DISKECTOMY  06/2018    Dr Adele Schafer. s/p spinal tumor resection    HX TONSILLECTOMY      HX TURP  2008       Current Outpatient Medications   Medication Sig    gabapentin (NEURONTIN) 300 mg capsule Take 1 Cap by mouth nightly. Max Daily Amount: 300 mg. For left arm nerve pain and insomnia    sennosides (Senna) 8.6 mg cap Take 1 pill nightly    niacin (NIASPAN) 1,000 mg Tb24 tab TAKE 2 TABLETS NIGHTLY    folic acid (FOLVITE) 1 mg tablet TAKE 1 TABLET NIGHTLY    Crestor 10 mg tablet TAKE 1 TABLET NIGHTLY FOR  EXCESSIVE FAT IN THE BLOOD    ALPRAZolam (XANAX) 0.5 mg tablet Take 1 Tab by mouth nightly as needed for Anxiety.  Max Daily Amount: 0.5 mg.  metoprolol succinate (TOPROL-XL) 25 mg XL tablet TAKE 1 TABLET BY MOUTH EVERY NIGHT    zolpidem CR (AMBIEN CR) 6.25 mg tablet Take 1 Tab by mouth nightly as needed for Sleep. Max Daily Amount: 6.25 mg.    Aspirin, Buffered 81 mg tab Take 162 mg by mouth nightly.  omega-3 fatty acids-vitamin e (FISH OIL) 1,000 mg cap Take 1 Cap by mouth two (2) times a day.  ascorbic acid (VITAMIN C) 500 mg tablet Take 500 mg by mouth daily. No current facility-administered medications for this visit. Allergies   Allergen Reactions    Rosuvastatin Myalgia     Generic rosuvastatin causes myalgias       Family History   Problem Relation Age of Onset    Heart Disease Father     Diabetes Mother     Heart Disease Paternal Grandfather     Cancer Maternal Grandmother         pancreatic        reports that he quit smoking about 7 years ago. He has a 50.00 pack-year smoking history. He has never used smokeless tobacco.   reports current alcohol use of about 5.0 standard drinks of alcohol per week. Depression Risk Factor Screening:       Alcohol Risk Factor Screening: On any occasion during the past 3 months, have you had more than 3 drinks containing alcohol? No    Do you average more than 14 drinks per week? No      Functional Ability and Level of Safety:     Hearing Loss   mild    Activities of Daily Living   Self-care. Requires assistance with: no ADLs    Fall Risk     Fall Risk Assessment, last 12 mths 12/6/2019   Able to walk? Yes   Fall in past 12 months? Yes   Fall with injury? No   Number of falls in past 12 months -   Fall Risk Score -         Abuse Screen   Patient is not abused    Review of Systems   A comprehensive review of systems was negative except for that written in the HPI.     Physical Examination     Evaluation of Cognitive Function:  Mood/affect:  neutral, happy  Appearance: age appropriate  Family member/caregiver input: none    Temperature 97 °F (36.1 °C), temperature source Oral, weight 167 lb (75.8 kg). Patient Care Team:  Tito Cruz MD as PCP - General (Internal Medicine)  Tito Cruz MD as PCP - [facility] Empaneled Provider      Advice/Referrals/Counseling   Education and counseling provided. See below for specific orders    Assessment/Plan   Diagnoses and all orders for this visit:    1. Medicare annual wellness visit, subsequent  He is completely up-to-date on all preventative maintenance. Consider ACP planning. Ernestohie Bevels Potential medication side effects were discussed with the patient; let me know if any occur.   Return for yearly Annual Wellness Visits

## 2020-04-25 DIAGNOSIS — Z12.5 SCREENING PSA (PROSTATE SPECIFIC ANTIGEN): Primary | ICD-10-CM

## 2020-04-25 DIAGNOSIS — Z12.5 SCREENING PSA (PROSTATE SPECIFIC ANTIGEN): ICD-10-CM

## 2020-04-28 ENCOUNTER — HOSPITAL ENCOUNTER (OUTPATIENT)
Dept: LAB | Age: 75
Discharge: HOME | End: 2020-04-28
Payer: MEDICARE

## 2020-04-28 PROCEDURE — 36415 COLL VENOUS BLD VENIPUNCTURE: CPT

## 2020-04-28 PROCEDURE — 84153 ASSAY OF PSA TOTAL: CPT

## 2020-04-28 PROCEDURE — 80053 COMPREHEN METABOLIC PANEL: CPT

## 2020-04-28 PROCEDURE — 80061 LIPID PANEL: CPT

## 2020-04-28 PROCEDURE — 85027 COMPLETE CBC AUTOMATED: CPT

## 2020-04-29 LAB
ALBUMIN SERPL-MCNC: 4.6 G/DL (ref 3.7–4.7)
ALBUMIN/GLOB SERPL: 2.4 {RATIO} (ref 1.2–2.2)
ALP SERPL-CCNC: 83 IU/L (ref 39–117)
ALT SERPL-CCNC: 21 IU/L (ref 0–44)
AST SERPL-CCNC: 17 IU/L (ref 0–40)
BILIRUB SERPL-MCNC: 0.5 MG/DL (ref 0–1.2)
BUN SERPL-MCNC: 24 MG/DL (ref 8–27)
BUN/CREAT SERPL: 20 (ref 10–24)
CALCIUM SERPL-MCNC: 9.7 MG/DL (ref 8.6–10.2)
CHLORIDE SERPL-SCNC: 102 MMOL/L (ref 96–106)
CHOLEST SERPL-MCNC: 104 MG/DL (ref 100–199)
CO2 SERPL-SCNC: 26 MMOL/L (ref 20–29)
CREAT SERPL-MCNC: 1.18 MG/DL (ref 0.76–1.27)
ERYTHROCYTE [DISTWIDTH] IN BLOOD BY AUTOMATED COUNT: 12 % (ref 11.6–15.4)
GLOBULIN SER CALC-MCNC: 1.9 G/DL (ref 1.5–4.5)
GLUCOSE SERPL-MCNC: 95 MG/DL (ref 65–99)
HCT VFR BLD AUTO: 43.5 % (ref 37.5–51)
HDLC SERPL-MCNC: 40 MG/DL
HGB BLD-MCNC: 14.6 G/DL (ref 13–17.7)
INTERPRETATION, 910389: NORMAL
LDLC SERPL CALC-MCNC: 51 MG/DL (ref 0–99)
MCH RBC QN AUTO: 32.8 PG (ref 26.6–33)
MCHC RBC AUTO-ENTMCNC: 33.6 G/DL (ref 31.5–35.7)
MCV RBC AUTO: 98 FL (ref 79–97)
PLATELET # BLD AUTO: 124 X10E3/UL (ref 150–450)
POTASSIUM SERPL-SCNC: 5.1 MMOL/L (ref 3.5–5.2)
PROT SERPL-MCNC: 6.5 G/DL (ref 6–8.5)
PSA SERPL-MCNC: 0.8 NG/ML (ref 0–4)
RBC # BLD AUTO: 4.45 X10E6/UL (ref 4.14–5.8)
REFLEX CRITERIA: NORMAL
SODIUM SERPL-SCNC: 141 MMOL/L (ref 134–144)
TRIGL SERPL-MCNC: 67 MG/DL (ref 0–149)
VLDLC SERPL CALC-MCNC: 13 MG/DL (ref 5–40)
WBC # BLD AUTO: 6 X10E3/UL (ref 3.4–10.8)

## 2020-05-05 ENCOUNTER — TELEPHONE (OUTPATIENT)
Dept: CARDIOLOGY CLINIC | Age: 75
End: 2020-05-05

## 2020-05-05 NOTE — TELEPHONE ENCOUNTER
Patient states he did not have his phone for VV yesterday and would like to set another one up. Please advise.      Phone: [phone]

## 2020-05-14 DIAGNOSIS — M79.2 NEUROPATHIC PAIN, ARM: ICD-10-CM

## 2020-05-14 DIAGNOSIS — G47.09 OTHER INSOMNIA: ICD-10-CM

## 2020-05-14 RX ORDER — GABAPENTIN 300 MG/1
300 CAPSULE ORAL
Qty: 30 CAP | Refills: 5 | Status: SHIPPED | OUTPATIENT
Start: 2020-05-14 | End: 2020-12-01 | Stop reason: ALTCHOICE

## 2020-10-04 DIAGNOSIS — E78.49 OTHER HYPERLIPIDEMIA: ICD-10-CM

## 2020-10-04 DIAGNOSIS — I25.10 ATHEROSCLEROSIS OF NATIVE CORONARY ARTERY OF NATIVE HEART WITHOUT ANGINA PECTORIS: ICD-10-CM

## 2020-10-04 RX ORDER — NIACIN 1000 MG/1
TABLET, EXTENDED RELEASE ORAL
Qty: 180 TAB | Refills: 1 | Status: SHIPPED | OUTPATIENT
Start: 2020-10-04 | End: 2020-12-01 | Stop reason: SDUPTHER

## 2020-10-04 RX ORDER — ROSUVASTATIN CALCIUM 10 MG/1
TABLET, FILM COATED ORAL
Qty: 90 TAB | Refills: 1 | Status: SHIPPED | OUTPATIENT
Start: 2020-10-04 | End: 2020-12-01 | Stop reason: SDUPTHER

## 2020-10-04 RX ORDER — FOLIC ACID 1 MG/1
TABLET ORAL
Qty: 90 TAB | Refills: 1 | Status: SHIPPED | OUTPATIENT
Start: 2020-10-04 | End: 2020-12-01 | Stop reason: SDUPTHER

## 2020-10-04 RX ORDER — METOPROLOL SUCCINATE 25 MG/1
TABLET, EXTENDED RELEASE ORAL
Qty: 90 TAB | Refills: 1 | Status: SHIPPED | OUTPATIENT
Start: 2020-10-04 | End: 2020-12-01 | Stop reason: SDUPTHER

## 2020-12-01 ENCOUNTER — OFFICE VISIT (OUTPATIENT)
Dept: INTERNAL MEDICINE CLINIC | Age: 75
End: 2020-12-01
Payer: MEDICARE

## 2020-12-01 VITALS
OXYGEN SATURATION: 97 % | BODY MASS INDEX: 23.55 KG/M2 | HEIGHT: 69 IN | TEMPERATURE: 97.9 F | WEIGHT: 159 LBS | DIASTOLIC BLOOD PRESSURE: 74 MMHG | SYSTOLIC BLOOD PRESSURE: 124 MMHG | HEART RATE: 54 BPM | RESPIRATION RATE: 13 BRPM

## 2020-12-01 DIAGNOSIS — R05.3 CHRONIC COUGH: ICD-10-CM

## 2020-12-01 DIAGNOSIS — E78.00 HYPERCHOLESTEREMIA: ICD-10-CM

## 2020-12-01 DIAGNOSIS — I25.10 ATHEROSCLEROSIS OF NATIVE CORONARY ARTERY OF NATIVE HEART WITHOUT ANGINA PECTORIS: ICD-10-CM

## 2020-12-01 DIAGNOSIS — M79.2 NEUROPATHIC PAIN, ARM: Primary | ICD-10-CM

## 2020-12-01 DIAGNOSIS — E78.49 OTHER HYPERLIPIDEMIA: ICD-10-CM

## 2020-12-01 DIAGNOSIS — I10 BENIGN ESSENTIAL HTN: ICD-10-CM

## 2020-12-01 DIAGNOSIS — Z23 ENCOUNTER FOR IMMUNIZATION: ICD-10-CM

## 2020-12-01 DIAGNOSIS — K59.09 CONSTIPATION, CHRONIC: ICD-10-CM

## 2020-12-01 DIAGNOSIS — G47.00 INSOMNIA, UNSPECIFIED TYPE: ICD-10-CM

## 2020-12-01 DIAGNOSIS — F41.9 ANXIETY: ICD-10-CM

## 2020-12-01 DIAGNOSIS — Z72.0 TOBACCO USE: ICD-10-CM

## 2020-12-01 PROCEDURE — 1100F PTFALLS ASSESS-DOCD GE2>/YR: CPT | Performed by: INTERNAL MEDICINE

## 2020-12-01 PROCEDURE — G8536 NO DOC ELDER MAL SCRN: HCPCS | Performed by: INTERNAL MEDICINE

## 2020-12-01 PROCEDURE — 90732 PPSV23 VACC 2 YRS+ SUBQ/IM: CPT

## 2020-12-01 PROCEDURE — G8754 DIAS BP LESS 90: HCPCS | Performed by: INTERNAL MEDICINE

## 2020-12-01 PROCEDURE — G8427 DOCREV CUR MEDS BY ELIG CLIN: HCPCS | Performed by: INTERNAL MEDICINE

## 2020-12-01 PROCEDURE — G8752 SYS BP LESS 140: HCPCS | Performed by: INTERNAL MEDICINE

## 2020-12-01 PROCEDURE — 99214 OFFICE O/P EST MOD 30 MIN: CPT | Performed by: INTERNAL MEDICINE

## 2020-12-01 PROCEDURE — G8432 DEP SCR NOT DOC, RNG: HCPCS | Performed by: INTERNAL MEDICINE

## 2020-12-01 PROCEDURE — G0463 HOSPITAL OUTPT CLINIC VISIT: HCPCS | Performed by: INTERNAL MEDICINE

## 2020-12-01 PROCEDURE — 3288F FALL RISK ASSESSMENT DOCD: CPT | Performed by: INTERNAL MEDICINE

## 2020-12-01 PROCEDURE — G8420 CALC BMI NORM PARAMETERS: HCPCS | Performed by: INTERNAL MEDICINE

## 2020-12-01 RX ORDER — GABAPENTIN 100 MG/1
400 CAPSULE ORAL
Qty: 120 CAP | Refills: 2 | Status: SHIPPED | OUTPATIENT
Start: 2020-12-01 | End: 2021-03-17

## 2020-12-01 RX ORDER — FOLIC ACID 1 MG/1
1 TABLET ORAL DAILY
Qty: 90 TAB | Refills: 3 | Status: SHIPPED | OUTPATIENT
Start: 2020-12-01 | End: 2020-12-06

## 2020-12-01 RX ORDER — ROSUVASTATIN CALCIUM 10 MG/1
TABLET, FILM COATED ORAL
Qty: 90 TAB | Refills: 3 | Status: SHIPPED | OUTPATIENT
Start: 2020-12-01 | End: 2020-12-06

## 2020-12-01 RX ORDER — NIACIN 1000 MG/1
1000 TABLET, EXTENDED RELEASE ORAL
Qty: 180 TAB | Refills: 3 | Status: SHIPPED | OUTPATIENT
Start: 2020-12-01 | End: 2020-12-06

## 2020-12-01 RX ORDER — ZOLPIDEM TARTRATE 6.25 MG/1
6.25 TABLET, FILM COATED, EXTENDED RELEASE ORAL
Qty: 30 TAB | Refills: 2 | Status: SHIPPED | OUTPATIENT
Start: 2020-12-01 | End: 2021-03-17

## 2020-12-01 RX ORDER — ALPRAZOLAM 0.5 MG/1
0.5 TABLET ORAL
Qty: 30 TAB | Refills: 1 | Status: SHIPPED | OUTPATIENT
Start: 2020-12-01 | End: 2021-06-08 | Stop reason: SDUPTHER

## 2020-12-01 RX ORDER — METOPROLOL SUCCINATE 25 MG/1
TABLET, EXTENDED RELEASE ORAL
Qty: 90 TAB | Refills: 3 | Status: SHIPPED | OUTPATIENT
Start: 2020-12-01 | End: 2020-12-06

## 2020-12-01 RX ORDER — POLYETHYLENE GLYCOL 3350 17 G/17G
17 POWDER, FOR SOLUTION ORAL DAILY
COMMUNITY

## 2020-12-01 NOTE — PROGRESS NOTES
Patient present for routine immunizations. Pt denies any symptoms , reactions or allergies that would exclude them from being immunized today. Risks and adverse reactions were discussed and the VIS was given to them. All questions were addressed. Pt was observed for 10 min post injection. There were no reactions observed. normal

## 2020-12-01 NOTE — PROGRESS NOTES
Consent: Franco Marques, who was seen by synchronous (real-time) audio-video technology, and/or his healthcare decision maker, is aware that this patient-initiated, Telehealth encounter on 12/1/2020 is a billable service, with coverage as determined by his insurance carrier. He is aware that he may receive a bill and has provided verbal consent to proceed: Yes. 712  Subjective:   Franco Marques is a 67 y.o. male who was seen for Labs (Nonfasting.)    He says that he is here for \"medication refill. \"  He appears somewhat dissatisfied to be here today. He is due for Pneumovax 23 vaccination. Today's appointment scheduled for the wellness visit but billed that service in April during his virtual visit. Complains of ongoing constipation. He has been taking MiraLAX with some improvement and asks if it was okay to continue it. He also added psyllium husk and it caused some increased bloating and some release of gelatinous stool. He had a colonoscopy in May 2019. Complains of ongoing left arm neuropathy. Reports this is caused by a carpal tunnel syndrome surgery in 1999 with resultant nerve block. He has been taking gabapentin 300 mg at night which did help a lot at first but now it is not helping as much. He does say he feels a little bit off balance in the morning and attributes this to gabapentin. He is reluctant to increase the dose but feels that he should try something else to see if it helps the pain a little bit more. Cough. Onset was in October 2019. Was diagnosed with possible bronchitis in December 2019. No treatments given. He has intermittent cough productive of clear to mildly discolored sputum. History of significant tobacco use 1 pack/day for 50 years. Quit smoking in 2012. He did have a lung CT done in May 2019 for cancer screening which was normal.  No previous history of COPD or asthma.     Hypertension  Hypertension ROS: taking medications as instructed, no medication side effects noted, no TIA's, no chest pain on exertion, no dyspnea on exertion, no swelling of ankles     reports that he quit smoking about 8 years ago. He has a 50.00 pack-year smoking history. He has never used smokeless tobacco.    reports current alcohol use of about 5.0 standard drinks of alcohol per week. BP Readings from Last 2 Encounters:   12/01/20 124/74   12/06/19 143/80     Hyperlipidemia  Currently he takes Crestor 10 mg  ROS: taking medications as instructed, no medication side effects noted  No new myalgias, no joint pains, no weakness  No TIA's, no chest pain on exertion, no dyspnea on exertion, no swelling of ankles. Lab Results   Component Value Date/Time    Cholesterol, total 104 04/28/2020 09:00 AM    HDL Cholesterol 40 04/28/2020 09:00 AM    LDL, calculated 51 04/28/2020 09:00 AM    VLDL, calculated 13 04/28/2020 09:00 AM    Triglyceride 67 04/28/2020 09:00 AM         Current Outpatient Medications   Medication Sig    polyethylene glycol (Miralax) 17 gram/dose powder Take 17 g by mouth daily.  gabapentin (NEURONTIN) 100 mg capsule Take 4 Caps by mouth nightly. Max Daily Amount: 400 mg. Increased dose 12/1/20  Indications: neuropathic pain    Crestor 10 mg tablet TAKE 1 TABLET NIGHTLY FOR  EXCESSIVE FAT IN THE BLOOD    metoprolol succinate (TOPROL-XL) 25 mg XL tablet TAKE 1 TABLET NIGHTLY    niacin (NIASPAN) 1,000 mg Tb24 tab Take 1 Tab by mouth nightly.  zolpidem CR (Ambien CR) 6.25 mg tablet Take 1 Tab by mouth nightly as needed for Sleep. Max Daily Amount: 6.25 mg.    ALPRAZolam (XANAX) 0.5 mg tablet Take 1 Tab by mouth nightly as needed for Anxiety. Max Daily Amount: 0.5 mg.    folic acid (FOLVITE) 1 mg tablet Take 1 Tab by mouth daily.  Aspirin, Buffered 81 mg tab Take 162 mg by mouth nightly.  omega-3 fatty acids-vitamin e (FISH OIL) 1,000 mg cap Take 1 Cap by mouth two (2) times a day.  ascorbic acid (VITAMIN C) 500 mg tablet Take 500 mg by mouth daily.      No current facility-administered medications for this visit. Allergies   Allergen Reactions    Rosuvastatin Myalgia     Generic rosuvastatin causes myalgias       Past Medical History:   Diagnosis Date    Benign essential HTN     BPH with obstruction/lower urinary tract symptoms     s/p TURP 2008 with worsenintg s/s    CAD (coronary artery disease) 1993    est Dr. Antwan Michael 4/2019.  s/p CABG, several stents    Carpal tunnel syndrome on left     s/p repair, caused L arm pains chronically    Chronic left-sided low back pain with left-sided sciatica     Dr. Radha Gilbert. s/p[ surgery 6/2018. MRI 5/2018 Densely enhancing intraspinal tumor as described from L4 to the first sacral    Cognitive changes 2019    Colon cancer ([facility] [address] 2001    colon - partial colectomy    Colon polyp     Coronary atherosclerosis of native coronary artery 11/22/2013    History of myocardial infarction     Hypercholesteremia     Insomnia     chronic.  sleep study 2014 normal. 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      [INSTRUCTIONS:  \"[x]\" Indicates a positive item  \"[]\" Indicates a negative item  -- DELETE ALL ITEMS NOT EXAMINED]    Constitutional: [x] Appears well-developed and well-nourished [x] No apparent distress      [] Abnormal -     Mental status: [x] Alert and awake  [x] Oriented to person/place/time [x] Able to follow commands    [] Abnormal -     Eyes:   EOM    [x]  Normal    [] Abnormal -   Sclera  [x]  Normal    [] Abnormal -          Discharge [x]  None visible   [] Abnormal -     HENT: [x] Normocephalic, atraumatic  [] Abnormal -   [x] Mouth/Throat: Mucous membranes are moist    External Ears [x] Normal  [] Abnormal -    Neck: [x] No visualized mass [] Abnormal -     Pulmonary/Chest: [x] Respiratory effort normal   [x] No visualized signs of difficulty breathing or respiratory distress        [] Abnormal -      Musculoskeletal:   [x] Normal gait with no signs of ataxia         [x] Normal range of motion of neck        [] Abnormal -     Neurological:        [x] No Facial Asymmetry (Cranial nerve 7 motor function) (limited exam due to video visit)          [x] No gaze palsy        [] Abnormal -          Skin:        [x] No significant exanthematous lesions or discoloration noted on facial skin         [] Abnormal -            Psychiatric:       [x] Normal Affect [] Abnormal -        [x] No Hallucinations    Other pertinent observable physical exam findings:-        Assessment & Plan:   Diagnoses and all orders for this visit:    1. Neuropathic pain, arm  Chronic significant pain. Keeps him awake at night. Will increase gabapentin to 400 g. He is reluctant to increase it further. Could try Lyrica as next option. Side effects discussed. -     gabapentin (NEURONTIN) 100 mg capsule; Take 4 Caps by mouth nightly. Max Daily Amount: 400 mg. Increased dose 12/1/20  Indications: neuropathic pain    2. Insomnia, unspecified type  Chronic intermittent insomnia. Takes zolpidem sparingly. Do not take with gabapentin. -     zolpidem CR (Ambien CR) 6.25 mg tablet; Take 1 Tab by mouth nightly as needed for Sleep. Max Daily Amount: 6.25 mg.    3. Anxiety  Chronic significant anxiety. Taking alprazolam sparingly. Do not take with zolpidem.  -     ALPRAZolam (XANAX) 0.5 mg tablet; Take 1 Tab by mouth nightly as needed for Anxiety. Max Daily Amount: 0.5 mg.    4. Constipation, chronic  May continue MiraLAX daily and try to supplement with smaller amounts of psyllium husk perhaps every other day. 5. Hypercholesteremia  Controlled on current regimen. 6. Benign essential HTN  Controlled on current regimen. Continue current medications as written in chart. 7. Chronic cough  8.  Hx Tobacco use  Chronic bronchitis type symptoms for over 1 year. This is a new diagnosis. Will check CT to rule out any obstructive lesions. Recommend pulmonary function test but patient is reluctant at this time since he does not want to go to the hospital would need to have COVID-19 testing. Can consider referral to pulmonary. Consider trial of a long-acting bronchodilator or ICS/LABA  -     CT CHEST WO CONT; Future    9. Other hyperlipidemia  -     Crestor 10 mg tablet; TAKE 1 TABLET NIGHTLY FOR  EXCESSIVE FAT IN THE BLOOD  -     niacin (NIASPAN) 1,000 mg Tb24 tab; Take 1 Tab by mouth nightly. 10. Atherosclerosis of native coronary artery of native heart without angina pectoris  Asymptomatic. He has chosen not to follow-up with cardiology due to COVID-19 at this point.  -     metoprolol succinate (TOPROL-XL) 25 mg XL tablet; TAKE 1 TABLET NIGHTLY  -     niacin (NIASPAN) 1,000 mg Tb24 tab; Take 1 Tab by mouth nightly. -     folic acid (FOLVITE) 1 mg tablet; Take 1 Tab by mouth daily. 11. Encounter for immunization  -     PNEUMOCOCCAL POLYSACCHARIDE VACCINE, 23-VALENT, ADULT OR IMMUNOSUPPRESSED PT DOSE,  -     ADMIN PNEUMOCOCCAL VACCINE          We discussed the expected course, resolution and complications of the diagnosis(es) in detail. Medication risks, benefits, costs, interactions, and alternatives were discussed as indicated. I advised him to contact the office if his condition worsens, changes or fails to improve as anticipated. He expressed understanding with the diagnosis(es) and plan. Case Echeverria is a 67 y.o. male who was evaluated by an audio-video encounter for concerns as above. Patient identification was verified prior to start of the visit. A caregiver was present when appropriate. Due to this being a TeleHealth encounter (During [facility] public health emergency), evaluation of the following organ systems was limited: Vitals/Constitutional/EENT/Resp/CV/GI//MS/Neuro/Skin/Heme-Lymph-Imm.   Pursuant to the emergency declaration under the [facility], [address] [place] [address] authority and the [employer] Act, this Virtual Visit was conducted, with patient's (and/or legal guardian's) consent, to reduce the patient's risk of exposure to COVID-19 and provide necessary medical care. Services were provided through a synchronous discussion virtually to substitute for in-person clinic visit. I was in the office. The patient was at home.      Ema Ingram MD

## 2020-12-05 DIAGNOSIS — E78.49 OTHER HYPERLIPIDEMIA: ICD-10-CM

## 2020-12-05 DIAGNOSIS — I25.10 ATHEROSCLEROSIS OF NATIVE CORONARY ARTERY OF NATIVE HEART WITHOUT ANGINA PECTORIS: ICD-10-CM

## 2020-12-06 RX ORDER — NIACIN 1000 MG/1
TABLET, EXTENDED RELEASE ORAL
Qty: 180 TAB | Refills: 1 | Status: SHIPPED | OUTPATIENT
Start: 2020-12-06 | End: 2021-06-08 | Stop reason: SDUPTHER

## 2020-12-06 RX ORDER — METOPROLOL SUCCINATE 25 MG/1
TABLET, EXTENDED RELEASE ORAL
Qty: 90 TAB | Refills: 1 | Status: SHIPPED | OUTPATIENT
Start: 2020-12-06 | End: 2021-04-22 | Stop reason: SDUPTHER

## 2020-12-06 RX ORDER — FOLIC ACID 1 MG/1
TABLET ORAL
Qty: 90 TAB | Refills: 1 | Status: SHIPPED | OUTPATIENT
Start: 2020-12-06 | End: 2021-06-08

## 2020-12-06 RX ORDER — ROSUVASTATIN CALCIUM 10 MG/1
TABLET, FILM COATED ORAL
Qty: 90 TAB | Refills: 1 | Status: SHIPPED | OUTPATIENT
Start: 2020-12-06 | End: 2021-06-08 | Stop reason: SDUPTHER

## 2020-12-14 ENCOUNTER — HOSPITAL ENCOUNTER (OUTPATIENT)
Dept: CT IMAGING | Age: 75
Discharge: HOME | End: 2020-12-14
Attending: INTERNAL MEDICINE
Payer: MEDICARE

## 2020-12-14 DIAGNOSIS — R05.3 CHRONIC COUGH: ICD-10-CM

## 2020-12-14 DIAGNOSIS — Z72.0 TOBACCO USE: ICD-10-CM

## 2020-12-14 PROCEDURE — 71250 CT THORAX DX C-: CPT

## 2020-12-19 PROBLEM — R91.8 PULMONARY NODULES: Status: ACTIVE | Noted: 2019-05-01

## 2021-01-08 ENCOUNTER — DOCUMENTATION ONLY (OUTPATIENT)
Dept: INTERNAL MEDICINE CLINIC | Age: 76
End: 2021-01-08

## 2021-01-28 ENCOUNTER — TELEPHONE (OUTPATIENT)
Dept: INTERNAL MEDICINE CLINIC | Age: 76
End: 2021-01-28

## 2021-01-28 ENCOUNTER — OFFICE VISIT (OUTPATIENT)
Dept: INTERNAL MEDICINE CLINIC | Age: 76
End: 2021-01-28
Payer: MEDICARE

## 2021-01-28 ENCOUNTER — PATIENT MESSAGE (OUTPATIENT)
Dept: INTERNAL MEDICINE CLINIC | Age: 76
End: 2021-01-28

## 2021-01-28 VITALS
DIASTOLIC BLOOD PRESSURE: 78 MMHG | HEART RATE: 60 BPM | HEIGHT: 69 IN | WEIGHT: 160 LBS | BODY MASS INDEX: 23.7 KG/M2 | TEMPERATURE: 99.3 F | RESPIRATION RATE: 16 BRPM | OXYGEN SATURATION: 98 % | SYSTOLIC BLOOD PRESSURE: 144 MMHG

## 2021-01-28 DIAGNOSIS — G47.00 INSOMNIA, UNSPECIFIED TYPE: ICD-10-CM

## 2021-01-28 DIAGNOSIS — M79.602 PAIN IN INFERIOR LEFT UPPER EXTREMITY: Primary | ICD-10-CM

## 2021-01-28 PROCEDURE — G8510 SCR DEP NEG, NO PLAN REQD: HCPCS | Performed by: INTERNAL MEDICINE

## 2021-01-28 PROCEDURE — G8753 SYS BP > OR = 140: HCPCS | Performed by: INTERNAL MEDICINE

## 2021-01-28 PROCEDURE — G8754 DIAS BP LESS 90: HCPCS | Performed by: INTERNAL MEDICINE

## 2021-01-28 PROCEDURE — G8420 CALC BMI NORM PARAMETERS: HCPCS | Performed by: INTERNAL MEDICINE

## 2021-01-28 PROCEDURE — 1101F PT FALLS ASSESS-DOCD LE1/YR: CPT | Performed by: INTERNAL MEDICINE

## 2021-01-28 PROCEDURE — G0463 HOSPITAL OUTPT CLINIC VISIT: HCPCS | Performed by: INTERNAL MEDICINE

## 2021-01-28 PROCEDURE — G8536 NO DOC ELDER MAL SCRN: HCPCS | Performed by: INTERNAL MEDICINE

## 2021-01-28 PROCEDURE — G8427 DOCREV CUR MEDS BY ELIG CLIN: HCPCS | Performed by: INTERNAL MEDICINE

## 2021-01-28 PROCEDURE — 99213 OFFICE O/P EST LOW 20 MIN: CPT | Performed by: INTERNAL MEDICINE

## 2021-01-28 NOTE — TELEPHONE ENCOUNTER
Patient saw Dr Jimmy Houston today and he requested change in PCP to Dr. Jimmy Houston. She has agreed to assume his care and I agree. He has expressed dissatisfaction with my care on several occasions and I do not seem to be a good fit for him. I have changed PCP in connect care and I will email to change patient empanelment in chart.

## 2021-01-28 NOTE — PROGRESS NOTES
Assessment and Plan   Diagnoses and all orders for this visit:    1. Pain in inferior left upper extremity  Received his first dose of his Covid vaccine yesterday. Reports that the injection was fairly high up on his shoulder and felt like the needle went to his bone. Since yesterday, he has had severe pain that has made it difficult to go to sleep and he cannot sleep on his arm. Reports difficulty moving his arm due to pain. No erythema or swelling noted. Symptoms likely secondary to injection. Advised that symptoms should improve over the next couple days. Can use Tylenol as needed for pain. Advised to call if symptoms do not improve    2. Insomnia, unspecified type  Was previously on Ambien 12.5 and was switched to 6.25 by another provider. He wants to go back to the 12.5. Reports that he does not sleep the whole night and does not feel well rested. Reports this has been a lifelong issue for him. Advised that I would talk to Dr. [facility]. Benefits, risks, possible drug interactions, and side effects of all new medications were reviewed with the patient. Pt verbalized understanding. Return to clinic: As needed    An electronic signature was used to authenticate this note. Tami Gottron, MD  [facility]  1/28/2021    No future appointments. Subjective   Chief Complaint   Arm pain    Aric Bautista is a 67 y.o. male           Objective   Vitals:       Visit Vitals  BP (!) 144/78   Pulse 60   Temp 99.3 °F (37.4 °C) (Oral)   Resp 16   Ht 5' 9\" (1.753 m)   Wt 160 lb (72.6 kg)   SpO2 98%   BMI 23.63 kg/m²        Physical Exam  Musculoskeletal:      Comments: Mild tenderness to palpation of anterior shoulder joint. No swelling or erythema.   Decreased range of motion due to pain          Current Outpatient Medications   Medication Sig    niacin (NIASPAN) 1,000 mg Tb24 tab TAKE 2 TABLETS NIGHTLY    folic acid (FOLVITE) 1 mg tablet TAKE 1 TABLET NIGHTLY    metoprolol succinate (TOPROL-XL) 25 mg XL tablet TAKE 1 TABLET NIGHTLY    Crestor 10 mg tablet TAKE 1 TABLET NIGHTLY    polyethylene glycol (Miralax) 17 gram/dose powder Take 17 g by mouth daily.  gabapentin (NEURONTIN) 100 mg capsule Take 4 Caps by mouth nightly. Max Daily Amount: 400 mg. Increased dose 12/1/20  Indications: neuropathic pain    zolpidem CR (Ambien CR) 6.25 mg tablet Take 1 Tab by mouth nightly as needed for Sleep. Max Daily Amount: 6.25 mg.    ALPRAZolam (XANAX) 0.5 mg tablet Take 1 Tab by mouth nightly as needed for Anxiety. Max Daily Amount: 0.5 mg.    Aspirin, Buffered 81 mg tab Take 162 mg by mouth nightly.  omega-3 fatty acids-vitamin e (FISH OIL) 1,000 mg cap Take 1 Cap by mouth two (2) times a day.  ascorbic acid (VITAMIN C) 500 mg tablet Take 500 mg by mouth daily. No current facility-administered medications for this visit.

## 2021-03-17 ENCOUNTER — OFFICE VISIT (OUTPATIENT)
Dept: INTERNAL MEDICINE CLINIC | Age: 76
End: 2021-03-17
Payer: MEDICARE

## 2021-03-17 VITALS
HEIGHT: 68 IN | SYSTOLIC BLOOD PRESSURE: 120 MMHG | WEIGHT: 151 LBS | TEMPERATURE: 98.5 F | HEART RATE: 56 BPM | BODY MASS INDEX: 22.88 KG/M2 | RESPIRATION RATE: 20 BRPM | OXYGEN SATURATION: 100 % | DIASTOLIC BLOOD PRESSURE: 64 MMHG

## 2021-03-17 DIAGNOSIS — Z72.0 TOBACCO USE: ICD-10-CM

## 2021-03-17 DIAGNOSIS — I25.10 CORONARY ARTERY DISEASE INVOLVING NATIVE HEART WITHOUT ANGINA PECTORIS, UNSPECIFIED VESSEL OR LESION TYPE: Primary | ICD-10-CM

## 2021-03-17 DIAGNOSIS — G47.00 INSOMNIA, UNSPECIFIED TYPE: ICD-10-CM

## 2021-03-17 DIAGNOSIS — F41.9 ANXIETY: ICD-10-CM

## 2021-03-17 DIAGNOSIS — R68.89 OTHER GENERAL SYMPTOMS AND SIGNS: ICD-10-CM

## 2021-03-17 DIAGNOSIS — Z11.59 NEED FOR HEPATITIS C SCREENING TEST: ICD-10-CM

## 2021-03-17 DIAGNOSIS — G56.02 CARPAL TUNNEL SYNDROME ON LEFT: ICD-10-CM

## 2021-03-17 DIAGNOSIS — K59.00 CONSTIPATION, UNSPECIFIED CONSTIPATION TYPE: ICD-10-CM

## 2021-03-17 DIAGNOSIS — D69.6 THROMBOCYTOPENIA (HCC): ICD-10-CM

## 2021-03-17 DIAGNOSIS — R79.9 ABNORMAL FINDING OF BLOOD CHEMISTRY, UNSPECIFIED: ICD-10-CM

## 2021-03-17 DIAGNOSIS — Z85.038 HISTORY OF COLON CANCER: ICD-10-CM

## 2021-03-17 DIAGNOSIS — G62.9 NEUROPATHY: ICD-10-CM

## 2021-03-17 DIAGNOSIS — E78.00 HYPERCHOLESTEREMIA: ICD-10-CM

## 2021-03-17 DIAGNOSIS — N40.1 BENIGN PROSTATIC HYPERPLASIA WITH LOWER URINARY TRACT SYMPTOMS, SYMPTOM DETAILS UNSPECIFIED: ICD-10-CM

## 2021-03-17 PROCEDURE — G8420 CALC BMI NORM PARAMETERS: HCPCS | Performed by: INTERNAL MEDICINE

## 2021-03-17 PROCEDURE — 1101F PT FALLS ASSESS-DOCD LE1/YR: CPT | Performed by: INTERNAL MEDICINE

## 2021-03-17 PROCEDURE — G8427 DOCREV CUR MEDS BY ELIG CLIN: HCPCS | Performed by: INTERNAL MEDICINE

## 2021-03-17 PROCEDURE — G8752 SYS BP LESS 140: HCPCS | Performed by: INTERNAL MEDICINE

## 2021-03-17 PROCEDURE — G8536 NO DOC ELDER MAL SCRN: HCPCS | Performed by: INTERNAL MEDICINE

## 2021-03-17 PROCEDURE — G8510 SCR DEP NEG, NO PLAN REQD: HCPCS | Performed by: INTERNAL MEDICINE

## 2021-03-17 PROCEDURE — 99215 OFFICE O/P EST HI 40 MIN: CPT | Performed by: INTERNAL MEDICINE

## 2021-03-17 PROCEDURE — G8754 DIAS BP LESS 90: HCPCS | Performed by: INTERNAL MEDICINE

## 2021-03-17 PROCEDURE — G0463 HOSPITAL OUTPT CLINIC VISIT: HCPCS | Performed by: INTERNAL MEDICINE

## 2021-03-17 RX ORDER — GABAPENTIN 100 MG/1
CAPSULE ORAL
COMMUNITY
End: 2021-04-05

## 2021-03-17 RX ORDER — ZOLPIDEM TARTRATE 12.5 MG/1
12.5 TABLET, FILM COATED, EXTENDED RELEASE ORAL
Qty: 90 TAB | Refills: 3 | Status: SHIPPED | OUTPATIENT
Start: 2021-03-17 | End: 2021-06-08

## 2021-03-17 NOTE — PROGRESS NOTES
Assessment and Plan   Diagnoses and all orders for this visit:    Hx CAD s/p CABG, colon cancer 2001 s/p hemicolectomy, thrombocytopenia, HTN, HLD, symptomatic BPH, pulmonary nodule (stable 2020), neuropathy, insomnia    1. Coronary artery disease involving native heart without angina pectoris, unspecified vessel or lesion type  -     HEMOGLOBIN A1C WITH EAG; Future  -     METABOLIC PANEL, COMPREHENSIVE; Future  -     LIPID PANEL; Future  Hyperlipidemia  Status post multiple MIs, stents and eventually CABG. Denies any current chest pain. On metoprolol 20 5 at night, statin, aspirin. Also uses niacin 2000 and fish oil over-the-counter. Followed by cardiology (he cannot remember who). No medication changes recommended    2. Need for hepatitis C screening test  -     HEPATITIS C AB; Future    3. Thrombocytopenia (HCC)  -     CBC WITH AUTOMATED DIFF; Future  -     PERIPHERAL SMEAR; Future  -     HIV 1/2 AG/AB, 4TH GENERATION,W RFLX CONFIRM; Future  -     VITAMIN B12 & FOLATE; Future  -     TSH 3RD GENERATION; Future  -     T4, FREE; Future  -     SPEP AND TAMIKO, SERUM; Future  -     PROTEIN ELECTROPHORESIS, URINE RANDOM; Future  Has had thrombocytopenia at least since 2013. Initial test in 11/2013 (140) was in the periop period for his CABG. His 6/2015 reading (89) was an ER visit when he had a febrile illness. His 3/2019 labs (134) were during routine labs. 4/2020 labs were also routine (124)    ITP? I do not see a previous thrombocytopenia work-up so we will get it to rule out other causes. 4. Insomnia, unspecified type  -     zolpidem CR (AMBIEN CR) 12.5 mg tablet; Take 1 Tab by mouth nightly as needed for Sleep. Max Daily Amount: 12.5 mg. Was previously changed to lower dose of Ambien. This is not working well for him. We will increase back to Ambien 12-1/2 which worked well for him in the past.  Simon Alfaro not to take at the same time as Xanax    5.  Abnormal finding of blood chemistry, unspecified   - HEMOGLOBIN A1C WITH EAG; Future    6. Other general symptoms and signs   -     TSH 3RD GENERATION; Future  -     T4, FREE; Future    7. History of colon cancer  2001, cannot remember stage, underwent 2 rounds of radiation but did not tolerate well. No chemo. No issues since then and is currently on every 5-year surveillance    9. Carpal tunnel syndrome on left  Had surgery but still uses a brace    10. Anxiety  Uses Xanax 0.5 mg as needed. Last filled 30 pills 1/3/2021. Still has 10 pills left. Advised not to take at the same time as Ambien. Okay to continue. No medication changes recommended. 11. Neuropathy  Working well for him. Continue gabapentin 100 in the morning, 100 in the afternoon, 200 in the evening. 12. Constipation, unspecified constipation type  Chronic issue for him. Was using MiraLAX but did not find that helpful. Not currently using Metamucil    13. Benign prostatic hyperplasia with lower urinary tract symptoms, symptom details unspecified  Underwent TURP with no improvement of symptoms. Has already been seen by urology    14. Tobacco use  Discuss readiness for cessation at another visit    Benefits, risks, possible drug interactions, and side effects of all new medications were reviewed with the patient. Pt verbalized understanding. Return to clinic: 2 weeks for lab follow-up, address other concerns    An electronic signature was used to authenticate this note. Rani Zurita MD  [facility]  3/17/2021    Future Appointments   Date Time Provider Prakash Mckinley   1/01/6151  5:48 AM Chapincito Mcginnis MD [facility] BS AMB      On this date 03/17/2021 I have spent 62 minutes reviewing previous notes, test results and face to face with the patient discussing the diagnosis and importance of compliance with the treatment plan as well as documenting on the day of the visit.     History of Present Illness   Chief Complaint   Establish care    Licha Gallegos is a 67 y.o. male         Review of Systems   Constitutional: Negative for chills and fever. HENT: Negative for hearing loss. Eyes: Negative for blurred vision. Respiratory: Negative for shortness of breath. Cardiovascular: Negative for chest pain. Gastrointestinal: Positive for constipation. Negative for abdominal pain, blood in stool, diarrhea, melena, nausea and vomiting. Genitourinary: Negative for dysuria and hematuria. Musculoskeletal: Negative for joint pain. Skin: Negative for rash. Neurological: Negative for headaches. Past Medical History     Allergies   Allergen Reactions    Rosuvastatin Myalgia     Generic rosuvastatin causes myalgias        Current Outpatient Medications   Medication Sig    folic acid/multivit-min/lutein (CENTRUM SILVER PO) Take  by mouth.  zolpidem CR (AMBIEN CR) 12.5 mg tablet Take 1 Tab by mouth nightly as needed for Sleep. Max Daily Amount: 12.5 mg.    gabapentin (NEURONTIN) 100 mg capsule Take  by mouth. 100mg am, 100mg noon, 200mg pm    niacin (NIASPAN) 1,000 mg Tb24 tab TAKE 2 TABLETS NIGHTLY    folic acid (FOLVITE) 1 mg tablet TAKE 1 TABLET NIGHTLY    metoprolol succinate (TOPROL-XL) 25 mg XL tablet TAKE 1 TABLET NIGHTLY    Crestor 10 mg tablet TAKE 1 TABLET NIGHTLY    polyethylene glycol (Miralax) 17 gram/dose powder Take 17 g by mouth daily.  ALPRAZolam (XANAX) 0.5 mg tablet Take 1 Tab by mouth nightly as needed for Anxiety. Max Daily Amount: 0.5 mg.    Aspirin, Buffered 81 mg tab Take 81 mg by mouth nightly.  omega-3 fatty acids-vitamin e (FISH OIL) 1,000 mg cap Take 1 Cap by mouth two (2) times a day.  ascorbic acid (VITAMIN C) 500 mg tablet Take 500 mg by mouth daily. No current facility-administered medications for this visit.            Patient Active Problem List   Diagnosis Code    History of colon cancer Z85.038    Thrombocytopenia (HCC) D69.6    Adenomatous polyp of transverse colon D12.3    Other insomnia G47.09    Coronary artery disease involving native coronary artery of native heart without angina pectoris I25.10    Essential hypertension I10    History of myocardial infarction I25.2    Hypercholesteremia E78.00    Tachycardia R00.0    BPH with obstruction/lower urinary tract symptoms N40.1, N13.8    Cognitive changes R41.89    Pulmonary nodules R91.8    History of benign spinal cord tumor Z86.018    Carpal tunnel syndrome on left G56.02    Hx of CABG Z95.1    Anxiety F41.9    Neuropathy G62.9    Constipation, unspecified constipation type K59.00    Tobacco use Z72.0     Past Surgical History:   Procedure Laterality Date    COLONOSCOPY N/A 2019    COLON[facility] by [facility]b [facility]    caused chronic pain L arm from nerve block    HX CATARACT REMOVAL Bilateral     HX CORONARY ARTERY BYPASS GRAFT      cabg x 3    HX HEART CATHETERIZATION      multiple stents    HX HERNIA REPAIR      bilateral inguinal    HX LUMBAR DISKECTOMY  2018    Dr Vitaly Wallis.  s/p spinal tumor resection    HX TONSILLECTOMY      HX TOTAL COLECTOMY      partial    HX TURP        Social History     Tobacco Use    Smoking status: Former Smoker     Packs/day: 1.00     Years: 50.00     Pack years: 50.00     Quit date: 2012     Years since quittin.8    Smokeless tobacco: Never Used   Substance Use Topics    Alcohol use: Yes     Comment: usually 3 drinks week, but not as much with covid      Family History   Problem Relation Age of Onset    Heart Disease Father     Diabetes Mother     Heart Disease Paternal Grandfather     Cancer Maternal Grandmother         pancreatic        Physical Exam   Vitals:       Visit Vitals  /64   Pulse (!) 56   Temp 98.5 °F (36.9 °C) (Oral)   Resp 20   Ht 5' 8\" (1.727 m)   Wt 151 lb (68.5 kg)   SpO2 100%   BMI 22.96 kg/m²        Physical Exam  Constitutional:       General: He is not in acute distress. Appearance: He is well-developed. HENT:      Right Ear: Tympanic membrane, ear canal and external ear normal.      Left Ear: Tympanic membrane, ear canal and external ear normal.   Eyes:      Extraocular Movements: Extraocular movements intact. Conjunctiva/sclera: Conjunctivae normal.   Neck:      Musculoskeletal: Neck supple. Cardiovascular:      Rate and Rhythm: Normal rate and regular rhythm. Pulses: Normal pulses. Heart sounds: No murmur. No friction rub. No gallop. Pulmonary:      Effort: No respiratory distress. Breath sounds: No wheezing, rhonchi or rales. Abdominal:      General: Bowel sounds are normal. There is no distension. Palpations: Abdomen is soft. There is no hepatomegaly, splenomegaly or mass. Tenderness: There is no abdominal tenderness. There is no guarding. Skin:     General: Skin is warm. Findings: No rash. Neurological:      Mental Status: He is alert.

## 2021-03-22 LAB
ALBUMIN MFR UR ELPH: 53.6 %
ALBUMIN SERPL-MCNC: 4.4 G/DL (ref 3.5–5)
ALBUMIN/GLOB SERPL: 1.6 {RATIO} (ref 1.1–2.2)
ALP SERPL-CCNC: 77 U/L (ref 45–117)
ALPHA1 GLOB MFR UR ELPH: 0.5 %
ALPHA2 GLOB 24H MFR UR ELPH: 11.8 %
ALT SERPL-CCNC: 29 U/L (ref 12–78)
ANION GAP SERPL CALC-SCNC: 5 MMOL/L (ref 5–15)
AST SERPL-CCNC: 20 U/L (ref 15–37)
B-GLOBULIN 24H MFR UR ELPH: 27 %
BASOPHILS # BLD: 0.1 K/UL (ref 0–0.1)
BASOPHILS NFR BLD: 1 % (ref 0–1)
BILIRUB SERPL-MCNC: 0.8 MG/DL (ref 0.2–1)
BUN SERPL-MCNC: 14 MG/DL (ref 6–20)
BUN/CREAT SERPL: 14 (ref 12–20)
CALCIUM SERPL-MCNC: 9.6 MG/DL (ref 8.5–10.1)
CHLORIDE SERPL-SCNC: 106 MMOL/L (ref 97–108)
CHOLEST SERPL-MCNC: 111 MG/DL
CO2 SERPL-SCNC: 30 MMOL/L (ref 21–32)
CREAT SERPL-MCNC: 1.03 MG/DL (ref 0.7–1.3)
DIFFERENTIAL METHOD BLD: ABNORMAL
EOSINOPHIL # BLD: 0.2 K/UL (ref 0–0.4)
EOSINOPHIL NFR BLD: 4 % (ref 0–7)
ERYTHROCYTE [DISTWIDTH] IN BLOOD BY AUTOMATED COUNT: 13 % (ref 11.5–14.5)
EST. AVERAGE GLUCOSE BLD GHB EST-MCNC: 114 MG/DL
FOLATE SERPL-MCNC: 63.4 NG/ML (ref 5–21)
GAMMA GLOB 24H MFR UR ELPH: 7 %
GLOBULIN SER CALC-MCNC: 2.8 G/DL (ref 2–4)
GLUCOSE SERPL-MCNC: 87 MG/DL (ref 65–100)
HBA1C MFR BLD: 5.6 % (ref 4–5.6)
HCT VFR BLD AUTO: 43.6 % (ref 36.6–50.3)
HCV AB SERPL QL IA: NONREACTIVE
HCV COMMENT,HCGAC: NORMAL
HDLC SERPL-MCNC: 43 MG/DL
HDLC SERPL: 2.6 {RATIO} (ref 0–5)
HGB BLD-MCNC: 14.2 G/DL (ref 12.1–17)
HIV 1+2 AB+HIV1 P24 AG SERPL QL IA: NONREACTIVE
HIV12 RESULT COMMENT, HHIVC: NORMAL
IMM GRANULOCYTES # BLD AUTO: 0 K/UL (ref 0–0.04)
IMM GRANULOCYTES NFR BLD AUTO: 0 % (ref 0–0.5)
LDLC SERPL CALC-MCNC: 49.6 MG/DL (ref 0–100)
LIPID PROFILE,FLP: NORMAL
LYMPHOCYTES # BLD: 1.3 K/UL (ref 0.8–3.5)
LYMPHOCYTES NFR BLD: 30 % (ref 12–49)
M PROTEIN MFR UR ELPH: NORMAL %
MCH RBC QN AUTO: 33.2 PG (ref 26–34)
MCHC RBC AUTO-ENTMCNC: 32.6 G/DL (ref 30–36.5)
MCV RBC AUTO: 101.9 FL (ref 80–99)
MONOCYTES # BLD: 0.7 K/UL (ref 0–1)
MONOCYTES NFR BLD: 15 % (ref 5–13)
NEUTS SEG # BLD: 2.1 K/UL (ref 1.8–8)
NEUTS SEG NFR BLD: 50 % (ref 32–75)
NRBC # BLD: 0 K/UL (ref 0–0.01)
NRBC BLD-RTO: 0 PER 100 WBC
PERIPHERAL SMEAR,PSM: NORMAL
PLATELET # BLD AUTO: 110 K/UL (ref 150–400)
PLEASE NOTE:, 133800: NORMAL
PMV BLD AUTO: 12.7 FL (ref 8.9–12.9)
POTASSIUM SERPL-SCNC: 4.2 MMOL/L (ref 3.5–5.1)
PROT SERPL-MCNC: 7.2 G/DL (ref 6.4–8.2)
PROT UR-MCNC: 8.5 MG/DL
RBC # BLD AUTO: 4.28 M/UL (ref 4.1–5.7)
SODIUM SERPL-SCNC: 141 MMOL/L (ref 136–145)
T4 FREE SERPL-MCNC: 1.1 NG/DL (ref 0.8–1.5)
TRIGL SERPL-MCNC: 92 MG/DL (ref ?–150)
TSH SERPL DL<=0.05 MIU/L-ACNC: 2.14 UIU/ML (ref 0.36–3.74)
VIT B12 SERPL-MCNC: 1604 PG/ML (ref 193–986)
VLDLC SERPL CALC-MCNC: 18.4 MG/DL
WBC # BLD AUTO: 4.4 K/UL (ref 4.1–11.1)

## 2021-03-23 LAB
ALBUMIN SERPL ELPH-MCNC: NORMAL G/DL
ALBUMIN/GLOB SERPL: NORMAL {RATIO}
ALPHA1 GLOB SERPL ELPH-MCNC: NORMAL G/DL
ALPHA2 GLOB SERPL ELPH-MCNC: NORMAL G/DL
B-GLOBULIN SERPL ELPH-MCNC: NORMAL G/DL
GAMMA GLOB SERPL ELPH-MCNC: NORMAL G/DL
GLOBULIN SER-MCNC: NORMAL G/DL
IGA SERPL-MCNC: NORMAL MG/DL
IGG SERPL-MCNC: NORMAL MG/DL
IGM SERPL-MCNC: NORMAL MG/DL
INTERPRETATION SERPL IEP-IMP: NORMAL
M PROTEIN SERPL ELPH-MCNC: NORMAL G/DL
PROT SERPL-MCNC: 7.1 G/DL (ref 6–8.5)

## 2021-03-24 ENCOUNTER — TELEPHONE (OUTPATIENT)
Dept: INTERNAL MEDICINE CLINIC | Age: 76
End: 2021-03-24

## 2021-03-24 DIAGNOSIS — D69.6 THROMBOCYTOPENIA (HCC): Primary | ICD-10-CM

## 2021-03-24 DIAGNOSIS — D69.1 THROMBOCYTOPATHIA (HCC): Primary | ICD-10-CM

## 2021-03-24 NOTE — TELEPHONE ENCOUNTER
Incoming call from [facility] to inform us that the serum protein electrophoresis could not be ran d/t not enough blood. Patient will need to have lab redrawn. Labs reordered.  will contact patient as well for recollection. Patient has appt 3/31/21 would you like labs drawn then or before appointment?

## 2021-03-24 NOTE — PROGRESS NOTES
[employer] message sent. workup for thrombocytopenia. Thyroid studies normal.  B12 and folate above normal limits. HIV negative. Hep C negative. LDL 49. CMP normal.  A1c normal.  .9, which is slightly higher than before. Platelets 995. UPEP negative. Smear unrevealing. SPEP not enough sample    stop folic acid   heme on referral.  ITP?  ==  Your test results are normal except for the following: Your platelets are still low. The rest of your labs are normal.  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 Usually, we do not need to treat this. We just monitor. However, I would like for you to see a hematologist to confirm the diagnosis and ensure no other further work-up is needed. One of the test we got did not have enough blood in it to test, so they may repeat this test at your visit. Please call their office to schedule an appointment:  Xochilt Arias  [address] [place] [address]  [phone]    Your folic acid level is sufficiently high, so I recommend stopping your folic acid supplement.

## 2021-03-30 NOTE — PROGRESS NOTES
Assessment and Plan   Diagnoses and all orders for this visit:    Hx CAD s/p CABG, colon cancer 2001 s/p hemicolectomy, thrombocytopenia, HTN, HLD, symptomatic BPH, pulmonary nodule (stable 2020), neuropathy, insomnia      1. Insomnia, unspecified type  -     eszopiclone (LUNESTA) 2 mg tablet; Take 1 Tab by mouth nightly. Max Daily Amount: 2 mg. Previous medications: Trazodone, melatonin, Ambien    Increased his Ambien to 12.5 last visit. Still waking up at 4305:00 in the morning and feels like he is not getting enough sleep at night. He goes to bed around 10 3011. Says that the Ambien used to work better but has noticed that it is not working as well. Recommend stopping Ambien and starting Lunesta. Advised to send us a message in 10 days let me know how its going    2. Coronary artery disease involving native heart without angina pectoris, unspecified vessel or lesion type  -     REFERRAL TO CARDIOLOGY  LDL 49. At goal.  On aspirin, Crestor, metoprolol. He is requesting referral to a cardiologist within our system. Do not recommend medication changes today. 3. Peripheral vascular disease, unspecified ([address]  Denies any claudication. On aspirin, statin. No medication change recommended    4. Dry skin  Has noticed some itching on his upper chest and sides. No obvious rash noted. Likely from dry skin. Recommend thick emollients and not using hot showers    5. Thrombocytopenia ([facility] [address]  From last visit:  \"Has had thrombocytopenia at least since 2013. Initial test in 11/2013 (140) was in the periop period for his CABG. His 6/2015 reading (89) was an ER visit when he had a febrile illness. His 3/2019 labs (134) were during routine labs. 4/2020 labs were also routine (124)\"    Platelets 373 last visit. UPEP negative. Smear unrevealing. SPEP was unable to be obtained due to not enough sample. Thyroid studies normal.  B12 and folate above normal limits. HIV and hep C negative.     I think this is most likely ITP but will refer to hematology for confirmation and further work-up if needed. Benefits, risks, possible drug interactions, and side effects of all new medications were reviewed with the patient. Pt verbalized understanding. Return to clinic: 6 months or earlier if needed    An electronic signature was used to authenticate this note. Luis Dobbs MD  [facility]  3/31/2021    No future appointments. Subjective   Chief Complaint   Sleep    Jen Means is a 67 y.o. male           Objective   Vitals:       Visit Vitals  /85   Pulse (!) 58   Temp 98.2 °F (36.8 °C)   Ht 5' 8\" (1.727 m)   Wt 151 lb 3.2 oz (68.6 kg)   SpO2 98%   BMI 22.99 kg/m²        Physical Exam  Constitutional:       Appearance: Normal appearance. He is not ill-appearing. Cardiovascular:      Rate and Rhythm: Normal rate. Pulmonary:      Effort: No respiratory distress. Neurological:      Mental Status: He is alert. Current Outpatient Medications   Medication Sig    eszopiclone (LUNESTA) 2 mg tablet Take 1 Tab by mouth nightly. Max Daily Amount: 2 mg.  folic acid/multivit-min/lutein (CENTRUM SILVER PO) Take  by mouth.  zolpidem CR (AMBIEN CR) 12.5 mg tablet Take 1 Tab by mouth nightly as needed for Sleep. Max Daily Amount: 12.5 mg.    gabapentin (NEURONTIN) 100 mg capsule Take  by mouth. 100mg am, 100mg noon, 200mg pm    niacin (NIASPAN) 1,000 mg Tb24 tab TAKE 2 TABLETS NIGHTLY    folic acid (FOLVITE) 1 mg tablet TAKE 1 TABLET NIGHTLY    metoprolol succinate (TOPROL-XL) 25 mg XL tablet TAKE 1 TABLET NIGHTLY    Crestor 10 mg tablet TAKE 1 TABLET NIGHTLY    polyethylene glycol (Miralax) 17 gram/dose powder Take 17 g by mouth daily.  ALPRAZolam (XANAX) 0.5 mg tablet Take 1 Tab by mouth nightly as needed for Anxiety. Max Daily Amount: 0.5 mg.    Aspirin, Buffered 81 mg tab Take 81 mg by mouth nightly.     omega-3 fatty acids-vitamin e (FISH OIL) 1,000 mg cap Take 1 Cap by mouth two (2) times a day.  ascorbic acid (VITAMIN C) 500 mg tablet Take 500 mg by mouth daily. No current facility-administered medications for this visit.

## 2021-03-31 ENCOUNTER — OFFICE VISIT (OUTPATIENT)
Dept: INTERNAL MEDICINE CLINIC | Age: 76
End: 2021-03-31
Payer: MEDICARE

## 2021-03-31 VITALS
HEIGHT: 68 IN | TEMPERATURE: 98.2 F | DIASTOLIC BLOOD PRESSURE: 85 MMHG | WEIGHT: 151.2 LBS | SYSTOLIC BLOOD PRESSURE: 138 MMHG | OXYGEN SATURATION: 98 % | HEART RATE: 58 BPM | BODY MASS INDEX: 22.91 KG/M2

## 2021-03-31 DIAGNOSIS — I25.10 CORONARY ARTERY DISEASE INVOLVING NATIVE HEART WITHOUT ANGINA PECTORIS, UNSPECIFIED VESSEL OR LESION TYPE: ICD-10-CM

## 2021-03-31 DIAGNOSIS — I73.9 PERIPHERAL VASCULAR DISEASE, UNSPECIFIED (HCC): ICD-10-CM

## 2021-03-31 DIAGNOSIS — G47.00 INSOMNIA, UNSPECIFIED TYPE: Primary | ICD-10-CM

## 2021-03-31 DIAGNOSIS — L85.3 DRY SKIN: ICD-10-CM

## 2021-03-31 DIAGNOSIS — D69.6 THROMBOCYTOPENIA (HCC): ICD-10-CM

## 2021-03-31 PROCEDURE — G8754 DIAS BP LESS 90: HCPCS | Performed by: INTERNAL MEDICINE

## 2021-03-31 PROCEDURE — G0463 HOSPITAL OUTPT CLINIC VISIT: HCPCS | Performed by: INTERNAL MEDICINE

## 2021-03-31 PROCEDURE — 99214 OFFICE O/P EST MOD 30 MIN: CPT | Performed by: INTERNAL MEDICINE

## 2021-03-31 PROCEDURE — G8420 CALC BMI NORM PARAMETERS: HCPCS | Performed by: INTERNAL MEDICINE

## 2021-03-31 PROCEDURE — G8536 NO DOC ELDER MAL SCRN: HCPCS | Performed by: INTERNAL MEDICINE

## 2021-03-31 PROCEDURE — 1101F PT FALLS ASSESS-DOCD LE1/YR: CPT | Performed by: INTERNAL MEDICINE

## 2021-03-31 PROCEDURE — G8427 DOCREV CUR MEDS BY ELIG CLIN: HCPCS | Performed by: INTERNAL MEDICINE

## 2021-03-31 PROCEDURE — G8432 DEP SCR NOT DOC, RNG: HCPCS | Performed by: INTERNAL MEDICINE

## 2021-03-31 PROCEDURE — G8752 SYS BP LESS 140: HCPCS | Performed by: INTERNAL MEDICINE

## 2021-03-31 RX ORDER — ESZOPICLONE 2 MG/1
2 TABLET, FILM COATED ORAL
Qty: 30 TAB | Refills: 1 | Status: SHIPPED | OUTPATIENT
Start: 2021-03-31 | End: 2021-06-08

## 2021-03-31 NOTE — PATIENT INSTRUCTIONS
Stop folic acid Make an appointment with the hematologist for your low platelets Stop ambien, start lunesta - send me a message in 10 days to let me know how it's going

## 2021-04-02 DIAGNOSIS — Z85.038 HISTORY OF COLON CANCER: Primary | ICD-10-CM

## 2021-04-03 DIAGNOSIS — G62.9 NEUROPATHY: Primary | ICD-10-CM

## 2021-04-05 RX ORDER — GABAPENTIN 100 MG/1
CAPSULE ORAL
Qty: 120 CAP | Refills: 2 | Status: SHIPPED | OUTPATIENT
Start: 2021-04-05 | End: 2021-06-08 | Stop reason: SDUPTHER

## 2021-04-22 ENCOUNTER — OFFICE VISIT (OUTPATIENT)
Dept: CARDIOLOGY CLINIC | Age: 76
End: 2021-04-22
Payer: MEDICARE

## 2021-04-22 VITALS
DIASTOLIC BLOOD PRESSURE: 81 MMHG | BODY MASS INDEX: 23.95 KG/M2 | HEIGHT: 68 IN | OXYGEN SATURATION: 98 % | SYSTOLIC BLOOD PRESSURE: 130 MMHG | HEART RATE: 48 BPM | WEIGHT: 158 LBS

## 2021-04-22 DIAGNOSIS — I25.10 ATHEROSCLEROSIS OF NATIVE CORONARY ARTERY OF NATIVE HEART WITHOUT ANGINA PECTORIS: ICD-10-CM

## 2021-04-22 DIAGNOSIS — E78.00 HYPERCHOLESTEREMIA: ICD-10-CM

## 2021-04-22 DIAGNOSIS — I25.10 CORONARY ARTERY DISEASE INVOLVING NATIVE CORONARY ARTERY OF NATIVE HEART WITHOUT ANGINA PECTORIS: ICD-10-CM

## 2021-04-22 DIAGNOSIS — I10 ESSENTIAL HYPERTENSION: Primary | ICD-10-CM

## 2021-04-22 DIAGNOSIS — E78.49 OTHER HYPERLIPIDEMIA: ICD-10-CM

## 2021-04-22 PROCEDURE — G8754 DIAS BP LESS 90: HCPCS | Performed by: INTERNAL MEDICINE

## 2021-04-22 PROCEDURE — G0463 HOSPITAL OUTPT CLINIC VISIT: HCPCS | Performed by: INTERNAL MEDICINE

## 2021-04-22 PROCEDURE — G8752 SYS BP LESS 140: HCPCS | Performed by: INTERNAL MEDICINE

## 2021-04-22 PROCEDURE — 1101F PT FALLS ASSESS-DOCD LE1/YR: CPT | Performed by: INTERNAL MEDICINE

## 2021-04-22 PROCEDURE — G8420 CALC BMI NORM PARAMETERS: HCPCS | Performed by: INTERNAL MEDICINE

## 2021-04-22 PROCEDURE — G8428 CUR MEDS NOT DOCUMENT: HCPCS | Performed by: INTERNAL MEDICINE

## 2021-04-22 PROCEDURE — 99214 OFFICE O/P EST MOD 30 MIN: CPT | Performed by: INTERNAL MEDICINE

## 2021-04-22 PROCEDURE — G8536 NO DOC ELDER MAL SCRN: HCPCS | Performed by: INTERNAL MEDICINE

## 2021-04-22 PROCEDURE — 93010 ELECTROCARDIOGRAM REPORT: CPT | Performed by: INTERNAL MEDICINE

## 2021-04-22 PROCEDURE — G8432 DEP SCR NOT DOC, RNG: HCPCS | Performed by: INTERNAL MEDICINE

## 2021-04-22 PROCEDURE — 93005 ELECTROCARDIOGRAM TRACING: CPT | Performed by: INTERNAL MEDICINE

## 2021-04-22 RX ORDER — LISINOPRIL 5 MG/1
5 TABLET ORAL DAILY
Qty: 90 TAB | Refills: 3 | Status: SHIPPED | OUTPATIENT
Start: 2021-04-22 | End: 2021-06-08 | Stop reason: SDUPTHER

## 2021-04-22 RX ORDER — METOPROLOL SUCCINATE 25 MG/1
TABLET, EXTENDED RELEASE ORAL
Qty: 45 TAB | Refills: 3 | Status: SHIPPED | OUTPATIENT
Start: 2021-04-22 | End: 2021-06-08 | Stop reason: SDUPTHER

## 2021-04-22 NOTE — PROGRESS NOTES
All orders entered per VO of Dr. Haily Valdovinos:      Change Toprol XL to HALF tablet of 25mg po daily. Start Lisinopril 5mg po daily. Send to [facility]. See Dr. Haily Valdovinos in 1 year. Per VO of Dr. Duane Coin: 10/14/2020    Future Appointments   Date Time Provider Prakash Arlen   5/28/2021 11:00 AM Mag Winston MD [facility] BS AMB   84/6/5167  5:26 AM Jayme Mendoza MD [facility] BS AMB   4/28/2022  8:40 AM Rathi, Tino Eisenmenger, MD [facility] BS AMB       Requested Prescriptions     Pending Prescriptions Disp Refills    metoprolol succinate (TOPROL-XL) 25 mg XL tablet 45 Tab 3     Sig: TAKE 1/2 TABLET BY MOUTH DAILY    lisinopriL (PRINIVIL, ZESTRIL) 5 mg tablet 90 Tab 3     Sig: Take 1 Tab by mouth daily.

## 2021-04-22 NOTE — PATIENT INSTRUCTIONS
Change Toprol XL to HALF tablet of 25mg po daily. Start Lisinopril 5mg po daily. Send to [facility]. See Dr. Adair Mccollum in 1 year.

## 2021-04-22 NOTE — PROGRESS NOTES
Chief Complaint   Patient presents with    Hypertension    Cholesterol Problem    Other     AS     Visit Vitals  /81 (BP 1 Location: Right upper arm, BP Patient Position: Sitting)   Pulse (!) 48   Ht 5' 8\" (1.727 m)   Wt 158 lb (71.7 kg)   SpO2 98%   BMI 24.02 kg/m²     Chest pain denied   Palpations denied  SOB denied  Dizziness denied  Swelling in hands/feet denied   Recent hospital stays denied     Can he stop folic acid?

## 2021-04-22 NOTE — PROGRESS NOTES
Office Follow-up    NAME: Nessa Marin   :  1945  MRM:  [id number]    Date:  2021            Assessment:     Problem List  Date Reviewed: 3/31/2021          Codes Class Noted    Carpal tunnel syndrome on left ICD-10-CM: G56.02  ICD-9-CM: 354.0  Unknown    Overview Signed 3/17/2021  8:69 PM by Iain Julian MD     s/p repair             Hx of CABG ICD-10-CM: Z95.1  ICD-9-CM: V45.81  Unknown        Anxiety ICD-10-CM: F41.9  ICD-9-CM: 300.00  3/17/2021        Neuropathy ICD-10-CM: G62.9  ICD-9-CM: 355.9  3/17/2021        Constipation, unspecified constipation type ICD-10-CM: K59.00  ICD-9-CM: 564.00  3/17/2021        Tobacco use ICD-10-CM: Z72.0  ICD-9-CM: 305.1  3/17/2021        Essential hypertension ICD-10-CM: I10  ICD-9-CM: 401.9  Unknown        History of myocardial infarction ICD-10-CM: I25.2  ICD-9-CM: 12  Unknown        Hypercholesteremia ICD-10-CM: E78.00  ICD-9-CM: 272.0  Unknown        Tachycardia ICD-10-CM: R00.0  ICD-9-CM: 785.0  Unknown        BPH with obstruction/lower urinary tract symptoms ICD-10-CM: N40.1, N13.8  ICD-9-CM: 600.01, 599.69  Unknown    Overview Signed 2019 11:08 AM by Bettie Campbell MD     s/p TURP 2008 with worsenintg s/s             Cognitive changes ICD-10-CM: R41.89  ICD-9-CM: 799.59  Unknown        Pulmonary nodules ICD-10-CM: R91.8  ICD-9-CM: 793.19  2019    Overview Signed 2020  4:05 PM by Bettie Campbell MD     stable 2020 3 mm nodule RML, 3 mm nodule LLL             Thrombocytopenia ([address] ICD-10-CM: D69.6  ICD-9-CM: 287.5  2019        Adenomatous polyp of transverse colon ICD-10-CM: D12.3  ICD-9-CM: 211.3  2019        Other insomnia ICD-10-CM: G47.09  ICD-9-CM: 780.52  2019        History of colon cancer ICD-10-CM: C87.950  ICD-9-CM: V10.05  2018    Overview Signed 3/17/2021  0:79 PM by Iain Julian MD     , status post hemicolectomy, unknown stage, had 2 rounds of radiation but did not tolerate so stopped. No chemo. Removed 3 feet. No further issues             History of benign spinal cord tumor ICD-10-CM: Z86.018  ICD-9-CM: V12.49      Overview Signed 3/17/2021  7:38 PM by MD Dr. Angelic Francis surgery 2018.   MRI 2018 Densely enhancing intraspinal tumor as described from L4 to the first sacral             Coronary artery disease involving native coronary artery of native heart without angina pectoris ICD-10-CM: I25.10  ICD-9-CM: 414.01  1993    Overview Addendum 3/17/2021  6:64 PM by Demetria Austin MD     S/p multiple MI's, stents and eventually CABG                      Plan:       1. Known history of CAD, PCI, CABG: Appears to be stable. Continue Aspirin, Crestor, Toprol-XL, Niaspan. Will cut down the Toprol-XL to half tablet p.o. daily due to bradycardia. We will add lisinopril 5 mg p.o. daily for blood pressure. 2.  Hypertension:Controlled. Continue Toprol-XL. 3.  Dyslipidemia:  Continue Crestor and Niaspan. 4.  Bradycardia: This is likely related to his conditioning as well as Toprol-XL. Will cut down the Toprol-XL to half tablet p.o. daily. We will add lisinopril 5 mg p.o. daily for blood pressure. 5. See Dr. Mich Gomes in 1 year. ATTENTION:   This medical record was transcribed using an electronic medical records/speech recognition system. Although proofread, it may and can contain electronic, spelling and other errors. Corrections may be executed at a later time. Please feel free to contact us for any clarifications as needed. Subjective:     Aileen Harper, a 67y.o. year-old who presents for followup. He has known history of CAD, PCI, CABG, hypertension dyslipidemia. His CABG was performed in  and [address] [place] [address].  He has stents prior to that. He is returning today for follow-up. Denies any symptoms of chest pain, shortness of breath, lightheadedness or dizziness.   He is active and goes to the gym.    EKG demonstrated sinus bradycardia with heart rate of 49 bpm with occasional PAC. Normal axis normal intervals. CBC and CMP were personally reviewed. Fasting lipid profile is within normal range. Exam:     Physical Exam:  Visit Vitals  /81 (BP 1 Location: Right upper arm, BP Patient Position: Sitting)   Pulse (!) 48   Ht 5' 8\" (1.727 m)   Wt 158 lb (71.7 kg)   SpO2 98%   BMI 24.02 kg/m²     General appearance - alert, well appearing, and in no distress  Mental status - affect appropriate to mood  Eyes - sclera anicteric, moist mucous membranes  Neck - supple, no significant adenopathy  Chest - clear to auscultation, no wheezes, rales or rhonchi  Heart - normal rate, regular rhythm, normal S1, S2, no murmurs, rubs, clicks or gallops  Abdomen - soft, nontender, nondistended, no masses or organomegaly  Extremities - peripheral pulses normal, no pedal edema  Skin - normal coloration  no rashes    Medications:     Current Outpatient Medications   Medication Sig    gabapentin (NEURONTIN) 100 mg capsule Take 1 tablet by mouth in the morning, 1 tablet at noon, and 2 tablets in the evening (Patient taking differently: 400 mg. Take 1 tablet by mouth in the morning, 1 tablet at noon, and 2 tablets in the evening)    folic acid/multivit-min/lutein (CENTRUM SILVER PO) Take  by mouth.  niacin (NIASPAN) 1,000 mg Tb24 tab TAKE 2 TABLETS NIGHTLY    folic acid (FOLVITE) 1 mg tablet TAKE 1 TABLET NIGHTLY    metoprolol succinate (TOPROL-XL) 25 mg XL tablet TAKE 1 TABLET NIGHTLY    Crestor 10 mg tablet TAKE 1 TABLET NIGHTLY    polyethylene glycol (Miralax) 17 gram/dose powder Take 17 g by mouth daily.  ALPRAZolam (XANAX) 0.5 mg tablet Take 1 Tab by mouth nightly as needed for Anxiety. Max Daily Amount: 0.5 mg.    Aspirin, Buffered 81 mg tab Take 81 mg by mouth nightly.  omega-3 fatty acids-vitamin e (FISH OIL) 1,000 mg cap Take 1 Cap by mouth two (2) times a day.     ascorbic acid (VITAMIN C) 500 mg tablet Take 500 mg by mouth daily.  eszopiclone (LUNESTA) 2 mg tablet Take 1 Tab by mouth nightly. Max Daily Amount: 2 mg.  zolpidem CR (AMBIEN CR) 12.5 mg tablet Take 1 Tab by mouth nightly as needed for Sleep. Max Daily Amount: 12.5 mg. No current facility-administered medications for this visit. Diagnostic Data Review:       No specialty comments available. Lab Review:     Lab Results   Component Value Date/Time    Cholesterol, total 111 03/17/2021 09:29 AM    HDL Cholesterol 43 03/17/2021 09:29 AM    LDL, calculated 49.6 03/17/2021 09:29 AM    Triglyceride 92 03/17/2021 09:29 AM    CHOL/HDL Ratio 2.6 03/17/2021 09:29 AM     Lab Results   Component Value Date/Time    Creatinine 1.03 03/17/2021 09:29 AM     Lab Results   Component Value Date/Time    BUN 14 03/17/2021 09:29 AM     Lab Results   Component Value Date/Time    Potassium 4.2 03/17/2021 09:29 AM     Lab Results   Component Value Date/Time    Hemoglobin A1c 5.6 03/17/2021 09:29 AM     Lab Results   Component Value Date/Time    HGB 14.2 03/17/2021 09:29 AM     Lab Results   Component Value Date/Time    PLATELET 624 (L) 19/92/0784 09:29 AM     No results for input(s): CPK, CKMB, TROIQ in the last 72 hours. No lab exists for component: CKQMB, CPKMB             ___________________________________________________    [facility] Ends.  Beryl Bray MD, [facility]

## 2021-04-28 ENCOUNTER — TELEPHONE (OUTPATIENT)
Dept: CARDIOLOGY CLINIC | Age: 76
End: 2021-04-28

## 2021-04-28 NOTE — TELEPHONE ENCOUNTER
Unable to reach pt. Message left \"stating I was returning his call. That he can try to call me this afternoon but we have clinic, or he can reach me through [facility] [address]. \"

## 2021-04-28 NOTE — TELEPHONE ENCOUNTER
Patient would like to speak with the nurse regarding his recent visit and the changes that were made to his medication. Patient is concerned as his avs nor his mychart. Please advise.     Phone: [phone]

## 2021-04-29 ENCOUNTER — TELEPHONE (OUTPATIENT)
Dept: INTERNAL MEDICINE CLINIC | Age: 76
End: 2021-04-29

## 2021-04-29 NOTE — TELEPHONE ENCOUNTER
----- Message from Kevyn Coe sent at 4/29/2021 10:33 AM EDT -----  Regarding: MD Carmelita/Telephone  General Message/Vendor Calls    Caller's first and last name: Self      Reason for call: Asking if he is due for appt. Callback required yes/no and why: Yes      Best contact number(s): [phone]      Details to clarify the request: Pt continuously receiving emails telling him he is behind on his appts, asking if he needs to come in or if it is an error.        Kevyn Coe

## 2021-05-28 ENCOUNTER — OFFICE VISIT (OUTPATIENT)
Dept: ONCOLOGY | Age: 76
End: 2021-05-28
Payer: MEDICARE

## 2021-05-28 VITALS
HEIGHT: 68 IN | TEMPERATURE: 97.8 F | RESPIRATION RATE: 16 BRPM | DIASTOLIC BLOOD PRESSURE: 84 MMHG | SYSTOLIC BLOOD PRESSURE: 129 MMHG | OXYGEN SATURATION: 98 % | HEART RATE: 49 BPM | BODY MASS INDEX: 22.54 KG/M2 | WEIGHT: 148.7 LBS

## 2021-05-28 DIAGNOSIS — Z11.59 ENCOUNTER FOR SCREENING FOR OTHER VIRAL DISEASES: ICD-10-CM

## 2021-05-28 DIAGNOSIS — D69.6 THROMBOCYTOPENIA (HCC): Primary | ICD-10-CM

## 2021-05-28 DIAGNOSIS — Z95.1 S/P CABG (CORONARY ARTERY BYPASS GRAFT): ICD-10-CM

## 2021-05-28 DIAGNOSIS — R00.1 SINUS BRADYCARDIA: ICD-10-CM

## 2021-05-28 DIAGNOSIS — I25.10 CORONARY ARTERY DISEASE INVOLVING NATIVE CORONARY ARTERY OF NATIVE HEART WITHOUT ANGINA PECTORIS: ICD-10-CM

## 2021-05-28 PROCEDURE — G8752 SYS BP LESS 140: HCPCS | Performed by: INTERNAL MEDICINE

## 2021-05-28 PROCEDURE — G0463 HOSPITAL OUTPT CLINIC VISIT: HCPCS | Performed by: INTERNAL MEDICINE

## 2021-05-28 PROCEDURE — G8754 DIAS BP LESS 90: HCPCS | Performed by: INTERNAL MEDICINE

## 2021-05-28 PROCEDURE — 99204 OFFICE O/P NEW MOD 45 MIN: CPT | Performed by: INTERNAL MEDICINE

## 2021-05-28 NOTE — LETTER
5/28/2021    Patient: Vinayak Tejada   YOB: 1945   Date of Visit: 5/28/2021     [facility] Patient, [address] [place]  [address]  [address] [place] [address]  [employer]ia [employer]    Dear Garcia Barriga MD,      Thank you for referring Mr. Vinayak Tejada to [facility] for evaluation. My notes for this consultation are attached. If you have questions, please do not hesitate to call me. I look forward to following your patient along with you.       Sincerely,    Vaughn Oseguera MD

## 2021-05-28 NOTE — PROGRESS NOTES
[address] [facility] at [facility]  [phone]    Hematology / Oncology Consult    Reason for Visit:   Melody Rosenberg is a 67 y.o. male who is seen in consultation at the request of Dr. Maria Teresa Maldonado for evaluation of thrombocytopenia. History of Present Illness:   Melody Rosenberg is a 67 y.o. male who is seen for thrombocytopenia. Review of labs in our system reveals PLT count in range of  since at least 2013. The PLT count of 89 was reportedly at time of a febrile illness. No EtOH abuse or liver disease. No fevers, chills, sweats. No unintentional weight loss, but has \"lost 30-lbs\" intentionally; however review of VS in our system reveals 12-lb weight loss in past 5 months. He goes the gym frequently. No recurrent fevers, chills. No family h/o blood disorders. PMHx: BPH, Carpal tunnel syndrome on Left, h/o MI, XOL, stable pulm nodules  PSurgHx: Cataract removal, CABG, bilat inguinal hernia repair, lumbar diskectomy, tonsillectomy, partial colectomy, TURP   SHx: Former smoker, quit 5/2012 after 50 pk yrs, EtOH 3 drinks/week  FHx: Father with heart disease, Mother with DM, Maternal GM with pancreas cancer. Current Outpatient Medications   Medication Sig    metoprolol succinate (TOPROL-XL) 25 mg XL tablet TAKE 1/2 TABLET BY MOUTH DAILY    lisinopriL (PRINIVIL, ZESTRIL) 5 mg tablet Take 1 Tab by mouth daily.  gabapentin (NEURONTIN) 100 mg capsule Take 1 tablet by mouth in the morning, 1 tablet at noon, and 2 tablets in the evening (Patient taking differently: 400 mg. Take 1 tablet by mouth in the morning, 1 tablet at noon, and 2 tablets in the evening)    eszopiclone (LUNESTA) 2 mg tablet Take 1 Tab by mouth nightly. Max Daily Amount: 2 mg.  folic acid/multivit-min/lutein (CENTRUM SILVER PO) Take  by mouth.  zolpidem CR (AMBIEN CR) 12.5 mg tablet Take 1 Tab by mouth nightly as needed for Sleep.  Max Daily Amount: 12.5 mg.    niacin (NIASPAN) 1,000 mg Tb24 tab TAKE 2 TABLETS NIGHTLY    folic acid (FOLVITE) 1 mg tablet TAKE 1 TABLET NIGHTLY    Crestor 10 mg tablet TAKE 1 TABLET NIGHTLY    polyethylene glycol (Miralax) 17 gram/dose powder Take 17 g by mouth daily.  ALPRAZolam (XANAX) 0.5 mg tablet Take 1 Tab by mouth nightly as needed for Anxiety. Max Daily Amount: 0.5 mg.    Aspirin, Buffered 81 mg tab Take 81 mg by mouth nightly.  omega-3 fatty acids-vitamin e (FISH OIL) 1,000 mg cap Take 1 Cap by mouth two (2) times a day.  ascorbic acid (VITAMIN C) 500 mg tablet Take 500 mg by mouth daily. No current facility-administered medications for this visit. Allergies   Allergen Reactions    Rosuvastatin Myalgia     Generic rosuvastatin causes myalgias        Review of Systems: A complete review of systems was obtained, negative except as described above. Physical Exam:   Blood pressure 129/84, pulse (!) 49, temperature 97.8 °F (36.6 °C), resp. rate 16, height 5' 8\" (1.727 m), weight 148 lb 11.2 oz (67.4 kg), SpO2 98 %. ECOG PS: 0  General: Well developed, no acute distress  Eyes: PERRLA, EOMI, anicteric sclerae  HENT: Atraumatic, OP clear, TMs intact without erythema  Neck: Supple  Lymphatic: No cervical, supraclavicular, axillary adenopathy  Respiratory: CTAB, normal respiratory effort  CV: Normal rate, regular rhythm, no murmurs, no peripheral edema  GI: Soft, nontender, nondistended, no masses, no hepatomegaly, no splenomegaly  MS: Normal gait and station. Digits without clubbing or cyanosis. Skin: No rashes, ecchymoses, or petechiae. Normal temperature, turgor, and texture. Neuro/Psych: Alert, oriented. Moves all 4 extremities. Appropriate affect, normal judgment/insight. Results:     Lab Results   Component Value Date/Time    WBC 4.4 03/17/2021 09:29 AM    HGB 14.2 03/17/2021 09:29 AM    HCT 43.6 03/17/2021 09:29 AM    PLATELET 970 (L) 76/16/0940 09:29 AM    .9 (H) 03/17/2021 09:29 AM    ABS.  NEUTROPHILS 2.1 03/17/2021 09:29 AM Lab Results   Component Value Date/Time    Sodium 141 03/17/2021 09:29 AM    Potassium 4.2 03/17/2021 09:29 AM    Chloride 106 03/17/2021 09:29 AM    CO2 30 03/17/2021 09:29 AM    Glucose 87 03/17/2021 09:29 AM    BUN 14 03/17/2021 09:29 AM    Creatinine 1.03 03/17/2021 09:29 AM    GFR est AA >60 03/17/2021 09:29 AM    GFR est non-AA >60 03/17/2021 09:29 AM    Calcium 9.6 03/17/2021 09:29 AM     Lab Results   Component Value Date/Time    Bilirubin, total 0.8 03/17/2021 09:29 AM    ALT (SGPT) 29 03/17/2021 09:29 AM    Alk. phosphatase 77 03/17/2021 09:29 AM    Protein, total 7.1 03/17/2021 09:29 AM    Protein, total 7.2 03/17/2021 09:29 AM    Albumin 4.4 03/17/2021 09:29 AM    Globulin 2.8 03/17/2021 09:29 AM     No results found for: IRON, FE, TIBC, IBCT, PSAT, FERR    Lab Results   Component Value Date/Time    Vitamin B12 1,604 (H) 03/17/2021 09:29 AM    Folate 63.4 (H) 03/17/2021 09:29 AM     Lab Results   Component Value Date/Time    TSH 2.14 03/17/2021 09:29 AM     No results found for: HAMAT, HAAB, HABT, HAAT, HBSAG, HBSB, HBSAT, HBABN, HBCM, HBCAB, HBCAT, XBCABS, HBEAB, HBEAG, XHEPCS, [id number], HBEGLT, [place], HBCLT, HBEBLT, [id number], [id number], [address] [place] [address], [id number], HBCMLT, [id number], HCGAT      Imaging:     Radiology report(s) reviewed. Assessment & Plan:   Ron Ayala is a 67 y.o. male with CAD, HTN, Da Shoaib who is seen for thrombocytopenia. 1. Thrombocytopenia:   Chronic since at least 2013. No B12, folate deficiency. HCV, HIV negative. UPEP negative. No medication culprits. No B symptoms. No EtOH abuse or known liver disease. Given concurrent macrocytosis, it is possible that patient has MDS (Myelodysplastic syndrome). I discussed with patient that MDS can cause a sluggish bone marrow with low counts. However, it does not require treatment unless PLT count remains persistently below 80-90 range. Therefore, I recommend monitoring his PLT count periodically 2-3 times a year.  I recommend delaying bone marrow biopsy until the future if needed. -- Check Hep B profile, recheck CBC today - will call with results  -- Check abdomen ultrasound to rule out hepatosplenomegaly - call with results  -- Return in 6 months with repeat CBC    2. CAD: s/p PCI and CABG. On ASA, Crestor, Toprol-XL, Niaspan. Recently was advised to decrease Toprol XL to 1/2 tab given bradycardia. Lisinopril added. 4.  Sinus bradycardia: 2/2 Toprol-XL and had recent dose decrease. I appreciate the opportunity to participate in Mr. Tiara Barker's care.     Signed By: Ilia García MD     May 28, 2021

## 2021-05-28 NOTE — PROGRESS NOTES
Chief Complaint   Patient presents with    New Patient     Karen Tucker is a pleasant 67year old male who presents as a new patient for thrombocytopenia.  He denies pain

## 2021-06-03 ENCOUNTER — HOSPITAL ENCOUNTER (OUTPATIENT)
Dept: ULTRASOUND IMAGING | Age: 76
Discharge: HOME | End: 2021-06-03
Attending: INTERNAL MEDICINE
Payer: MEDICARE

## 2021-06-03 DIAGNOSIS — D69.6 THROMBOCYTOPENIA (HCC): ICD-10-CM

## 2021-06-03 PROCEDURE — 76700 US EXAM ABDOM COMPLETE: CPT

## 2021-06-04 ENCOUNTER — TELEPHONE (OUTPATIENT)
Dept: ONCOLOGY | Age: 76
End: 2021-06-04

## 2021-06-04 NOTE — TELEPHONE ENCOUNTER
[facility] [place]  [phone]      06/04/21 2:00 PM Patient returned call. Advised of MD message as noted by Laina Miranda. Patient voiced understanding. No further questions or concerns at this time.

## 2021-06-04 NOTE — TELEPHONE ENCOUNTER
lvm to patient to return call to advise him (following up on US results) that  per Dr. Humphrey Show  No further action needed. He just needs to follow up at our next visit with repeat labs like we discussed.

## 2021-06-08 ENCOUNTER — OFFICE VISIT (OUTPATIENT)
Dept: INTERNAL MEDICINE CLINIC | Age: 76
End: 2021-06-08
Payer: MEDICARE

## 2021-06-08 VITALS
BODY MASS INDEX: 22.49 KG/M2 | RESPIRATION RATE: 17 BRPM | HEIGHT: 68 IN | WEIGHT: 148.4 LBS | SYSTOLIC BLOOD PRESSURE: 136 MMHG | OXYGEN SATURATION: 100 % | HEART RATE: 53 BPM | DIASTOLIC BLOOD PRESSURE: 76 MMHG | TEMPERATURE: 98 F

## 2021-06-08 DIAGNOSIS — I10 ESSENTIAL HYPERTENSION: ICD-10-CM

## 2021-06-08 DIAGNOSIS — E78.49 OTHER HYPERLIPIDEMIA: ICD-10-CM

## 2021-06-08 DIAGNOSIS — Z23 ENCOUNTER FOR IMMUNIZATION: ICD-10-CM

## 2021-06-08 DIAGNOSIS — G62.9 NEUROPATHY: ICD-10-CM

## 2021-06-08 DIAGNOSIS — F41.9 ANXIETY: ICD-10-CM

## 2021-06-08 DIAGNOSIS — D69.6 THROMBOCYTOPENIA (HCC): ICD-10-CM

## 2021-06-08 DIAGNOSIS — I25.10 ATHEROSCLEROSIS OF NATIVE CORONARY ARTERY OF NATIVE HEART WITHOUT ANGINA PECTORIS: ICD-10-CM

## 2021-06-08 DIAGNOSIS — G47.09 OTHER INSOMNIA: Primary | ICD-10-CM

## 2021-06-08 PROBLEM — G47.00 INSOMNIA: Status: ACTIVE | Noted: 2019-05-20

## 2021-06-08 PROCEDURE — G8754 DIAS BP LESS 90: HCPCS | Performed by: INTERNAL MEDICINE

## 2021-06-08 PROCEDURE — G8427 DOCREV CUR MEDS BY ELIG CLIN: HCPCS | Performed by: INTERNAL MEDICINE

## 2021-06-08 PROCEDURE — 99214 OFFICE O/P EST MOD 30 MIN: CPT | Performed by: INTERNAL MEDICINE

## 2021-06-08 PROCEDURE — 90715 TDAP VACCINE 7 YRS/> IM: CPT | Performed by: INTERNAL MEDICINE

## 2021-06-08 PROCEDURE — 1101F PT FALLS ASSESS-DOCD LE1/YR: CPT | Performed by: INTERNAL MEDICINE

## 2021-06-08 PROCEDURE — G8752 SYS BP LESS 140: HCPCS | Performed by: INTERNAL MEDICINE

## 2021-06-08 PROCEDURE — G8510 SCR DEP NEG, NO PLAN REQD: HCPCS | Performed by: INTERNAL MEDICINE

## 2021-06-08 PROCEDURE — G8420 CALC BMI NORM PARAMETERS: HCPCS | Performed by: INTERNAL MEDICINE

## 2021-06-08 PROCEDURE — G0463 HOSPITAL OUTPT CLINIC VISIT: HCPCS | Performed by: INTERNAL MEDICINE

## 2021-06-08 PROCEDURE — G8536 NO DOC ELDER MAL SCRN: HCPCS | Performed by: INTERNAL MEDICINE

## 2021-06-08 RX ORDER — ROSUVASTATIN CALCIUM 10 MG/1
TABLET, FILM COATED ORAL
Qty: 90 TABLET | Refills: 3 | Status: SHIPPED | OUTPATIENT
Start: 2021-06-08 | End: 2022-04-10

## 2021-06-08 RX ORDER — LISINOPRIL 5 MG/1
5 TABLET ORAL DAILY
Qty: 90 TABLET | Refills: 3 | Status: SHIPPED | OUTPATIENT
Start: 2021-06-08 | End: 2022-08-01 | Stop reason: SDUPTHER

## 2021-06-08 RX ORDER — ZOLPIDEM TARTRATE 12.5 MG/1
12.5 TABLET, FILM COATED, EXTENDED RELEASE ORAL
Qty: 30 TABLET | Refills: 3 | Status: SHIPPED | OUTPATIENT
Start: 2021-06-08 | End: 2021-10-06

## 2021-06-08 RX ORDER — GABAPENTIN 100 MG/1
CAPSULE ORAL
Qty: 120 CAPSULE | Refills: 2 | Status: SHIPPED | OUTPATIENT
Start: 2021-06-08 | End: 2021-08-29 | Stop reason: SDUPTHER

## 2021-06-08 RX ORDER — ALPRAZOLAM 0.5 MG/1
0.5 TABLET ORAL
Qty: 30 TABLET | Refills: 1 | Status: SHIPPED | OUTPATIENT
Start: 2021-06-08 | End: 2021-10-10

## 2021-06-08 RX ORDER — NIACIN 1000 MG/1
TABLET, EXTENDED RELEASE ORAL
Qty: 180 TABLET | Refills: 3 | Status: SHIPPED | OUTPATIENT
Start: 2021-06-08 | End: 2022-08-01 | Stop reason: SDUPTHER

## 2021-06-08 RX ORDER — METOPROLOL SUCCINATE 25 MG/1
12.5 TABLET, EXTENDED RELEASE ORAL DAILY
Qty: 45 TABLET | Refills: 3 | Status: SHIPPED | OUTPATIENT
Start: 2021-06-08 | End: 2022-07-12 | Stop reason: SDUPTHER

## 2021-06-08 NOTE — PATIENT INSTRUCTIONS
DO NOT take xanax and zolpidem Ann Rosenberg) together. This increases your risk of overdose, oversedation, and death.

## 2021-06-08 NOTE — ASSESSMENT & PLAN NOTE
Uses Xanax as needed for anxiety. Okay to continue but advised patient not to use Xanax and Ambien at the same time due to risk of overdose, oversedation and death.

## 2021-06-08 NOTE — ASSESSMENT & PLAN NOTE
Stable.   Continue gabapentin 100 mg in the morning, 100 mg in the afternoon, and 200 mg in the evening

## 2021-06-08 NOTE — ASSESSMENT & PLAN NOTE
\"Increased his Ambien to 12.5 last visit. Still waking up at 430-5:00 in the morning and feels like he is not getting enough sleep at night. He goes to bed around 10 30-11. Says that the Ambien used to work better but has noticed that it is not working as well.     Recommend stopping Ambien and starting Lunesta. Advised to send us a message in 10 days let me know how its going\"    Lunesta did not work well for him. Reports distressed about waking up at 4 in the morning and nobody else is out. Does not think he can go to bed later in order to wake up later because he gets tired at night. Reports feeling refreshed when he wakes up but reports that his wife \"yells\" at him when he wakes up because it wakes her up. Patient has elected to go back to the Ambien 12.5, which was working better for him in the past.  Not interested in trying other medications right now. Advised not to take Ambien with Xanax.

## 2021-06-08 NOTE — ASSESSMENT & PLAN NOTE
Cardiology recently decreased his metoprolol dose due to bradycardia and added lisinopril. Well-controlled today. .  Continue lisinopril 5 mg daily and metoprolol XL 12.5 daily

## 2021-06-08 NOTE — PROGRESS NOTES
Note   Chief Complaint   Insomnia    Miguel Sanabria is a 67 y.o. male     1. Other insomnia  Assessment & Plan: \"Increased his Ambien to 12.5 last visit. Still waking up at 430-5:00 in the morning and feels like he is not getting enough sleep at night. He goes to bed around 10 30-11. Says that the Ambien used to work better but has noticed that it is not working as well.     Recommend stopping Ambien and starting Lunesta. Advised to send us a message in 10 days let me know how its going\"    Lunesta did not work well for him. Reports distressed about waking up at 4 in the morning and nobody else is out. Does not think he can go to bed later in order to wake up later because he gets tired at night. Reports feeling refreshed when he wakes up but reports that his wife \"yells\" at him when he wakes up because it wakes her up. Patient has elected to go back to the Ambien 12.5, which was working better for him in the past.  Not interested in trying other medications right now. Advised not to take Ambien with Xanax. Orders:  -     zolpidem CR (AMBIEN CR) 12.5 mg tablet; Take 1 Tablet by mouth nightly as needed for Sleep. Max Daily Amount: 12.5 mg., Normal, Disp-30 Tablet, R-3  2. Other hyperlipidemia  Assessment & Plan:  At goal.  Continue Crestor 10 mg and niacin and fish oil  Orders:  -     Crestor 10 mg tablet; TAKE 1 TABLET NIGHTLY, Normal, Disp-90 Tablet, R-3, CECE  -     niacin (NIASPAN) 1,000 mg Tb24 tab; TAKE 2 TABLETS NIGHTLY, Normal, Disp-180 Tablet, R-3  3. Neuropathy  Assessment & Plan:  Stable. Continue gabapentin 100 mg in the morning, 100 mg in the afternoon, and 200 mg in the evening  Orders:  -     gabapentin (NEURONTIN) 100 mg capsule;  Take 1 tablet by mouth in the morning, 1 tablet at noon, and 2 tablets in the evening, Normal, Disp-120 Capsule, R-2Not to exceed 3 additional fills before 05/30/2021.  4. Atherosclerosis of native coronary artery of native heart without angina pectoris  Assessment & Plan:  Stable. Continue aspirin, Crestor, niacin, lisinopril, metoprolol. No changes recommended. Orders:  -     Crestor 10 mg tablet; TAKE 1 TABLET NIGHTLY, Normal, Disp-90 Tablet, R-3, CECE  -     metoprolol succinate (TOPROL-XL) 25 mg XL tablet; Take 0.5 Tablets by mouth daily. Indications: ventricular rate control in atrial fibrillation, Normal, Disp-45 Tablet, R-3  -     niacin (NIASPAN) 1,000 mg Tb24 tab; TAKE 2 TABLETS NIGHTLY, Normal, Disp-180 Tablet, R-3  5. Anxiety  Assessment & Plan:  Uses Xanax as needed for anxiety. Okay to continue but advised patient not to use Xanax and Ambien at the same time due to risk of overdose, oversedation and death. Orders:  -     ALPRAZolam (XANAX) 0.5 mg tablet; Take 1 Tablet by mouth nightly as needed for Anxiety. Max Daily Amount: 0.5 mg., Normal, Disp-30 Tablet, R-1  6. Encounter for immunization  -     TETANUS, DIPHTHERIA TOXOIDS AND ACELLULAR PERTUSSIS VACCINE (TDAP), IN INDIVIDS. >=7, IM  7. Essential hypertension  Assessment & Plan:  Cardiology recently decreased his metoprolol dose due to bradycardia and added lisinopril. Well-controlled today. .  Continue lisinopril 5 mg daily and metoprolol XL 12.5 daily  Orders:  -     lisinopriL (PRINIVIL, ZESTRIL) 5 mg tablet; Take 1 Tablet by mouth daily. , Normal, Disp-90 Tablet, R-3  8. Thrombocytopenia [facility])  Assessment & Plan:  Last visit: \"From last visit:  \"Has had thrombocytopenia at least since 2013.  Initial test in 11/2013 (140) was in the periop period for his CABG. [facility] 6/2015 reading (89) was an ER visit when he had a febrile illness. [facility] 3/2019 labs (134) were during routine labs.  4/2020 labs were also routine (124)\"     Platelets 473 last visit. UPEP negative. Smear unrevealing. SPEP was unable to be obtained due to not enough sample. Thyroid studies normal.  B12 and folate above normal limits.   HIV and hep C negative.     I think this is most likely ITP but will refer to hematology for confirmation and further work-up if needed. \"    Saw heme-onc since his last visit. They obtained a abdominal ultrasound that did not show hepato or splenomegaly. Advised to monitor every every 6 months       Benefits, risks, possible drug interactions, and side effects of all new medications were reviewed with the patient. Pt verbalized understanding. Return to clinic: 4 months for sleep    An electronic signature was used to authenticate this note. Gloria Desai MD  [facility]  6/8/2021    Future Appointments   Date Time Provider Prakash Mckinley   89/7/9805  8:06 AM Ernie Sadler MD [facility] BS [facility]   11/18/2021  9:30 AM El Seymour [facility]   4/28/2022  8:40 AM Claudia Villa [facility]        Objective   Vitals:       Visit Vitals  /76 (BP 1 Location: Left upper arm, BP Patient Position: Sitting, BP Cuff Size: Adult)   Pulse (!) 53   Temp 98 °F (36.7 °C) (Oral)   Resp 17   Ht 5' 8\" (1.727 m)   Wt 148 lb 6.4 oz (67.3 kg)   SpO2 100%   BMI 22.56 kg/m²        Physical Exam  Constitutional:       Appearance: Normal appearance. He is not ill-appearing. Cardiovascular:      Rate and Rhythm: Normal rate and regular rhythm. Heart sounds: No murmur heard. No friction rub. No gallop. Pulmonary:      Effort: No respiratory distress. Breath sounds: Normal breath sounds. No wheezing, rhonchi or rales. Neurological:      Mental Status: He is alert. Current Outpatient Medications   Medication Sig    zolpidem CR (AMBIEN CR) 12.5 mg tablet Take 1 Tablet by mouth nightly as needed for Sleep. Max Daily Amount: 12.5 mg.    Crestor 10 mg tablet TAKE 1 TABLET NIGHTLY    gabapentin (NEURONTIN) 100 mg capsule Take 1 tablet by mouth in the morning, 1 tablet at noon, and 2 tablets in the evening    lisinopriL (PRINIVIL, ZESTRIL) 5 mg tablet Take 1 Tablet by mouth daily.     metoprolol succinate (TOPROL-XL) 25 mg XL tablet Take 0.5 Tablets by mouth daily. Indications: ventricular rate control in atrial fibrillation    niacin (NIASPAN) 1,000 mg Tb24 tab TAKE 2 TABLETS NIGHTLY    ALPRAZolam (XANAX) 0.5 mg tablet Take 1 Tablet by mouth nightly as needed for Anxiety. Max Daily Amount: 0.5 mg.  polyethylene glycol (Miralax) 17 gram/dose powder Take 17 g by mouth daily.  Aspirin, Buffered 81 mg tab Take 81 mg by mouth nightly.  omega-3 fatty acids-vitamin e (FISH OIL) 1,000 mg cap Take 1 Cap by mouth two (2) times a day.  ascorbic acid (VITAMIN C) 500 mg tablet Take 500 mg by mouth daily. No current facility-administered medications for this visit.

## 2021-06-08 NOTE — ASSESSMENT & PLAN NOTE
Last visit: \"From last visit:  \"Has had thrombocytopenia at least since 2013.  Initial test in 11/2013 (140) was in the periop period for his CABG. [facility] 6/2015 reading (89) was an ER visit when he had a febrile illness. [facility] 3/2019 labs (134) were during routine labs.  4/2020 labs were also routine (124)\"     Platelets 530 last visit. UPEP negative. Smear unrevealing. SPEP was unable to be obtained due to not enough sample. Thyroid studies normal.  B12 and folate above normal limits. HIV and hep C negative.     I think this is most likely ITP but will refer to hematology for confirmation and further work-up if needed. \"    Saw heme-onc since his last visit. They obtained a abdominal ultrasound that did not show hepato or splenomegaly.     Advised to monitor every every 6 months

## 2021-06-08 NOTE — PROGRESS NOTES
Room:     Identified pt with two pt identifiers(name and ). Reviewed record in preparation for visit and have obtained necessary documentation. All patient medications has been reviewed. Chief Complaint   Patient presents with    Medication Refill       Health Maintenance Due   Topic    DTaP/Tdap/Td series (1 - Tdap)    Medicare Yearly Exam        Vitals:    21 0857   BP: 136/76   Pulse: (!) 53   Resp: 17   Temp: 98 °F (36.7 °C)   TempSrc: Oral   SpO2: 100%   Weight: 148 lb 6.4 oz (67.3 kg)   Height: 5' 8\" (1.727 m)   PainSc:   0 - No pain       4. Have you been to the ER, urgent care clinic since your last visit? Hospitalized since your last visit? No    5. Have you seen or consulted any other health care providers outside of the [address] [place] [address] since your last visit? Include any pap smears or colon screening. No      Patient is accompanied by self I have received verbal consent from [employer] to discuss any/all medical information while they are present in the room.

## 2021-06-10 ENCOUNTER — TELEPHONE (OUTPATIENT)
Dept: INTERNAL MEDICINE CLINIC | Age: 76
End: 2021-06-10

## 2021-06-10 NOTE — TELEPHONE ENCOUNTER
Caren Luciachcock Tech with [facility] states we prescribed Crestor but attacthed a note stating patient has a rouvastatin allergy , states Crestor is Rouvastatin, asking confirmation to fill script.        Direct call back # [phone]   ref # [id number]

## 2021-06-10 NOTE — TELEPHONE ENCOUNTER
Yes, fill the script for BRAND crestor. He had side effects with the generic rosuvastatin.   He tolerates the [employer] crestor

## 2021-07-13 NOTE — PROGRESS NOTES
PT DAILY TREATMENT NOTE - [facility] 2-15    Patient Name: Kami Higgins  Date:2018  : 1945  [x]  Patient  Verified  [place]or: [place] MED[place] / Plan: [place] MEDICARE PART A & B / Product Type: Medicare /    In time:9:50a  Out time:10:45a  Total Treatment Time (min): 55  Total Timed Codes (min): 55  1:1 Treatment Time ( only): 54   Visit #: 2     Treatment Area: Lumbago with sciatica, left side [M54.42]  Other chronic pain [G89.29]    SUBJECTIVE  Pain Level (0-10 scale): 5   Any medication changes, allergies to medications, adverse drug reactions, diagnosis change, or new procedure performed?: [x] No    [] Yes (see summary sheet for update)  Subjective functional status/changes:   [] No changes reported  Patient reports he has been hurting more after doing the exercises. OBJECTIVE      55 min Therapeutic Exercise:  [x] See flow sheet :   Rationale: increase ROM, increase strength, improve coordination, improve balance and increase proprioception to improve the patients ability to return to activities without pain. With   [] TE   [] TA   [] neuro   [] other: Patient Education: [x] Review HEP    [] Progressed/Changed HEP based on:   [] positioning   [] body mechanics   [] transfers   [] heat/ice application    [] other:      Other Objective/Functional Measures: max verbal cues for PPT, no pain with interventions, pt reports he feels better after the exercises today. Pain Level (0-10 scale) post treatment: 0    ASSESSMENT/Changes in Function:     Patient will continue to benefit from skilled PT services to modify and progress therapeutic interventions, address functional mobility deficits, address ROM deficits, address strength deficits, analyze and address soft tissue restrictions, analyze and cue movement patterns, analyze and modify body mechanics/ergonomics and assess and modify postural abnormalities to attain remaining goals.      []  See Plan of Care  []  See progress note/recertification  []  See Discharge Summary         Progress towards goals / Updated goals:  Patient demonstrates good tolerance for interventions and is able to advance several with no increased pain. Patient required mod to max cue for proper mechanics and pain free range.     PLAN  [x]  Upgrade activities as tolerated     [x]  Continue plan of care  []  Update interventions per flow sheet       []  Discharge due to:_  []  Other:_      Samina Larose 6/5/2018  9:58 AM Nsaids Counseling: NSAID Counseling: I discussed with the patient that NSAIDs should be taken with food. Prolonged use of NSAIDs can result in the development of stomach ulcers.  Patient advised to stop taking NSAIDs if abdominal pain occurs.  The patient verbalized understanding of the proper use and possible adverse effects of NSAIDs.  All of the patient's questions and concerns were addressed.

## 2021-08-29 DIAGNOSIS — G62.9 NEUROPATHY: ICD-10-CM

## 2021-08-29 RX ORDER — GABAPENTIN 100 MG/1
CAPSULE ORAL
Qty: 120 CAPSULE | Refills: 2 | Status: SHIPPED | OUTPATIENT
Start: 2021-10-02 | End: 2021-10-11 | Stop reason: SDUPTHER

## 2021-10-01 ENCOUNTER — OFFICE VISIT (OUTPATIENT)
Dept: INTERNAL MEDICINE CLINIC | Age: 76
End: 2021-10-01
Payer: MEDICARE

## 2021-10-01 VITALS
WEIGHT: 143 LBS | OXYGEN SATURATION: 99 % | TEMPERATURE: 97.4 F | BODY MASS INDEX: 21.67 KG/M2 | SYSTOLIC BLOOD PRESSURE: 136 MMHG | DIASTOLIC BLOOD PRESSURE: 79 MMHG | RESPIRATION RATE: 14 BRPM | HEIGHT: 68 IN | HEART RATE: 55 BPM

## 2021-10-01 DIAGNOSIS — Z87.891 PERSONAL HISTORY OF TOBACCO USE, PRESENTING HAZARDS TO HEALTH: ICD-10-CM

## 2021-10-01 DIAGNOSIS — Z00.00 MEDICARE ANNUAL WELLNESS VISIT, SUBSEQUENT: Primary | ICD-10-CM

## 2021-10-01 DIAGNOSIS — Z85.038 HISTORY OF COLON CANCER: ICD-10-CM

## 2021-10-01 DIAGNOSIS — I10 ESSENTIAL HYPERTENSION: ICD-10-CM

## 2021-10-01 DIAGNOSIS — G47.00 INSOMNIA, UNSPECIFIED TYPE: ICD-10-CM

## 2021-10-01 DIAGNOSIS — G62.9 NEUROPATHY: ICD-10-CM

## 2021-10-01 PROCEDURE — G8536 NO DOC ELDER MAL SCRN: HCPCS | Performed by: INTERNAL MEDICINE

## 2021-10-01 PROCEDURE — G8754 DIAS BP LESS 90: HCPCS | Performed by: INTERNAL MEDICINE

## 2021-10-01 PROCEDURE — G0439 PPPS, SUBSEQ VISIT: HCPCS | Performed by: INTERNAL MEDICINE

## 2021-10-01 PROCEDURE — G8752 SYS BP LESS 140: HCPCS | Performed by: INTERNAL MEDICINE

## 2021-10-01 PROCEDURE — 1101F PT FALLS ASSESS-DOCD LE1/YR: CPT | Performed by: INTERNAL MEDICINE

## 2021-10-01 PROCEDURE — G0296 VISIT TO DETERM LDCT ELIG: HCPCS | Performed by: INTERNAL MEDICINE

## 2021-10-01 PROCEDURE — G8420 CALC BMI NORM PARAMETERS: HCPCS | Performed by: INTERNAL MEDICINE

## 2021-10-01 PROCEDURE — 99214 OFFICE O/P EST MOD 30 MIN: CPT | Performed by: INTERNAL MEDICINE

## 2021-10-01 PROCEDURE — G8427 DOCREV CUR MEDS BY ELIG CLIN: HCPCS | Performed by: INTERNAL MEDICINE

## 2021-10-01 PROCEDURE — G8510 SCR DEP NEG, NO PLAN REQD: HCPCS | Performed by: INTERNAL MEDICINE

## 2021-10-01 PROCEDURE — G0463 HOSPITAL OUTPT CLINIC VISIT: HCPCS | Performed by: INTERNAL MEDICINE

## 2021-10-01 RX ORDER — LEMBOREXANT 5 MG/1
5 TABLET, FILM COATED ORAL DAILY
Qty: 30 EACH | Refills: 0 | Status: SHIPPED | OUTPATIENT
Start: 2021-10-01 | End: 2022-08-01

## 2021-10-01 NOTE — PROGRESS NOTES
Note   Chief Complaint   MCW    Lopez Sellers is a 67 y.o. male     1. Medicare annual wellness visit, subsequent  2. Insomnia, unspecified type  Assessment & Plan:  Ambien is \"the most useless pill you have given me. \"  Trial dayvigo  Orders:  -     lemborexant (Dayvigo) 5 mg tab; Take 5 mg by mouth daily. Max Daily Amount: 5 mg., Normal, Disp-30 Each, R-0  3. Essential hypertension  Assessment & Plan:  Medication recently changed prior to last visit. Denies any current chest pain, shortness of breath, lightheadedness, dizziness. Well-controlled today. Continue lisinopril 5 mg daily and metoprolol XL 12.5 daily  4. Neuropathy  Assessment & Plan:  Stable. Continue gabapentin 100 in the morning, 100 in the afternoon, 200 in the evening, no changes recommended  5. Personal history of tobacco use, presenting hazards to health  -     CT LOW DOSE LUNG CANCER SCREENING; Future  6. History of colon cancer   Discussed with patient current guidelines for screening for lung cancer. Current recommendations are to obtain yearly screening LDCT yearly for age 46-80, or until smoke free for 15 years. Patient has 50 pack year history of cigarette smoking and currently 0. Discussed with patient risks and benefits of screening, including over-diagnosis, false positive rate, and total radiation exposure. Patient currently exhibits no signs or symptoms suggestive of lung cancer. Discussed with patient importance of compliance with yearly annual lung cancer screenings and willingness to undergo diagnosis and treatment if screening scan is positive. In addition, patient was counseled regarding (remaining smoke free/total smoking cessation). Benefits, risks, possible drug interactions, and side effects of all new medications were reviewed with the patient. Pt verbalized understanding. Return to clinic:  1 year for physical or earlier if needed    An electronic signature was used to authenticate this note.   Deacon Fina Santos MD  [facility]  10/1/2021    Future Appointments   Date Time Provider Prakash Mckinley   11/18/2021  9:30 AM Ollie Agustin MD [facility]   4/28/2022  8:40 AM Juan Villa MD [facility] BS AMB        Objective   Vitals:       Visit Vitals  /79 (BP 1 Location: Left upper arm, BP Patient Position: Sitting, BP Cuff Size: Adult)   Pulse (!) 55   Temp 97.4 °F (36.3 °C) (Temporal)   Resp 14   Ht 5' 8\" (1.727 m)   Wt 143 lb (64.9 kg)   SpO2 99%   BMI 21.74 kg/m²        Physical Exam  Constitutional:       General: He is not in acute distress. Appearance: He is well-developed. HENT:      Right Ear: Tympanic membrane, ear canal and external ear normal.      Left Ear: Tympanic membrane, ear canal and external ear normal.   Eyes:      Extraocular Movements: Extraocular movements intact. Conjunctiva/sclera: Conjunctivae normal.   Cardiovascular:      Rate and Rhythm: Normal rate and regular rhythm. Pulses: Normal pulses. Heart sounds: No murmur heard. No friction rub. No gallop. Pulmonary:      Effort: No respiratory distress. Breath sounds: No wheezing, rhonchi or rales. Abdominal:      General: Bowel sounds are normal. There is no distension. Palpations: Abdomen is soft. There is no hepatomegaly, splenomegaly or mass. Tenderness: There is no abdominal tenderness. There is no guarding. Musculoskeletal:      Cervical back: Neck supple. Skin:     General: Skin is warm. Findings: No rash. Neurological:      Mental Status: He is alert. Current Outpatient Medications   Medication Sig    lemborexant (Dayvigo) 5 mg tab Take 5 mg by mouth daily. Max Daily Amount: 5 mg.    [START ON 10/2/2021] gabapentin (NEURONTIN) 100 mg capsule Take 1 tablet by mouth in the morning, 1 tablet at noon, and 2 tablets in the evening    zolpidem CR (AMBIEN CR) 12.5 mg tablet Take 1 Tablet by mouth nightly as needed for Sleep.  Max Daily Amount: 12.5 mg.    Crestor 10 mg tablet TAKE 1 TABLET NIGHTLY    lisinopriL (PRINIVIL, ZESTRIL) 5 mg tablet Take 1 Tablet by mouth daily.  metoprolol succinate (TOPROL-XL) 25 mg XL tablet Take 0.5 Tablets by mouth daily. Indications: ventricular rate control in atrial fibrillation    niacin (NIASPAN) 1,000 mg Tb24 tab TAKE 2 TABLETS NIGHTLY    ALPRAZolam (XANAX) 0.5 mg tablet Take 1 Tablet by mouth nightly as needed for Anxiety. Max Daily Amount: 0.5 mg.  polyethylene glycol (Miralax) 17 gram/dose powder Take 17 g by mouth daily.  Aspirin, Buffered 81 mg tab Take 81 mg by mouth nightly.  omega-3 fatty acids-vitamin e (FISH OIL) 1,000 mg cap Take 1 Cap by mouth two (2) times a day.  ascorbic acid (VITAMIN C) 500 mg tablet Take 500 mg by mouth daily. No current facility-administered medications for this visit. This is the Subsequent Medicare Annual Wellness Exam, performed 12 months or more after the Initial AWV or the last Subsequent AWV    I have reviewed the patient's medical history in detail and updated the computerized patient record. Assessment/Plan   Education and counseling provided:  Are appropriate based on today's review and evaluation    1. Medicare annual wellness visit, subsequent  2. Insomnia, unspecified type  Assessment & Plan:  Ambien is \"the most useless pill you have given me. \"  Trial dayvigo  Orders:  -     lemborexant (Dayvigo) 5 mg tab; Take 5 mg by mouth daily. Max Daily Amount: 5 mg., Normal, Disp-30 Each, R-0  3. Essential hypertension  Assessment & Plan:  Medication recently changed prior to last visit. Denies any current chest pain, shortness of breath, lightheadedness, dizziness. Well-controlled today. Continue lisinopril 5 mg daily and metoprolol XL 12.5 daily  4. Neuropathy  Assessment & Plan:  Stable. Continue gabapentin 100 in the morning, 100 in the afternoon, 200 in the evening, no changes recommended  5.  Personal history of tobacco use, presenting hazards to health  -     CT LOW DOSE LUNG CANCER SCREENING; Future  6. History of colon cancer       Depression Risk Factor Screening     3 most recent PHQ Screens 10/1/2021   Little interest or pleasure in doing things Not at all   Feeling down, depressed, irritable, or hopeless Not at all   Total Score PHQ 2 0       Alcohol Risk Screen    Do you average more than 1 drink per night or more than 7 drinks a week: No    In the past three months have you have had more than 4 drinks containing alcohol on one occasion: No        Functional Ability and Level of Safety    Hearing: Hearing is good. Activities of Daily Living: The home contains: no safety equipment. Patient does total self care      Ambulation: with no difficulty     Fall Risk:  Fall Risk Assessment, last 12 mths 10/1/2021   Able to walk? Yes   Fall in past 12 months? 0   Do you feel unsteady? 0   Are you worried about falling 0   Is TUG test greater than 12 seconds? 0   Is the gait abnormal? 0   Number of falls in past 12 months 0   Fall with injury?  0      Abuse Screen:  Patient is not abused       Cognitive Screening    Has your family/caregiver stated any concerns about your memory: wife mentions concerns but he doesn't have any \"I told you last week to take out the trash\"         Health Maintenance Due     Health Maintenance Due   Topic Date Due    Low dose CT lung screening  05/30/2020       Patient Care Team   Patient Care Team:  Kizzy Sanchez MD as PCP - General (Internal Medicine)  Kizzy Sanchez MD as PCP - [facility]  Rosario Dixon MD as Physician (Cardiology)    History     Patient Active Problem List   Diagnosis Code    History of colon cancer Z85.038    Thrombocytopenia ([facility] [address] D69.6    Adenomatous polyp of transverse colon D12.3    Insomnia G47.00    Atherosclerosis of native coronary artery of native heart without angina pectoris I25.10    Essential hypertension I10    History of myocardial infarction I25.2    Hyperlipidemia E78.5    Tachycardia R00.0    BPH with obstruction/lower urinary tract symptoms N40.1, N13.8    Cognitive changes R41.89    Pulmonary nodules R91.8    History of benign spinal cord tumor Z86.018    Carpal tunnel syndrome on left G56.02    Hx of CABG Z95.1    Anxiety F41.9    Neuropathy G62.9    Constipation, unspecified constipation type K59.00    Tobacco use Z72.0    Medicare annual wellness visit, subsequent Z00.00     Past Medical History:   Diagnosis Date    BPH with obstruction/lower urinary tract symptoms     s/p TURP 2008 with worsenintg s/s    Carpal tunnel syndrome on left     s/p repair, caused L arm pains chronically    History of myocardial infarction     Hypercholesteremia     Pulmonary nodules 05/2019    stable 12/2020 3 mm nodule RML, 3 mm nodule LLL    Tachycardia       Past Surgical History:   Procedure Laterality Date    COLONOSCOPY N/A 5/9/2019    COLONOSCOPY performed by Tonya Davila MD at [place] [address]    caused chronic pain L arm from nerve block    HX CATARACT REMOVAL Bilateral     HX CORONARY ARTERY BYPASS GRAFT  1999    cabg x 3    HX HEART CATHETERIZATION      multiple stents    HX HERNIA REPAIR      bilateral inguinal    HX LUMBAR DISKECTOMY  06/2018    Dr Balta Ruffin. s/p spinal tumor resection    HX TONSILLECTOMY      HX TOTAL COLECTOMY  2001    partial    HX TURP  2008     Current Outpatient Medications   Medication Sig Dispense Refill    lemborexant (Dayvigo) 5 mg tab Take 5 mg by mouth daily. Max Daily Amount: 5 mg. 30 Each 0    [START ON 10/2/2021] gabapentin (NEURONTIN) 100 mg capsule Take 1 tablet by mouth in the morning, 1 tablet at noon, and 2 tablets in the evening 120 Capsule 2    zolpidem CR (AMBIEN CR) 12.5 mg tablet Take 1 Tablet by mouth nightly as needed for Sleep.  Max Daily Amount: 12.5 mg. 30 Tablet 3    Crestor 10 mg tablet TAKE 1 TABLET NIGHTLY 90 Tablet 3    lisinopriL (PRINIVIL, ZESTRIL) 5 mg tablet Take 1 Tablet by mouth daily. 90 Tablet 3    metoprolol succinate (TOPROL-XL) 25 mg XL tablet Take 0.5 Tablets by mouth daily. Indications: ventricular rate control in atrial fibrillation 45 Tablet 3    niacin (NIASPAN) 1,000 mg Tb24 tab TAKE 2 TABLETS NIGHTLY 180 Tablet 3    ALPRAZolam (XANAX) 0.5 mg tablet Take 1 Tablet by mouth nightly as needed for Anxiety. Max Daily Amount: 0.5 mg. 30 Tablet 1    polyethylene glycol (Miralax) 17 gram/dose powder Take 17 g by mouth daily.  Aspirin, Buffered 81 mg tab Take 81 mg by mouth nightly.  omega-3 fatty acids-vitamin e (FISH OIL) 1,000 mg cap Take 1 Cap by mouth two (2) times a day.  ascorbic acid (VITAMIN C) 500 mg tablet Take 500 mg by mouth daily.        Allergies   Allergen Reactions    Rosuvastatin Myalgia     Generic rosuvastatin causes myalgias       Family History   Problem Relation Age of Onset    Heart Disease Father     Diabetes Mother     Heart Disease Paternal Grandfather     Cancer Maternal Grandmother         pancreatic     Social History     Tobacco Use    Smoking status: Former Smoker     Packs/day: 1.00     Years: 50.00     Pack years: 50.00     Quit date: 2012     Years since quittin.3    Smokeless tobacco: Never Used   Substance Use Topics    Alcohol use: Yes     Comment: usually 3 drinks week, but not as much with ino Valentine MD

## 2021-10-01 NOTE — ASSESSMENT & PLAN NOTE
Medication recently changed prior to last visit. Denies any current chest pain, shortness of breath, lightheadedness, dizziness. Well-controlled today.   Continue lisinopril 5 mg daily and metoprolol XL 12.5 daily

## 2021-10-01 NOTE — ASSESSMENT & PLAN NOTE
Stable.   Continue gabapentin 100 in the morning, 100 in the afternoon, 200 in the evening, no changes recommended

## 2021-10-01 NOTE — PATIENT INSTRUCTIONS
Medicare Wellness Visit, Male    The best way to live healthy is to have a lifestyle where you eat a well-balanced diet, exercise regularly, limit alcohol use, and quit all forms of tobacco/nicotine, if applicable. Regular preventive services are another way to keep healthy. Preventive services (vaccines, screening tests, monitoring & exams) can help personalize your care plan, which helps you manage your own care. Screening tests can find health problems at the earliest stages, when they are easiest to treat. [facility] follows the current, evidence-based guidelines published by the [facility] Dinesh Radha ([facility]) when recommending preventive services for our patients. Because we follow these guidelines, sometimes recommendations change over time as research supports it. (For example, a prostate screening blood test is no longer routinely recommended for men with no symptoms). Of course, you and your doctor may decide to screen more often for some diseases, based on your risk and co-morbidities (chronic disease you are already diagnosed with). Preventive services for you include:  - Medicare offers their members a free annual wellness visit, which is time for you and your primary care provider to discuss and plan for your preventive service needs. Take advantage of this benefit every year!  -All adults over age 72 should receive the recommended pneumonia vaccines. Current USPSTF guidelines recommend a series of two vaccines for the best pneumonia protection.   -All adults should have a flu vaccine yearly and tetanus vaccine every 10 years.  -All adults age 48 and older should receive the shingles vaccines (series of two vaccines).        -All adults age 38-68 who are overweight should have a diabetes screening test once every three years.   -Other screening tests & preventive services for persons with diabetes include: an eye exam to screen for diabetic retinopathy, a kidney function test, a foot exam, and stricter control over your cholesterol.   -Cardiovascular screening for adults with routine risk involves an electrocardiogram (ECG) at intervals determined by the provider.   -Colorectal cancer screening should be done for adults age 54-65 with no increased risk factors for colorectal cancer. There are a number of acceptable methods of screening for this type of cancer. Each test has its own benefits and drawbacks. Discuss with your provider what is most appropriate for you during your annual wellness visit. The different tests include: colonoscopy (considered the best screening method), a fecal occult blood test, a fecal DNA test, and sigmoidoscopy.  -All adults born between [facility] and [place] should be screened once for Hepatitis C.  -An Abdominal Aortic Aneurysm (AAA) Screening is recommended for men age 73-68 who has ever smoked in their lifetime.      Here is a list of your current Health Maintenance items (your personalized list of preventive services) with a due date:  Health Maintenance Due   Topic Date Due    Smoker or Former Smoker - Annual Lung Cancer Screen  05/30/2020

## 2021-10-06 RX ORDER — RAMELTEON 8 MG/1
8 TABLET ORAL
Qty: 30 TABLET | Refills: 1 | Status: SHIPPED | OUTPATIENT
Start: 2021-10-06 | End: 2022-09-27

## 2021-10-08 DIAGNOSIS — F41.9 ANXIETY: ICD-10-CM

## 2021-10-10 RX ORDER — ALPRAZOLAM 0.5 MG/1
TABLET ORAL
Qty: 30 TABLET | Refills: 1 | Status: SHIPPED | OUTPATIENT
Start: 2021-10-10 | End: 2022-04-10

## 2021-10-11 DIAGNOSIS — G62.9 NEUROPATHY: ICD-10-CM

## 2021-10-11 RX ORDER — GABAPENTIN 100 MG/1
CAPSULE ORAL
Qty: 360 CAPSULE | Refills: 1 | Status: SHIPPED | OUTPATIENT
Start: 2021-10-11 | End: 2022-04-11

## 2021-10-14 ENCOUNTER — HOSPITAL ENCOUNTER (OUTPATIENT)
Dept: CT IMAGING | Age: 76
Discharge: HOME | End: 2021-10-14
Attending: INTERNAL MEDICINE
Payer: MEDICARE

## 2021-10-14 VITALS — WEIGHT: 145 LBS | BODY MASS INDEX: 22.76 KG/M2 | HEIGHT: 67 IN

## 2021-10-14 DIAGNOSIS — Z87.891 PERSONAL HISTORY OF TOBACCO USE, PRESENTING HAZARDS TO HEALTH: ICD-10-CM

## 2021-10-14 PROCEDURE — 71271 CT THORAX LUNG CANCER SCR C-: CPT

## 2021-10-18 ENCOUNTER — TELEPHONE (OUTPATIENT)
Dept: INTERNAL MEDICINE CLINIC | Age: 76
End: 2021-10-18

## 2021-10-31 PROBLEM — Z00.00 MEDICARE ANNUAL WELLNESS VISIT, SUBSEQUENT: Status: RESOLVED | Noted: 2021-10-01 | Resolved: 2021-10-31

## 2021-11-16 DIAGNOSIS — D69.6 THROMBOCYTOPENIA (HCC): Primary | ICD-10-CM

## 2021-11-16 NOTE — PROGRESS NOTES
[id number] [facility]  [phone]    Hematology / Oncology Consult    Reason for Visit:   Licha Gallegos is a 67 y.o. male who is seen in consultation at the request of Dr. Naveed Vasquez for evaluation of thrombocytopenia. History of Present Illness:   Licha Gallegos is a 67 y.o. male who is seen for thrombocytopenia. Review of labs in our system reveals PLT count in range of  since at least 2013. The PLT count of 89 was reportedly at time of a febrile illness. No EtOH abuse or liver disease. No fevers, chills, sweats. No unintentional weight loss, but has \"lost 30-lbs\" intentionally; however review of VS in our system reveals 12-lb weight loss in past 5 months. He goes the gym frequently. No recurrent fevers, chills. No family h/o blood disorders. Interval History:  Pt reports some memory problems due to old age. He has trouble falling asleep, but states his PCP has discussed with him. No melena/hematochezia. He does report that his fingers get cold and turn colors when he exercises. PMHx: BPH, Carpal tunnel syndrome on Left, h/o MI, XOL, stable pulm nodules  PSurgHx: Cataract removal, CABG, bilat inguinal hernia repair, lumbar diskectomy, tonsillectomy, partial colectomy, TURP   SHx: Former smoker, quit 5/2012 after 50 pk yrs, EtOH 3 drinks/week  FHx: Father with heart disease, Mother with DM, Maternal GM with pancreas cancer. Review of Systems: A complete review of systems was obtained, negative except as described above. Physical Exam:   Blood pressure (!) 128/58, pulse 71, resp. rate 16, height 5' 7\" (1.702 m), weight 147 lb 3.2 oz (66.8 kg), SpO2 98 %.     ECOG PS: 0  General: Well developed, no acute distress  Eyes: PERRLA, EOMI, anicteric sclerae  HENT: Atraumatic, OP clear, TMs intact without erythema  Neck: Supple  Lymphatic: No cervical, supraclavicular, axillary adenopathy  Respiratory: CTAB, normal respiratory effort  CV: Normal rate, regular rhythm, no murmurs, no peripheral edema  GI: Soft, nontender, nondistended, no masses, no hepatomegaly, no splenomegaly  MS: Normal gait and station. Digits without clubbing or cyanosis. Skin: No rashes, ecchymoses, or petechiae. Normal temperature, turgor, and texture. Neuro/Psych: Alert, oriented. Moves all 4 extremities. Appropriate affect, normal judgment/insight. Results:     Lab Results   Component Value Date/Time    WBC 5.5 2021 10:45 AM    HGB 14.3 2021 10:45 AM    HCT 43.5 2021 10:45 AM    PLATELET 738 (L) 464 10:45 AM    .1 (H) 2021 10:45 AM    ABS. NEUTROPHILS 2.9 2021 10:45 AM     Lab Results   Component Value Date/Time    Sodium 141 2021 09:29 AM    Potassium 4.2 2021 09:29 AM    Chloride 106 2021 09:29 AM    CO2 30 2021 09:29 AM    Glucose 87 2021 09:29 AM    BUN 14 2021 09:29 AM    Creatinine 1.03 2021 09:29 AM    GFR est AA >60 2021 09:29 AM    GFR est non-AA >60 2021 09:29 AM    Calcium 9.6 2021 09:29 AM     Lab Results   Component Value Date/Time    Bilirubin, total 0.8 2021 09:29 AM    ALT (SGPT) 29 2021 09:29 AM    Alk. phosphatase 77 2021 09:29 AM    Protein, total 7.1 2021 09:29 AM    Protein, total 7.2 2021 09:29 AM    Albumin 4.4 2021 09:29 AM    Globulin 2.8 2021 09:29 AM     No results found for: IRON, FE, TIBC, IBCT, PSAT, FERR    Lab Results   Component Value Date/Time    Vitamin B12 1,604 (H) 2021 09:29 AM    Folate 63.4 (H) 2021 09:29 AM     Lab Results   Component Value Date/Time    TSH 2.14 2021 09:29 AM     Lab Results   Component Value Date/Time    Hepatitis B surface Ag <0.10 2021 12:33 PM    Hep B Core Ab, total Negative 2021 12:33 PM         Imagin/3/21 Abdomen ultrasound:  IMPRESSION  No ultrasonographic evidence of hepatosplenomegaly or other acute  abnormality.  Renal cysts and borderline 4.2 mm pancreatic duct dilation  incidentally noted. Assessment & Plan:   Russell Sparks is a 67 y.o. male with CAD, HTN, Aretta Carl who is seen for thrombocytopenia. 1. Thrombocytopenia:   Chronic since at least 2013. No B12, folate deficiency. Hep B panel, HCV, HIV negative. UPEP negative. No medication culprits. No B symptoms. No EtOH abuse or known liver disease. Given concurrent macrocytosis, it is possible that patient has MDS (Myelodysplastic syndrome). I discussed with patient that MDS can cause a sluggish bone marrow with low counts. However, it does not require treatment unless PLT count remains persistently below 80-90 range. Therefore, I recommend monitoring his PLT count periodically 2-3 times a year. I recommend delaying bone marrow biopsy until the future if needed. -- Return in 12 month with repeat CBC given stable counts. -- Can see me sooner if PLT drop < 100 or Hgb < 10.     2. CAD: s/p PCI and CABG. On ASA, Crestor, Toprol-XL, Niaspan. Recently was advised to decrease Toprol XL to 1/2 tab given bradycardia. Lisinopril added. 3. H/o colon cancer:   Surgical resection in 2001. No melena/hematochezia. 4.  Sinus bradycardia: 2/2 Toprol-XL and had recent dose decrease. 5. Macrocytosis without anemia:  No B12 or folate deficiency. Could be due to MDS. Will monitor. I appreciate the opportunity to participate in Mr. Sydney Barker's care.     Signed By: Rosas Fitch MD     November 16, 2021

## 2021-11-18 ENCOUNTER — OFFICE VISIT (OUTPATIENT)
Dept: ONCOLOGY | Age: 76
End: 2021-11-18
Payer: MEDICARE

## 2021-11-18 VITALS
SYSTOLIC BLOOD PRESSURE: 128 MMHG | RESPIRATION RATE: 16 BRPM | HEIGHT: 67 IN | DIASTOLIC BLOOD PRESSURE: 58 MMHG | WEIGHT: 147.2 LBS | BODY MASS INDEX: 23.1 KG/M2 | HEART RATE: 71 BPM | OXYGEN SATURATION: 98 %

## 2021-11-18 DIAGNOSIS — D69.6 THROMBOCYTOPENIA (HCC): Primary | ICD-10-CM

## 2021-11-18 DIAGNOSIS — R00.1 SINUS BRADYCARDIA: ICD-10-CM

## 2021-11-18 DIAGNOSIS — D75.89 MACROCYTOSIS WITHOUT ANEMIA: ICD-10-CM

## 2021-11-18 PROCEDURE — G0463 HOSPITAL OUTPT CLINIC VISIT: HCPCS | Performed by: INTERNAL MEDICINE

## 2021-11-18 PROCEDURE — G8754 DIAS BP LESS 90: HCPCS | Performed by: INTERNAL MEDICINE

## 2021-11-18 PROCEDURE — G8420 CALC BMI NORM PARAMETERS: HCPCS | Performed by: INTERNAL MEDICINE

## 2021-11-18 PROCEDURE — G8536 NO DOC ELDER MAL SCRN: HCPCS | Performed by: INTERNAL MEDICINE

## 2021-11-18 PROCEDURE — G8752 SYS BP LESS 140: HCPCS | Performed by: INTERNAL MEDICINE

## 2021-11-18 PROCEDURE — 99214 OFFICE O/P EST MOD 30 MIN: CPT | Performed by: INTERNAL MEDICINE

## 2021-11-18 PROCEDURE — 1101F PT FALLS ASSESS-DOCD LE1/YR: CPT | Performed by: INTERNAL MEDICINE

## 2021-11-18 PROCEDURE — G8510 SCR DEP NEG, NO PLAN REQD: HCPCS | Performed by: INTERNAL MEDICINE

## 2021-11-18 PROCEDURE — G8427 DOCREV CUR MEDS BY ELIG CLIN: HCPCS | Performed by: INTERNAL MEDICINE

## 2021-11-18 NOTE — PROGRESS NOTES
Chief Complaint   Patient presents with    Follow-up     Russell Sparks is a pleasant 67year old male who presents as a follow up for thrombocytopenia.  He denies pain

## 2022-03-18 PROBLEM — D69.6 THROMBOCYTOPENIA (HCC): Status: ACTIVE | Noted: 2019-05-20

## 2022-03-19 PROBLEM — D12.3 ADENOMATOUS POLYP OF TRANSVERSE COLON: Status: ACTIVE | Noted: 2019-05-20

## 2022-03-19 PROBLEM — Z72.0 TOBACCO USE: Status: ACTIVE | Noted: 2021-03-17

## 2022-03-19 PROBLEM — G62.9 NEUROPATHY: Status: ACTIVE | Noted: 2021-03-17

## 2022-03-20 PROBLEM — G47.00 INSOMNIA: Status: ACTIVE | Noted: 2019-05-20

## 2022-03-20 PROBLEM — K59.00 CONSTIPATION, UNSPECIFIED CONSTIPATION TYPE: Status: ACTIVE | Noted: 2021-03-17

## 2022-03-20 PROBLEM — F41.9 ANXIETY: Status: ACTIVE | Noted: 2021-03-17

## 2022-03-20 PROBLEM — R91.8 PULMONARY NODULES: Status: ACTIVE | Noted: 2019-05-01

## 2022-03-20 PROBLEM — Z85.038 HISTORY OF COLON CANCER: Status: ACTIVE | Noted: 2018-05-18

## 2022-04-10 DIAGNOSIS — E78.49 OTHER HYPERLIPIDEMIA: ICD-10-CM

## 2022-04-10 DIAGNOSIS — I25.10 ATHEROSCLEROSIS OF NATIVE CORONARY ARTERY OF NATIVE HEART WITHOUT ANGINA PECTORIS: ICD-10-CM

## 2022-04-10 DIAGNOSIS — F41.9 ANXIETY: ICD-10-CM

## 2022-04-10 RX ORDER — ALPRAZOLAM 0.5 MG/1
TABLET ORAL
Qty: 30 TABLET | Refills: 1 | Status: SHIPPED | OUTPATIENT
Start: 2022-04-10 | End: 2022-08-01 | Stop reason: SDUPTHER

## 2022-04-10 RX ORDER — ROSUVASTATIN CALCIUM 10 MG/1
TABLET, FILM COATED ORAL
Qty: 90 TABLET | Refills: 3 | Status: SHIPPED | OUTPATIENT
Start: 2022-04-10

## 2022-04-11 DIAGNOSIS — G62.9 NEUROPATHY: ICD-10-CM

## 2022-04-11 RX ORDER — GABAPENTIN 100 MG/1
CAPSULE ORAL
Qty: 360 CAPSULE | Refills: 0 | Status: SHIPPED | OUTPATIENT
Start: 2022-04-11 | End: 2022-08-01

## 2022-04-28 ENCOUNTER — OFFICE VISIT (OUTPATIENT)
Dept: CARDIOLOGY CLINIC | Age: 77
End: 2022-04-28
Payer: MEDICARE

## 2022-04-28 VITALS
SYSTOLIC BLOOD PRESSURE: 126 MMHG | DIASTOLIC BLOOD PRESSURE: 74 MMHG | HEIGHT: 67 IN | OXYGEN SATURATION: 96 % | HEART RATE: 56 BPM | BODY MASS INDEX: 22.88 KG/M2 | WEIGHT: 145.8 LBS

## 2022-04-28 DIAGNOSIS — I10 ESSENTIAL HYPERTENSION: ICD-10-CM

## 2022-04-28 DIAGNOSIS — E78.2 MIXED HYPERLIPIDEMIA: ICD-10-CM

## 2022-04-28 DIAGNOSIS — Z95.1 HX OF CABG: Primary | ICD-10-CM

## 2022-04-28 PROBLEM — I73.9 PERIPHERAL VASCULAR DISEASE, UNSPECIFIED (HCC): Status: ACTIVE | Noted: 2022-04-28

## 2022-04-28 PROCEDURE — G8432 DEP SCR NOT DOC, RNG: HCPCS | Performed by: INTERNAL MEDICINE

## 2022-04-28 PROCEDURE — G8536 NO DOC ELDER MAL SCRN: HCPCS | Performed by: INTERNAL MEDICINE

## 2022-04-28 PROCEDURE — G8752 SYS BP LESS 140: HCPCS | Performed by: INTERNAL MEDICINE

## 2022-04-28 PROCEDURE — 1101F PT FALLS ASSESS-DOCD LE1/YR: CPT | Performed by: INTERNAL MEDICINE

## 2022-04-28 PROCEDURE — G8427 DOCREV CUR MEDS BY ELIG CLIN: HCPCS | Performed by: INTERNAL MEDICINE

## 2022-04-28 PROCEDURE — G0463 HOSPITAL OUTPT CLINIC VISIT: HCPCS | Performed by: INTERNAL MEDICINE

## 2022-04-28 PROCEDURE — G8420 CALC BMI NORM PARAMETERS: HCPCS | Performed by: INTERNAL MEDICINE

## 2022-04-28 PROCEDURE — 99214 OFFICE O/P EST MOD 30 MIN: CPT | Performed by: INTERNAL MEDICINE

## 2022-04-28 PROCEDURE — G8754 DIAS BP LESS 90: HCPCS | Performed by: INTERNAL MEDICINE

## 2022-04-28 NOTE — PROGRESS NOTES
Office Follow-up    NAME: Princess Morales   :  1945  MRM:  [id number]    Date:  2022            Assessment:     Problem List  Date Reviewed: 2021          Codes Class Noted    Carpal tunnel syndrome on left ICD-10-CM: G56.02  ICD-9-CM: 354.0  Unknown    Overview Signed 3/17/2021  5:11 PM by Suzie Rivas MD     s/p repair             Hx of CABG ICD-10-CM: Z95.1  ICD-9-CM: V45.81  Unknown        Anxiety ICD-10-CM: F41.9  ICD-9-CM: 300.00  3/17/2021        Neuropathy ICD-10-CM: G62.9  ICD-9-CM: 355.9  3/17/2021        Constipation, unspecified constipation type ICD-10-CM: K59.00  ICD-9-CM: 564.00  3/17/2021        Tobacco use ICD-10-CM: Z72.0  ICD-9-CM: 305.1  3/17/2021        Essential hypertension ICD-10-CM: I10  ICD-9-CM: 401.9  Unknown        History of myocardial infarction ICD-10-CM: I25.2  ICD-9-CM: 423  Unknown        Hyperlipidemia ICD-10-CM: E78.5  ICD-9-CM: 272.4  Unknown        Tachycardia ICD-10-CM: R00.0  ICD-9-CM: 785.0  Unknown        BPH with obstruction/lower urinary tract symptoms ICD-10-CM: N40.1, N13.8  ICD-9-CM: 600.01, 599.69  Unknown    Overview Signed 2019 11:08 AM by Dixon Sheppard MD     s/p TURP 2008 with worsenintg s/s             Cognitive changes ICD-10-CM: R41.89  ICD-9-CM: 799.59  Unknown        Pulmonary nodules ICD-10-CM: R91.8  ICD-9-CM: 793.19  2019    Overview Signed 2020  4:05 PM by Dixon Sheppard MD     stable 2020 3 mm nodule RML, 3 mm nodule LLL             Thrombocytopenia ([address] ICD-10-CM: D69.6  ICD-9-CM: 287.5  2019        Adenomatous polyp of transverse colon ICD-10-CM: D12.3  ICD-9-CM: 211.3  2019        Insomnia ICD-10-CM: G47.00  ICD-9-CM: 780.52  2019    Overview Addendum 2021 15:64 AM by Suzie Rivas MD     Previous medications: Trazodone, melatonin, Ambien, lunesta (ineffective)             History of colon cancer ICD-10-CM: Z85.038  ICD-9-CM: V10.05  2018    Overview Addendum 10/1/2021  1:16 PM by Shayla Reddy MD     5/2021 colonoscopy done; 2001, status post hemicolectomy, unknown stage, had 2 rounds of radiation but did not tolerate so stopped. No chemo. Removed 3 feet. No further issues             History of benign spinal cord tumor ICD-10-CM: Z86.018  ICD-9-CM: V12.49  2013    Overview Signed 3/17/2021  2:58 PM by MD Dr. Sheila Saenz surgery 6/2018.   MRI 5/2018 Densely enhancing intraspinal tumor as described from L4 to the first sacral             Atherosclerosis of native coronary artery of native heart without angina pectoris ICD-10-CM: I25.10  ICD-9-CM: 414.01  1/1/1993    Overview Addendum 3/17/2021  6:26 PM by Shayla Reddy MD     S/p multiple MI's, stents and eventually CABG                      Plan:       1. Known history of CAD, PCI, CABG: Appears to be stable. Continue Aspirin, Crestor, Toprol-XL, Niaspan. 2.  Hypertension:Controlled. Continue Toprol-XL and Lisinopril. 3.  Dyslipidemia:  Continue Crestor, Omega 3 and Niaspan. 4.  Bradycardia: Improved after reducing the  Toprol XL and now only mildly bradycardic. 5. See Dr. Rita Michael in 1 year. ATTENTION:   This medical record was transcribed using an electronic medical records/speech recognition system. Although proofread, it may and can contain electronic, spelling and other errors. Corrections may be executed at a later time. Please feel free to contact us for any clarifications as needed. Subjective:     Marry Lundborg, a 67y.o. year-old who presents for followup. He has known history of CAD, PCI, CABG, hypertension dyslipidemia. His CABG was performed in 1999 and [address] [place] [address].  He has stents prior to that. He is returning today for follow-up. Denies any symptoms of chest pain, shortness of breath, lightheadedness or dizziness. He is active and goes to the gym. Has noticed easy bruising.        Exam:     Physical Exam:  Visit Vitals  /74 (BP 1 Location: Left upper arm, BP Patient Position: Sitting)   Pulse (!) 56   Ht 5' 7\" (1.702 m)   Wt 145 lb 12.8 oz (66.1 kg)   SpO2 96%   BMI 22.84 kg/m²     General appearance - alert, well appearing, and in no distress  Mental status - affect appropriate to mood  Eyes - sclera anicteric, moist mucous membranes  Neck - supple, no significant adenopathy  Chest - clear to auscultation, no wheezes, rales or rhonchi  Heart - normal rate, regular rhythm, normal S1, S2, no murmurs, rubs, clicks or gallops  Abdomen - soft, nontender, nondistended, no masses or organomegaly  Extremities - peripheral pulses normal, no pedal edema  Skin - normal coloration  no rashes    Medications:     Current Outpatient Medications   Medication Sig    gabapentin (NEURONTIN) 100 mg capsule TAKE 1 CAPSULE IN THE      MORNING, 1 CAPSULE AT NOON,AND 2 CAPSULES IN THE      EVENING (Patient taking differently: 400 mg. One capsule in the morning and one with dinner)    Crestor 10 mg tablet TAKE 1 TABLET NIGHTLY    ALPRAZolam (XANAX) 0.5 mg tablet TAKE 1 TABLET NIGHTLY AS   NEEDED FOR ANXIETY. MAXIMUMDAILY AMOUNT: 0.5 MG    lisinopriL (PRINIVIL, ZESTRIL) 5 mg tablet Take 1 Tablet by mouth daily.  metoprolol succinate (TOPROL-XL) 25 mg XL tablet Take 0.5 Tablets by mouth daily. Indications: ventricular rate control in atrial fibrillation    niacin (NIASPAN) 1,000 mg Tb24 tab TAKE 2 TABLETS NIGHTLY    polyethylene glycol (Miralax) 17 gram/dose powder Take 17 g by mouth daily. 1/2 as needed    Aspirin, Buffered 81 mg tab Take 81 mg by mouth nightly.  omega-3 fatty acids-vitamin e (FISH OIL) 1,000 mg cap Take 1 Cap by mouth two (2) times a day.  ascorbic acid (VITAMIN C) 500 mg tablet Take 500 mg by mouth daily.  ramelteon (ROZEREM) 8 mg tablet Take 1 Tablet by mouth nightly as needed for Sleep.  lemborexant (Dayvigo) 5 mg tab Take 5 mg by mouth daily. Max Daily Amount: 5 mg.      No current facility-administered medications for this visit. Diagnostic Data Review:       No specialty comments available. Lab Review:     Lab Results   Component Value Date/Time    Cholesterol, total 111 03/17/2021 09:29 AM    HDL Cholesterol 43 03/17/2021 09:29 AM    LDL, calculated 49.6 03/17/2021 09:29 AM    Triglyceride 92 03/17/2021 09:29 AM    CHOL/HDL Ratio 2.6 03/17/2021 09:29 AM     Lab Results   Component Value Date/Time    Creatinine 1.03 03/17/2021 09:29 AM     Lab Results   Component Value Date/Time    BUN 14 03/17/2021 09:29 AM     Lab Results   Component Value Date/Time    Potassium 4.2 03/17/2021 09:29 AM     Lab Results   Component Value Date/Time    Hemoglobin A1c 5.6 03/17/2021 09:29 AM     Lab Results   Component Value Date/Time    HGB 14.3 11/16/2021 10:45 AM     Lab Results   Component Value Date/Time    PLATELET 555 (L) 85/82/7919 10:45 AM     No results for input(s): CPK, CKMB, TROIQ in the last 72 hours. No lab exists for component: CKQMB, CPKMB             ___________________________________________________    Yolande Hang.  Louie Martines MD, [facility] [place]

## 2022-04-28 NOTE — PROGRESS NOTES
Leigh Day is a 67 y.o. male    Chief Complaint   Patient presents with    Follow-up     annaul, tachy    Hypertension    Cholesterol Problem     Has question about blood thinners; and working out    Chest pain No    SOB No    Dizziness No    Swelling No    Refills No    Visit Vitals  /74 (BP 1 Location: Left upper arm, BP Patient Position: Sitting)   Pulse (!) 56   Ht 5' 7\" (1.702 m)   Wt 145 lb 12.8 oz (66.1 kg)   SpO2 96%   BMI 22.84 kg/m²       1. Have you been to the ER, urgent care clinic since your last visit? Hospitalized since your last visit? No    2. Have you seen or consulted any other health care providers outside of the [address] [place] [address] since your last visit? Include any pap smears or colon screening.   No

## 2022-07-11 ENCOUNTER — PATIENT MESSAGE (OUTPATIENT)
Dept: INTERNAL MEDICINE CLINIC | Age: 77
End: 2022-07-11

## 2022-07-11 DIAGNOSIS — I25.10 ATHEROSCLEROSIS OF NATIVE CORONARY ARTERY OF NATIVE HEART WITHOUT ANGINA PECTORIS: ICD-10-CM

## 2022-07-12 DIAGNOSIS — I25.10 ATHEROSCLEROSIS OF NATIVE CORONARY ARTERY OF NATIVE HEART WITHOUT ANGINA PECTORIS: ICD-10-CM

## 2022-07-12 RX ORDER — METOPROLOL SUCCINATE 25 MG/1
12.5 TABLET, EXTENDED RELEASE ORAL DAILY
Qty: 45 TABLET | Refills: 3 | Status: SHIPPED | OUTPATIENT
Start: 2022-07-12 | End: 2022-07-12 | Stop reason: SDUPTHER

## 2022-07-12 RX ORDER — METOPROLOL SUCCINATE 25 MG/1
12.5 TABLET, EXTENDED RELEASE ORAL DAILY
Qty: 45 TABLET | Refills: 3 | Status: SHIPPED | OUTPATIENT
Start: 2022-07-12 | End: 2022-09-27

## 2022-07-12 NOTE — TELEPHONE ENCOUNTER
Pt is requesting this medication. Pt requested this medication to go to [facility]/pharmacy on [address]. Pt states he already spoke with the nurse and told her it needs to go to [facility] but pt said we sent it to [facility]. Pt is going out of town and needs it by today.

## 2022-07-12 NOTE — TELEPHONE ENCOUNTER
From: Abhinav Barker  To: Aries Donaldson MD  Sent: 5/80/3906 8:31 PM EDT  Subject: Prescription    Dr. Mancera Glad,    I need a refill of the Metoprolol. I am out - long story which I would be glad to share at my appointment on Aug 1. The pharmacist, at [address] [place] [address]. [facility] said he has placed two calls without a response. If you are uncomfortable prescribing the regular 90 supply, just allow him to fill enough to get me to August 2nd.     Justin Barker

## 2022-08-01 ENCOUNTER — TELEPHONE (OUTPATIENT)
Dept: INTERNAL MEDICINE CLINIC | Age: 77
End: 2022-08-01

## 2022-08-01 ENCOUNTER — OFFICE VISIT (OUTPATIENT)
Dept: INTERNAL MEDICINE CLINIC | Age: 77
End: 2022-08-01
Payer: MEDICARE

## 2022-08-01 VITALS
DIASTOLIC BLOOD PRESSURE: 73 MMHG | RESPIRATION RATE: 14 BRPM | OXYGEN SATURATION: 97 % | HEIGHT: 67 IN | WEIGHT: 143 LBS | HEART RATE: 58 BPM | TEMPERATURE: 97 F | SYSTOLIC BLOOD PRESSURE: 140 MMHG | BODY MASS INDEX: 22.44 KG/M2

## 2022-08-01 DIAGNOSIS — G47.09 OTHER INSOMNIA: ICD-10-CM

## 2022-08-01 DIAGNOSIS — I10 ESSENTIAL HYPERTENSION: ICD-10-CM

## 2022-08-01 DIAGNOSIS — R79.9 ABNORMAL FINDING OF BLOOD CHEMISTRY, UNSPECIFIED: ICD-10-CM

## 2022-08-01 DIAGNOSIS — Z00.00 MEDICARE ANNUAL WELLNESS VISIT, SUBSEQUENT: Primary | ICD-10-CM

## 2022-08-01 DIAGNOSIS — F41.9 ANXIETY: ICD-10-CM

## 2022-08-01 DIAGNOSIS — R35.1 NOCTURIA: ICD-10-CM

## 2022-08-01 DIAGNOSIS — E78.49 OTHER HYPERLIPIDEMIA: ICD-10-CM

## 2022-08-01 DIAGNOSIS — D69.6 THROMBOCYTOPENIA (HCC): ICD-10-CM

## 2022-08-01 DIAGNOSIS — I25.10 ATHEROSCLEROSIS OF NATIVE CORONARY ARTERY OF NATIVE HEART WITHOUT ANGINA PECTORIS: ICD-10-CM

## 2022-08-01 DIAGNOSIS — G62.9 NEUROPATHY: ICD-10-CM

## 2022-08-01 DIAGNOSIS — Z12.5 PROSTATE CANCER SCREENING: ICD-10-CM

## 2022-08-01 DIAGNOSIS — K59.00 CONSTIPATION, UNSPECIFIED CONSTIPATION TYPE: ICD-10-CM

## 2022-08-01 DIAGNOSIS — Z87.891 PERSONAL HISTORY OF TOBACCO USE, PRESENTING HAZARDS TO HEALTH: ICD-10-CM

## 2022-08-01 DIAGNOSIS — I73.9 PERIPHERAL VASCULAR DISEASE, UNSPECIFIED (HCC): ICD-10-CM

## 2022-08-01 PROCEDURE — G8753 SYS BP > OR = 140: HCPCS | Performed by: INTERNAL MEDICINE

## 2022-08-01 PROCEDURE — G0296 VISIT TO DETERM LDCT ELIG: HCPCS | Performed by: INTERNAL MEDICINE

## 2022-08-01 PROCEDURE — 1101F PT FALLS ASSESS-DOCD LE1/YR: CPT | Performed by: INTERNAL MEDICINE

## 2022-08-01 PROCEDURE — G8427 DOCREV CUR MEDS BY ELIG CLIN: HCPCS | Performed by: INTERNAL MEDICINE

## 2022-08-01 PROCEDURE — G0463 HOSPITAL OUTPT CLINIC VISIT: HCPCS | Performed by: INTERNAL MEDICINE

## 2022-08-01 PROCEDURE — G8510 SCR DEP NEG, NO PLAN REQD: HCPCS | Performed by: INTERNAL MEDICINE

## 2022-08-01 PROCEDURE — G8420 CALC BMI NORM PARAMETERS: HCPCS | Performed by: INTERNAL MEDICINE

## 2022-08-01 PROCEDURE — G8536 NO DOC ELDER MAL SCRN: HCPCS | Performed by: INTERNAL MEDICINE

## 2022-08-01 PROCEDURE — G8754 DIAS BP LESS 90: HCPCS | Performed by: INTERNAL MEDICINE

## 2022-08-01 PROCEDURE — G0439 PPPS, SUBSEQ VISIT: HCPCS | Performed by: INTERNAL MEDICINE

## 2022-08-01 PROCEDURE — 99214 OFFICE O/P EST MOD 30 MIN: CPT | Performed by: INTERNAL MEDICINE

## 2022-08-01 RX ORDER — LISINOPRIL 5 MG/1
5 TABLET ORAL DAILY
Qty: 90 TABLET | Refills: 3 | Status: SHIPPED | OUTPATIENT
Start: 2022-08-01 | End: 2022-09-27

## 2022-08-01 RX ORDER — ALPRAZOLAM 0.5 MG/1
TABLET ORAL
Qty: 90 TABLET | Refills: 1 | Status: SHIPPED | OUTPATIENT
Start: 2022-08-01

## 2022-08-01 RX ORDER — NIACIN 1000 MG/1
TABLET, EXTENDED RELEASE ORAL
Qty: 180 TABLET | Refills: 3 | Status: SHIPPED | OUTPATIENT
Start: 2022-08-01

## 2022-08-01 NOTE — PATIENT INSTRUCTIONS
Magnesium 400mg daily - Nature made    Praxair Visit, Male    The best way to live healthy is to have a lifestyle where you eat a well-balanced diet, exercise regularly, limit alcohol use, and quit all forms of tobacco/nicotine, if applicable. Regular preventive services are another way to keep healthy. Preventive services (vaccines, screening tests, monitoring & exams) can help personalize your care plan, which helps you manage your own care. Screening tests can find health problems at the earliest stages, when they are easiest to treat. [facility] follows the current, evidence-based guidelines published by the [facility] Dinesh Radha ([facility]) when recommending preventive services for our patients. Because we follow these guidelines, sometimes recommendations change over time as research supports it. (For example, a prostate screening blood test is no longer routinely recommended for men with no symptoms). Of course, you and your doctor may decide to screen more often for some diseases, based on your risk and co-morbidities (chronic disease you are already diagnosed with). Preventive services for you include:  - Medicare offers their members a free annual wellness visit, which is time for you and your primary care provider to discuss and plan for your preventive service needs. Take advantage of this benefit every year!  -All adults over age 72 should receive the recommended pneumonia vaccines. Current USPSTF guidelines recommend a series of two vaccines for the best pneumonia protection.   -All adults should have a flu vaccine yearly and tetanus vaccine every 10 years.  -All adults age 48 and older should receive the shingles vaccines (series of two vaccines).        -All adults age 38-68 who are overweight should have a diabetes screening test once every three years.   -Other screening tests & preventive services for persons with diabetes include: an eye exam to screen for diabetic retinopathy, a kidney function test, a foot exam, and stricter control over your cholesterol.   -Cardiovascular screening for adults with routine risk involves an electrocardiogram (ECG) at intervals determined by the provider.   -Colorectal cancer screening should be done for adults age 54-65 with no increased risk factors for colorectal cancer. There are a number of acceptable methods of screening for this type of cancer. Each test has its own benefits and drawbacks. Discuss with your provider what is most appropriate for you during your annual wellness visit. The different tests include: colonoscopy (considered the best screening method), a fecal occult blood test, a fecal DNA test, and sigmoidoscopy.  -All adults born between [facility] and [facility] should be screened once for Hepatitis C.  -An Abdominal Aortic Aneurysm (AAA) Screening is recommended for men age 73-68 who has ever smoked in their lifetime.      Here is a list of your current Health Maintenance items (your personalized list of preventive services) with a due date:  Health Maintenance Due   Topic Date Due    Cholesterol Test   03/17/2022

## 2022-08-01 NOTE — ASSESSMENT & PLAN NOTE
Slightly elevated today but usually runs 120s at home  Usually better controlled at home.   Continue lisinopril 5, Toprol-XL 12.5 daily

## 2022-08-01 NOTE — ASSESSMENT & PLAN NOTE
Not currently on gabapentin  Was started for neuropathy but doesn't seem to be doing anything  Stopped gabapentin for July - didn't notice any difference  Monitor off gabapentin

## 2022-08-01 NOTE — PROGRESS NOTES
Assessment and Plan     1. Medicare annual wellness visit, subsequent  Assessment & Plan:  Does have ACP paperwork  Lung cancer screening ordered  Encourage healthy habits  Reports noting that he has been having some color changes in his fingers when working out-he will send a message to us with the pictures  2. Anxiety  Assessment & Plan:   well controlled, continue current medications   Xanax 0.4mg prn  Orders:  -     ALPRAZolam (XANAX) 0.5 mg tablet; TAKE 1 TABLET NIGHTLY AS   NEEDED FOR ANXIETY. MAXIMUMDAILY AMOUNT: 0.5 MG, Normal, Disp-90 Tablet, R-1  3. Peripheral vascular disease, unspecified ([facility] [address]  Assessment & Plan:  Continue aspirin, statin  4. Thrombocytopenia ([facility]  Assessment & Plan:  Monitor every 4-6 months  Orders:  -     METABOLIC PANEL, COMPREHENSIVE; Future  -     CBC WITH AUTOMATED DIFF; Future  5. Essential hypertension  Assessment & Plan:  Slightly elevated today but usually runs 120s at home  Usually better controlled at home. Continue lisinopril 5, Toprol-XL 12.5 daily  Orders:  -     lisinopriL (PRINIVIL, ZESTRIL) 5 mg tablet; Take 1 Tablet by mouth in the morning. Indications: high blood pressure, Normal, Disp-90 Tablet, R-3  6. Other hyperlipidemia  Assessment & Plan:   unclear control, continue current medications pending work up below   Crestor 10, niacin, fish  Orders:  -     niacin (NIASPAN) 1,000 mg Tb24 tab; TAKE 2 TABLETS NIGHTLY, Normal, Disp-180 Tablet, R-3  -     HEMOGLOBIN A1C WITH EAG; Future  -     LIPID PANEL; Future  7. Atherosclerosis of native coronary artery of native heart without angina pectoris  -     niacin (NIASPAN) 1,000 mg Tb24 tab; TAKE 2 TABLETS NIGHTLY, Normal, Disp-180 Tablet, R-3  8. Prostate cancer screening  -     PSA W/ REFLX FREE PSA; Future  9. Abnormal finding of blood chemistry, unspecified   -     HEMOGLOBIN A1C WITH EAG; Future  10. Nocturia   -     PSA W/ REFLX FREE PSA; Future  11.  Personal history of tobacco use, presenting hazards to health  - CT LOW DOSE LUNG CANCER SCREENING; Future  12. Other insomnia  Assessment & Plan:   Ramelteon very strong - makes him groggy   Takes it only seldomnly   Cont ramelteon 8mg prn  Previous medications: Trazodone, melatonin, Ambien, lunesta (ineffective); dayvigo (not covered)  13. Neuropathy  Assessment & Plan:   Not currently on gabapentin  Was started for neuropathy but doesn't seem to be doing anything  Stopped gabapentin for July - didn't notice any difference  Monitor off gabapentin  14. Constipation, unspecified constipation type  Assessment & Plan:   well controlled, continue current medications   Uses miralax daily       Benefits, risks, possible drug interactions, and side effects of all new medications were reviewed with the patient. Pt verbalized understanding. Return to clinic: 1 year for physical or earlier if needed    An electronic signature was used to authenticate this note. Vlad Jeffrey MD  [facility]  8/1/2022    Future Appointments   Date Time Provider Prakash Mckinley   11/10/2022  9:30 AM Clyde Mccormick MD [facility] BS AMB   5/5/2023  9:00 AM Rosanne Villa MD [facility] BS AMB        History of Present Illness   Chief Complaint   Medicare wellness    Lukasz Reyna is a 67 y.o. male         Review of Systems   Constitutional:  Negative for chills and fever. HENT:  Negative for hearing loss. Eyes:  Negative for blurred vision. Respiratory:  Negative for shortness of breath. Cardiovascular:  Negative for chest pain. Gastrointestinal:  Negative for abdominal pain, blood in stool, constipation, diarrhea, melena, nausea and vomiting. Genitourinary:  Negative for dysuria and hematuria. Musculoskeletal:  Negative for joint pain. Skin:  Negative for rash. Neurological:  Negative for headaches.       Past Medical History     Allergies   Allergen Reactions    Rosuvastatin Myalgia     Generic rosuvastatin causes myalgias        Current Outpatient Medications   Medication Sig    ALPRAZolam (XANAX) 0.5 mg tablet TAKE 1 TABLET NIGHTLY AS   NEEDED FOR ANXIETY. MAXIMUMDAILY AMOUNT: 0.5 MG    lisinopriL (PRINIVIL, ZESTRIL) 5 mg tablet Take 1 Tablet by mouth in the morning. Indications: high blood pressure    niacin (NIASPAN) 1,000 mg Tb24 tab TAKE 2 TABLETS NIGHTLY    metoprolol succinate (TOPROL-XL) 25 mg XL tablet Take 0.5 Tablets by mouth daily. Indications: ventricular rate control in atrial fibrillation    Crestor 10 mg tablet TAKE 1 TABLET NIGHTLY    ramelteon (ROZEREM) 8 mg tablet Take 1 Tablet by mouth nightly as needed for Sleep.    polyethylene glycol (MIRALAX) 17 gram/dose powder Take 17 g by mouth daily. 1/2 as needed    Aspirin, Buffered 81 mg tab Take 81 mg by mouth nightly. omega-3 fatty acids-vitamin e 1,000 mg cap Take 1 Cap by mouth two (2) times a day. ascorbic acid, vitamin C, (VITAMIN C) 500 mg tablet Take 500 mg by mouth daily. No current facility-administered medications for this visit.           Patient Active Problem List   Diagnosis Code    History of colon cancer Z85.038    Thrombocytopenia (HCC) D69.6    Adenomatous polyp of transverse colon D12.3    Insomnia G47.00    Atherosclerosis of native coronary artery of native heart without angina pectoris I25.10    Essential hypertension I10    History of myocardial infarction I25.2    Hyperlipidemia E78.5    Tachycardia R00.0    BPH with obstruction/lower urinary tract symptoms N40.1, N13.8    Cognitive changes R41.89    Pulmonary nodules R91.8    History of benign spinal cord tumor Z86.018    Carpal tunnel syndrome on left G56.02    Hx of CABG Z95.1    Anxiety F41.9    Neuropathy G62.9    Constipation, unspecified constipation type K59.00    Tobacco use Z72.0    Medicare annual wellness visit, subsequent Z00.00    Peripheral vascular disease, unspecified ([facility] [address] I73.9     Past Surgical History:   Procedure Laterality Date    COLONOSCOPY N/A 5/9/2019    COLONOSCOPY performed by Damian De Leon MD at [address] 1999    caused chronic pain L arm from nerve block    HX CATARACT REMOVAL Bilateral     HX CORONARY ARTERY BYPASS GRAFT  1999    cabg x 3    HX HEART CATHETERIZATION      multiple stents    HX HERNIA REPAIR      bilateral inguinal    HX LUMBAR DISKECTOMY  06/2018    Dr Sujey Maldonado. s/p spinal tumor resection    HX TONSILLECTOMY      HX TOTAL COLECTOMY  2001    partial    HX TURP  2008      Social History     Tobacco Use    Smoking status: Former     Packs/day: 1.00     Years: 50.00     Pack years: 50.00     Types: Cigarettes     Quit date: 5/18/2012     Years since quitting: 10.2    Smokeless tobacco: Never   Substance Use Topics    Alcohol use: Yes     Comment: usually 3 drinks week, but not as much with covid      Family History   Problem Relation Age of Onset    Heart Disease Father     Diabetes Mother     Heart Disease Paternal Grandfather     Cancer Maternal Grandmother         pancreatic        Physical Exam   Vitals:       Visit Vitals  BP (!) 140/73 (BP 1 Location: Left upper arm, BP Patient Position: Sitting, BP Cuff Size: Adult)   Pulse (!) 58   Temp 97 °F (36.1 °C) (Oral)   Resp 14   Ht 5' 7\" (1.702 m)   Wt 143 lb (64.9 kg)   SpO2 97%   BMI 22.40 kg/m²        Physical Exam  Constitutional:       General: He is not in acute distress. Appearance: He is well-developed. HENT:      Right Ear: Tympanic membrane, ear canal and external ear normal.      Left Ear: Tympanic membrane, ear canal and external ear normal.   Eyes:      Extraocular Movements: Extraocular movements intact. Conjunctiva/sclera: Conjunctivae normal.   Cardiovascular:      Rate and Rhythm: Normal rate and regular rhythm. Pulses: Normal pulses. Heart sounds: No murmur heard. No friction rub. No gallop. Comments: 2+ radial pulses  Pulmonary:      Effort: No respiratory distress. Breath sounds: No wheezing, rhonchi or rales.    Abdominal: General: Bowel sounds are normal. There is no distension. Palpations: Abdomen is soft. There is no hepatomegaly, splenomegaly or mass. Tenderness: There is no abdominal tenderness. There is no guarding. Musculoskeletal:      Cervical back: Neck supple. Right lower leg: No edema. Left lower leg: No edema. Lymphadenopathy:      Cervical: No cervical adenopathy. Skin:     General: Skin is warm. Findings: No rash. Neurological:      Mental Status: He is alert. This is the Subsequent Medicare Annual Wellness Exam, performed 12 months or more after the Initial AWV or the last Subsequent AWV    I have reviewed the patient's medical history in detail and updated the computerized patient record. Assessment/Plan   Education and counseling provided:  Are appropriate based on today's review and evaluation    1. Medicare annual wellness visit, subsequent  Assessment & Plan:  Does have ACP paperwork  Lung cancer screening ordered  Encourage healthy habits  Reports noting that he has been having some color changes in his fingers when working out-he will send a message to us with the pictures  2. Anxiety  Assessment & Plan:   well controlled, continue current medications   Xanax 0.4mg prn  Orders:  -     ALPRAZolam (XANAX) 0.5 mg tablet; TAKE 1 TABLET NIGHTLY AS   NEEDED FOR ANXIETY. MAXIMUMDAILY AMOUNT: 0.5 MG, Normal, Disp-90 Tablet, R-1  3. Peripheral vascular disease, unspecified ([address]  Assessment & Plan:  Continue aspirin, statin  4. Thrombocytopenia ([address]  Assessment & Plan:  Monitor every 4-6 months  Orders:  -     METABOLIC PANEL, COMPREHENSIVE; Future  -     CBC WITH AUTOMATED DIFF; Future  5. Essential hypertension  Assessment & Plan:  Slightly elevated today but usually runs 120s at home  Usually better controlled at home. Continue lisinopril 5, Toprol-XL 12.5 daily  Orders:  -     lisinopriL (PRINIVIL, ZESTRIL) 5 mg tablet; Take 1 Tablet by mouth in the morning. Indications: high blood pressure, Normal, Disp-90 Tablet, R-3  6. Other hyperlipidemia  Assessment & Plan:   unclear control, continue current medications pending work up below   Crestor 10, niacin, fish  Orders:  -     niacin (NIASPAN) 1,000 mg Tb24 tab; TAKE 2 TABLETS NIGHTLY, Normal, Disp-180 Tablet, R-3  -     HEMOGLOBIN A1C WITH EAG; Future  -     LIPID PANEL; Future  7. Atherosclerosis of native coronary artery of native heart without angina pectoris  -     niacin (NIASPAN) 1,000 mg Tb24 tab; TAKE 2 TABLETS NIGHTLY, Normal, Disp-180 Tablet, R-3  8. Prostate cancer screening  -     PSA W/ REFLX FREE PSA; Future  9. Abnormal finding of blood chemistry, unspecified   -     HEMOGLOBIN A1C WITH EAG; Future  10. Nocturia   -     PSA W/ REFLX FREE PSA; Future  11. Personal history of tobacco use, presenting hazards to health  -     CT LOW DOSE LUNG CANCER SCREENING; Future  12. Other insomnia  Assessment & Plan:   Ramelteon very strong - makes him groggy   Takes it only seldomnly   Cont ramelteon 8mg prn  Previous medications: Trazodone, melatonin, Ambien, lunesta (ineffective); dayvigo (not covered)  13. Neuropathy  Assessment & Plan:   Not currently on gabapentin  Was started for neuropathy but doesn't seem to be doing anything  Stopped gabapentin for July - didn't notice any difference  Monitor off gabapentin  14.  Constipation, unspecified constipation type  Assessment & Plan:   well controlled, continue current medications   Uses miralax daily      Depression Risk Factor Screening     3 most recent PHQ Screens 8/1/2022   Little interest or pleasure in doing things Not at all   Feeling down, depressed, irritable, or hopeless Not at all   Total Score PHQ 2 0       Alcohol & Drug Abuse Risk Screen   Do you average more than 1 drink per night or more than 7 drinks a week?: (P) No  In the past three months have you had more than 4 drinks containing alcohol on one occasion?: (P) No          Functional Ability and Level of Safety   Hearing:  Hearing: (P) Patient reports hearing is good    Activities of Daily Living: The home contains: (P) no safety equipment, handrails, rugs  Functional ADLs: (P) Patient does total self care   Ambulation:  Patient ambulates: (P) with no difficulty   Fall Risk:  Fall Risk Assessment, last 12 mths 4/28/2022   Able to walk? Yes   Fall in past 12 months? 0   Do you feel unsteady? 0   Are you worried about falling 0   Is TUG test greater than 12 seconds? -   Is the gait abnormal? -   Number of falls in past 12 months -   Fall with injury?  -     Abuse Screen:  Do you ever feel afraid of your partner?: (P) No  Are you in a relationship with someone who physically or mentally threatens you?: (P) No  Is it safe for you to go home?: (P) Yes      Cognitive Screening   Has your family/caregiver stated any concerns about your memory?: (P) No       Health Maintenance Due     Health Maintenance Due   Topic Date Due    Lipid Screen  03/17/2022       Patient Care Team   Patient Care Team:  Maritza Gonzalez MD as PCP - General (Internal Medicine Physician)  Maritza Gonzalez MD as PCP - [facility] Provider  Selma Sparks MD as Physician (Cardiovascular Disease Physician)    History     Patient Active Problem List   Diagnosis Code    History of colon cancer Z85.038    Thrombocytopenia ([facility] [address] D69.6    Adenomatous polyp of transverse colon D12.3    Insomnia G47.00    Atherosclerosis of native coronary artery of native heart without angina pectoris I25.10    Essential hypertension I10    History of myocardial infarction I25.2    Hyperlipidemia E78.5    Tachycardia R00.0    BPH with obstruction/lower urinary tract symptoms N40.1, N13.8    Cognitive changes R41.89    Pulmonary nodules R91.8    History of benign spinal cord tumor Z86.018    Carpal tunnel syndrome on left G56.02    Hx of CABG Z95.1    Anxiety F41.9    Neuropathy G62.9    Constipation, unspecified constipation type K59.00    Tobacco use Z72.0    Medicare annual wellness visit, subsequent Z00.00    Peripheral vascular disease, unspecified ([facility] [address] I73.9     Past Medical History:   Diagnosis Date    BPH with obstruction/lower urinary tract symptoms     s/p TURP 2008 with worsenintg s/s    Carpal tunnel syndrome on left     s/p repair, caused L arm pains chronically    History of myocardial infarction     Hypercholesteremia     Pulmonary nodules 05/2019    stable 12/2020 3 mm nodule RML, 3 mm nodule LLL    Tachycardia       Past Surgical History:   Procedure Laterality Date    COLONOSCOPY N/A 5/9/2019    COLONOSCOPY performed by Elma Epsitia MD at [facility]    caused chronic pain L arm from nerve block    HX CATARACT REMOVAL Bilateral     HX CORONARY ARTERY BYPASS GRAFT  1999    cabg x 3    HX HEART CATHETERIZATION      multiple stents    HX HERNIA REPAIR      bilateral inguinal    HX LUMBAR DISKECTOMY  06/2018    Dr Sushant Pelayo. s/p spinal tumor resection    HX TONSILLECTOMY      HX TOTAL COLECTOMY  2001    partial    HX TURP  2008     Current Outpatient Medications   Medication Sig Dispense Refill    ALPRAZolam (XANAX) 0.5 mg tablet TAKE 1 TABLET NIGHTLY AS   NEEDED FOR ANXIETY. MAXIMUMDAILY AMOUNT: 0.5 MG 90 Tablet 1    lisinopriL (PRINIVIL, ZESTRIL) 5 mg tablet Take 1 Tablet by mouth in the morning. Indications: high blood pressure 90 Tablet 3    niacin (NIASPAN) 1,000 mg Tb24 tab TAKE 2 TABLETS NIGHTLY 180 Tablet 3    metoprolol succinate (TOPROL-XL) 25 mg XL tablet Take 0.5 Tablets by mouth daily. Indications: ventricular rate control in atrial fibrillation 45 Tablet 3    Crestor 10 mg tablet TAKE 1 TABLET NIGHTLY 90 Tablet 3    ramelteon (ROZEREM) 8 mg tablet Take 1 Tablet by mouth nightly as needed for Sleep. 30 Tablet 1    polyethylene glycol (MIRALAX) 17 gram/dose powder Take 17 g by mouth daily. 1/2 as needed      Aspirin, Buffered 81 mg tab Take 81 mg by mouth nightly.       omega-3 fatty acids-vitamin e 1,000 mg cap Take 1 Cap by mouth two (2) times a day. ascorbic acid, vitamin C, (VITAMIN C) 500 mg tablet Take 500 mg by mouth daily. Allergies   Allergen Reactions    Rosuvastatin Myalgia     Generic rosuvastatin causes myalgias       Family History   Problem Relation Age of Onset    Heart Disease Father     Diabetes Mother     Heart Disease Paternal Grandfather     Cancer Maternal Grandmother         pancreatic     Social History     Tobacco Use    Smoking status: Former     Packs/day: 1.00     Years: 50.00     Pack years: 50.00     Types: Cigarettes     Quit date: 5/18/2012     Years since quitting: 10.2    Smokeless tobacco: Never   Substance Use Topics    Alcohol use: Yes     Comment: usually 3 drinks week, but not as much with covid         Jose Macario MD   Discussed with the patient the current USPSTF guidelines released March 9, 2021 for screening for lung cancer. For adults aged 48 to [de-identified] years who have a 20 pack-year smoking history and currently smoke or have quit within the past 15 years the grade B recommendation is to:  Screen for lung cancer with low-dose computed tomography (LDCT) every year. Stop screening once a person has not smoked for 15 years or has a health problem that limits life expectancy or the ability to have lung surgery. The patient has a 50 pack-year history of cigarette smoking and currently is 0. Discussed with patient the risks and benefits of screening, including over-diagnosis, false positive rate, and total radiation exposure. The patient currently exhibits no signs or symptoms suggestive of lung cancer. Discussed with patient the importance of compliance with yearly annual lung cancer screenings and willingness to undergo diagnosis and treatment if screening scan is positive. In addition, the patient was counseled regarding the importance of remaining smoke free and/or total smoking cessation.     Also reviewed the following if the patient has Medicare that as of February 10, 2022, Medicare only covers LDCT screening in patients aged 51-72 with at least a 20 pack-year smoking history who currently smoke or have quit in the last 15 years

## 2022-08-01 NOTE — ASSESSMENT & PLAN NOTE
Does have ACP paperwork  Lung cancer screening ordered  Encourage healthy habits  Reports noting that he has been having some color changes in his fingers when working out-he will send a message to us with the pictures

## 2022-08-01 NOTE — ASSESSMENT & PLAN NOTE
Ramelteon very strong - makes him groggy   Takes it only seldomnly   Cont ramelteon 8mg prn  Previous medications: Trazodone, melatonin, Ambien, lunesta (ineffective); dayvigo (not covered)

## 2022-08-01 NOTE — TELEPHONE ENCOUNTER
Please call his mobile phone. He doesn't know what the gapapentin is and the dayvig medicine is, that his AVS had on it for him to stop taking. Please call him to let him know. Thanks!

## 2022-08-02 DIAGNOSIS — D69.6 THROMBOCYTOPENIA (HCC): ICD-10-CM

## 2022-08-02 DIAGNOSIS — Z12.5 PROSTATE CANCER SCREENING: ICD-10-CM

## 2022-08-02 DIAGNOSIS — R79.9 ABNORMAL FINDING OF BLOOD CHEMISTRY, UNSPECIFIED: ICD-10-CM

## 2022-08-02 DIAGNOSIS — E78.49 OTHER HYPERLIPIDEMIA: ICD-10-CM

## 2022-08-02 DIAGNOSIS — R35.1 NOCTURIA: ICD-10-CM

## 2022-08-02 LAB
ALBUMIN SERPL-MCNC: 3.9 G/DL (ref 3.5–5)
ALBUMIN/GLOB SERPL: 1.3 {RATIO} (ref 1.1–2.2)
ALP SERPL-CCNC: 109 U/L (ref 45–117)
ALT SERPL-CCNC: 39 U/L (ref 12–78)
ANION GAP SERPL CALC-SCNC: 5 MMOL/L (ref 5–15)
AST SERPL-CCNC: 27 U/L (ref 15–37)
BASOPHILS # BLD: 0.1 K/UL (ref 0–0.1)
BASOPHILS NFR BLD: 1 % (ref 0–1)
BILIRUB SERPL-MCNC: 0.8 MG/DL (ref 0.2–1)
BUN SERPL-MCNC: 30 MG/DL (ref 6–20)
BUN/CREAT SERPL: 26 (ref 12–20)
CALCIUM SERPL-MCNC: 9.4 MG/DL (ref 8.5–10.1)
CHLORIDE SERPL-SCNC: 107 MMOL/L (ref 97–108)
CHOLEST SERPL-MCNC: 124 MG/DL
CO2 SERPL-SCNC: 27 MMOL/L (ref 21–32)
CREAT SERPL-MCNC: 1.15 MG/DL (ref 0.7–1.3)
DIFFERENTIAL METHOD BLD: ABNORMAL
EOSINOPHIL # BLD: 0.1 K/UL (ref 0–0.4)
EOSINOPHIL NFR BLD: 1 % (ref 0–7)
ERYTHROCYTE [DISTWIDTH] IN BLOOD BY AUTOMATED COUNT: 13.2 % (ref 11.5–14.5)
EST. AVERAGE GLUCOSE BLD GHB EST-MCNC: 120 MG/DL
GLOBULIN SER CALC-MCNC: 3.1 G/DL (ref 2–4)
GLUCOSE SERPL-MCNC: 110 MG/DL (ref 65–100)
HBA1C MFR BLD: 5.8 % (ref 4–5.6)
HCT VFR BLD AUTO: 47.7 % (ref 36.6–50.3)
HDLC SERPL-MCNC: 61 MG/DL
HDLC SERPL: 2 {RATIO} (ref 0–5)
HGB BLD-MCNC: 15 G/DL (ref 12.1–17)
IMM GRANULOCYTES # BLD AUTO: 0 K/UL (ref 0–0.04)
IMM GRANULOCYTES NFR BLD AUTO: 0 % (ref 0–0.5)
LDLC SERPL CALC-MCNC: 52.6 MG/DL (ref 0–100)
LYMPHOCYTES # BLD: 1.7 K/UL (ref 0.8–3.5)
LYMPHOCYTES NFR BLD: 21 % (ref 12–49)
MCH RBC QN AUTO: 33.9 PG (ref 26–34)
MCHC RBC AUTO-ENTMCNC: 31.4 G/DL (ref 30–36.5)
MCV RBC AUTO: 107.7 FL (ref 80–99)
MONOCYTES # BLD: 0.7 K/UL (ref 0–1)
MONOCYTES NFR BLD: 9 % (ref 5–13)
NEUTS SEG # BLD: 5.5 K/UL (ref 1.8–8)
NEUTS SEG NFR BLD: 68 % (ref 32–75)
NRBC # BLD: 0 K/UL (ref 0–0.01)
NRBC BLD-RTO: 0 PER 100 WBC
PLATELET # BLD AUTO: 146 K/UL (ref 150–400)
PMV BLD AUTO: 11.1 FL (ref 8.9–12.9)
POTASSIUM SERPL-SCNC: 4.9 MMOL/L (ref 3.5–5.1)
PROT SERPL-MCNC: 7 G/DL (ref 6.4–8.2)
RBC # BLD AUTO: 4.43 M/UL (ref 4.1–5.7)
SODIUM SERPL-SCNC: 139 MMOL/L (ref 136–145)
TRIGL SERPL-MCNC: 52 MG/DL (ref ?–150)
VLDLC SERPL CALC-MCNC: 10.4 MG/DL
WBC # BLD AUTO: 8.1 K/UL (ref 4.1–11.1)

## 2022-08-03 LAB
PSA SERPL-MCNC: 0.9 NG/ML (ref 0–4)
REFLEX CRITERIA: NORMAL

## 2022-08-16 ENCOUNTER — OFFICE VISIT (OUTPATIENT)
Dept: INTERNAL MEDICINE CLINIC | Age: 77
End: 2022-08-16
Payer: MEDICARE

## 2022-08-16 VITALS
DIASTOLIC BLOOD PRESSURE: 70 MMHG | HEART RATE: 51 BPM | TEMPERATURE: 98 F | HEIGHT: 67 IN | OXYGEN SATURATION: 98 % | RESPIRATION RATE: 14 BRPM | SYSTOLIC BLOOD PRESSURE: 142 MMHG | WEIGHT: 148 LBS | BODY MASS INDEX: 23.23 KG/M2

## 2022-08-16 DIAGNOSIS — E78.2 MIXED HYPERLIPIDEMIA: ICD-10-CM

## 2022-08-16 DIAGNOSIS — R73.03 PREDIABETES: ICD-10-CM

## 2022-08-16 DIAGNOSIS — N40.1 BPH WITH OBSTRUCTION/LOWER URINARY TRACT SYMPTOMS: ICD-10-CM

## 2022-08-16 DIAGNOSIS — D69.6 THROMBOCYTOPENIA (HCC): Primary | ICD-10-CM

## 2022-08-16 DIAGNOSIS — N13.8 BPH WITH OBSTRUCTION/LOWER URINARY TRACT SYMPTOMS: ICD-10-CM

## 2022-08-16 PROCEDURE — 1101F PT FALLS ASSESS-DOCD LE1/YR: CPT | Performed by: INTERNAL MEDICINE

## 2022-08-16 PROCEDURE — G8753 SYS BP > OR = 140: HCPCS | Performed by: INTERNAL MEDICINE

## 2022-08-16 PROCEDURE — G8510 SCR DEP NEG, NO PLAN REQD: HCPCS | Performed by: INTERNAL MEDICINE

## 2022-08-16 PROCEDURE — G0463 HOSPITAL OUTPT CLINIC VISIT: HCPCS | Performed by: INTERNAL MEDICINE

## 2022-08-16 PROCEDURE — G8754 DIAS BP LESS 90: HCPCS | Performed by: INTERNAL MEDICINE

## 2022-08-16 PROCEDURE — G8420 CALC BMI NORM PARAMETERS: HCPCS | Performed by: INTERNAL MEDICINE

## 2022-08-16 PROCEDURE — 99214 OFFICE O/P EST MOD 30 MIN: CPT | Performed by: INTERNAL MEDICINE

## 2022-08-16 PROCEDURE — G8536 NO DOC ELDER MAL SCRN: HCPCS | Performed by: INTERNAL MEDICINE

## 2022-08-16 PROCEDURE — G8427 DOCREV CUR MEDS BY ELIG CLIN: HCPCS | Performed by: INTERNAL MEDICINE

## 2022-08-16 NOTE — ASSESSMENT & PLAN NOTE
Patient requesting referral to urology due to prolonged time to fully void.   Recent PSA normal  Referral provided as requested

## 2022-08-16 NOTE — PROGRESS NOTES
Note   Chief Complaint   Lab review    Scarlett Jimenes is a 67 y.o. male     1. Thrombocytopenia (HCC)  Assessment & Plan:    from 102. Platelet 191 from 602. WBC 8.1. Hemoglobin 15. Discussed results with the patient. Garcia Nephsevero Benjamin after his lab results came back -recommend monitoring as there is no treatment for this at this time given his counts. Suspect MDS, I discussed possible diagnosis with the patient. Needs repeat labs every 6 months  2. BPH with obstruction/lower urinary tract symptoms  Assessment & Plan:  Patient requesting referral to urology due to prolonged time to fully void. Recent PSA normal  Referral provided as requested  Orders:  -     REFERRAL TO UROLOGY  3. Prediabetes  Assessment & Plan:  A1c 5.8. Prediabetic range. Encourage decrease sugar intake  4. Mixed hyperlipidemia  Assessment & Plan:  LDL 52. At goal.  Continue Crestor 10, niacin, fish oil     Benefits, risks, possible drug interactions, and side effects of all new medications were reviewed with the patient. Pt verbalized understanding. Return to clinic: 6 months for blood counts    An electronic signature was used to authenticate this note. Ivette Mendoza MD  [facility]  8/16/2022    Future Appointments   Date Time Provider Prakash Vasquezi   11/10/2022  9:30 AM Yanni Chung MD [facility] BS AMB   5/5/2023  9:00 AM Brandi Villa MD [facility] BS AMB        Objective   Vitals:       Visit Vitals  BP (!) 142/70   Pulse (!) 51   Temp 98 °F (36.7 °C) (Oral)   Resp 14   Ht 5' 7\" (1.702 m)   Wt 148 lb (67.1 kg)   SpO2 98%   BMI 23.18 kg/m²        Physical Exam  Constitutional:       Appearance: Normal appearance. He is not ill-appearing. Pulmonary:      Effort: No respiratory distress. Neurological:      Mental Status: He is alert. Current Outpatient Medications   Medication Sig    ALPRAZolam (XANAX) 0.5 mg tablet TAKE 1 TABLET NIGHTLY AS   NEEDED FOR ANXIETY. MAXIMUMDAILY AMOUNT: 0.5 MG    lisinopriL (PRINIVIL, ZESTRIL) 5 mg tablet Take 1 Tablet by mouth in the morning. Indications: high blood pressure    niacin (NIASPAN) 1,000 mg Tb24 tab TAKE 2 TABLETS NIGHTLY    metoprolol succinate (TOPROL-XL) 25 mg XL tablet Take 0.5 Tablets by mouth daily. Indications: ventricular rate control in atrial fibrillation    Crestor 10 mg tablet TAKE 1 TABLET NIGHTLY    ramelteon (ROZEREM) 8 mg tablet Take 1 Tablet by mouth nightly as needed for Sleep.    polyethylene glycol (MIRALAX) 17 gram/dose powder Take 17 g by mouth daily. 1/2 as needed    Aspirin, Buffered 81 mg tab Take 81 mg by mouth nightly. omega-3 fatty acids-vitamin e 1,000 mg cap Take 1 Cap by mouth two (2) times a day. ascorbic acid, vitamin C, (VITAMIN C) 500 mg tablet Take 500 mg by mouth daily. No current facility-administered medications for this visit.

## 2022-08-16 NOTE — ASSESSMENT & PLAN NOTE
from 102. Platelet 648 from 728. WBC 8.1. Hemoglobin 15. Discussed results with the patient. Arianna See after his lab results came back -recommend monitoring as there is no treatment for this at this time given his counts. Suspect MDS, I discussed possible diagnosis with the patient.   Needs repeat labs every 6 months

## 2022-08-31 PROBLEM — Z00.00 MEDICARE ANNUAL WELLNESS VISIT, SUBSEQUENT: Status: RESOLVED | Noted: 2021-10-01 | Resolved: 2022-08-31

## 2022-09-27 ENCOUNTER — HOSPITAL ENCOUNTER (OUTPATIENT)
Age: 77
Setting detail: OUTPATIENT SURGERY
Discharge: ANOTHER HOSPITAL | DRG: 309 | End: 2022-09-27
Attending: INTERNAL MEDICINE | Admitting: INTERNAL MEDICINE
Payer: MEDICARE

## 2022-09-27 ENCOUNTER — HOSPITAL ENCOUNTER (INPATIENT)
Age: 77
LOS: 2 days | Discharge: HOME | DRG: 309 | End: 2022-09-29
Attending: EMERGENCY MEDICINE | Admitting: INTERNAL MEDICINE
Payer: MEDICARE

## 2022-09-27 ENCOUNTER — APPOINTMENT (OUTPATIENT)
Dept: GENERAL RADIOLOGY | Age: 77
DRG: 309 | End: 2022-09-27
Attending: EMERGENCY MEDICINE
Payer: MEDICARE

## 2022-09-27 VITALS
RESPIRATION RATE: 11 BRPM | TEMPERATURE: 97.7 F | DIASTOLIC BLOOD PRESSURE: 77 MMHG | OXYGEN SATURATION: 99 % | SYSTOLIC BLOOD PRESSURE: 111 MMHG | HEIGHT: 67 IN | HEART RATE: 140 BPM | WEIGHT: 147.05 LBS | BODY MASS INDEX: 23.08 KG/M2

## 2022-09-27 DIAGNOSIS — Z95.1 HX OF CABG: ICD-10-CM

## 2022-09-27 DIAGNOSIS — D69.6 THROMBOCYTOPENIA (HCC): ICD-10-CM

## 2022-09-27 DIAGNOSIS — I48.91 ATRIAL FIBRILLATION WITH RAPID VENTRICULAR RESPONSE (HCC): Primary | ICD-10-CM

## 2022-09-27 DIAGNOSIS — I10 ESSENTIAL HYPERTENSION: ICD-10-CM

## 2022-09-27 LAB
ALBUMIN SERPL-MCNC: 3.5 G/DL (ref 3.5–5)
ALBUMIN/GLOB SERPL: 1.1 {RATIO} (ref 1.1–2.2)
ALP SERPL-CCNC: 73 U/L (ref 45–117)
ALT SERPL-CCNC: 28 U/L (ref 12–78)
ANION GAP SERPL CALC-SCNC: 6 MMOL/L (ref 5–15)
APTT PPP: 30.4 SEC (ref 22.1–31)
AST SERPL-CCNC: 24 U/L (ref 15–37)
ATRIAL RATE: 132 BPM
BASOPHILS # BLD: 0 K/UL (ref 0–0.1)
BASOPHILS NFR BLD: 1 % (ref 0–1)
BILIRUB SERPL-MCNC: 0.8 MG/DL (ref 0.2–1)
BUN SERPL-MCNC: 17 MG/DL (ref 6–20)
BUN/CREAT SERPL: 14 (ref 12–20)
CALCIUM SERPL-MCNC: 9 MG/DL (ref 8.5–10.1)
CALCULATED R AXIS, ECG10: 44 DEGREES
CALCULATED T AXIS, ECG11: 45 DEGREES
CHLORIDE SERPL-SCNC: 105 MMOL/L (ref 97–108)
CO2 SERPL-SCNC: 29 MMOL/L (ref 21–32)
COMMENT, HOLDF: NORMAL
CREAT SERPL-MCNC: 1.18 MG/DL (ref 0.7–1.3)
DIAGNOSIS, 93000: NORMAL
DIFFERENTIAL METHOD BLD: ABNORMAL
EOSINOPHIL # BLD: 0 K/UL (ref 0–0.4)
EOSINOPHIL NFR BLD: 1 % (ref 0–7)
ERYTHROCYTE [DISTWIDTH] IN BLOOD BY AUTOMATED COUNT: 12 % (ref 11.5–14.5)
GLOBULIN SER CALC-MCNC: 3.1 G/DL (ref 2–4)
GLUCOSE SERPL-MCNC: 100 MG/DL (ref 65–100)
HCT VFR BLD AUTO: 38.9 % (ref 36.6–50.3)
HGB BLD-MCNC: 13 G/DL (ref 12.1–17)
IMM GRANULOCYTES # BLD AUTO: 0 K/UL (ref 0–0.04)
IMM GRANULOCYTES NFR BLD AUTO: 0 % (ref 0–0.5)
INR PPP: 1.2 (ref 0.9–1.1)
LYMPHOCYTES # BLD: 1 K/UL (ref 0.8–3.5)
LYMPHOCYTES NFR BLD: 14 % (ref 12–49)
MCH RBC QN AUTO: 33.1 PG (ref 26–34)
MCHC RBC AUTO-ENTMCNC: 33.4 G/DL (ref 30–36.5)
MCV RBC AUTO: 99 FL (ref 80–99)
MONOCYTES # BLD: 0.7 K/UL (ref 0–1)
MONOCYTES NFR BLD: 10 % (ref 5–13)
NEUTS SEG # BLD: 5.2 K/UL (ref 1.8–8)
NEUTS SEG NFR BLD: 74 % (ref 32–75)
NRBC # BLD: 0 K/UL (ref 0–0.01)
NRBC BLD-RTO: 0 PER 100 WBC
PLATELET # BLD AUTO: 141 K/UL (ref 150–400)
PMV BLD AUTO: 10.5 FL (ref 8.9–12.9)
POTASSIUM SERPL-SCNC: 4.2 MMOL/L (ref 3.5–5.1)
PROT SERPL-MCNC: 6.6 G/DL (ref 6.4–8.2)
PROTHROMBIN TIME: 12.2 SEC (ref 9–11.1)
Q-T INTERVAL, ECG07: 320 MS
QRS DURATION, ECG06: 88 MS
QTC CALCULATION (BEZET), ECG08: 474 MS
RBC # BLD AUTO: 3.93 M/UL (ref 4.1–5.7)
SAMPLES BEING HELD,HOLD: NORMAL
SODIUM SERPL-SCNC: 140 MMOL/L (ref 136–145)
THERAPEUTIC RANGE,PTTT: NORMAL SECS (ref 58–77)
TROPONIN-HIGH SENSITIVITY: 15 NG/L (ref 0–76)
TSH SERPL DL<=0.05 MIU/L-ACNC: 2.18 UIU/ML (ref 0.36–3.74)
VENTRICULAR RATE, ECG03: 132 BPM
WBC # BLD AUTO: 6.9 K/UL (ref 4.1–11.1)

## 2022-09-27 PROCEDURE — 74011250637 HC RX REV CODE- 250/637: Performed by: INTERNAL MEDICINE

## 2022-09-27 PROCEDURE — 74011000258 HC RX REV CODE- 258: Performed by: INTERNAL MEDICINE

## 2022-09-27 PROCEDURE — 74011250636 HC RX REV CODE- 250/636: Performed by: EMERGENCY MEDICINE

## 2022-09-27 PROCEDURE — 74011250636 HC RX REV CODE- 250/636: Performed by: INTERNAL MEDICINE

## 2022-09-27 PROCEDURE — 93005 ELECTROCARDIOGRAM TRACING: CPT

## 2022-09-27 PROCEDURE — 85025 COMPLETE CBC W/AUTO DIFF WBC: CPT

## 2022-09-27 PROCEDURE — 96374 THER/PROPH/DIAG INJ IV PUSH: CPT

## 2022-09-27 PROCEDURE — 85610 PROTHROMBIN TIME: CPT

## 2022-09-27 PROCEDURE — 96376 TX/PRO/DX INJ SAME DRUG ADON: CPT

## 2022-09-27 PROCEDURE — 99223 1ST HOSP IP/OBS HIGH 75: CPT | Performed by: INTERNAL MEDICINE

## 2022-09-27 PROCEDURE — 36415 COLL VENOUS BLD VENIPUNCTURE: CPT

## 2022-09-27 PROCEDURE — 65270000046 HC RM TELEMETRY

## 2022-09-27 PROCEDURE — 74011000250 HC RX REV CODE- 250: Performed by: EMERGENCY MEDICINE

## 2022-09-27 PROCEDURE — 96361 HYDRATE IV INFUSION ADD-ON: CPT

## 2022-09-27 PROCEDURE — 84484 ASSAY OF TROPONIN QUANT: CPT

## 2022-09-27 PROCEDURE — 85730 THROMBOPLASTIN TIME PARTIAL: CPT

## 2022-09-27 PROCEDURE — 76040000019: Performed by: INTERNAL MEDICINE

## 2022-09-27 PROCEDURE — 99285 EMERGENCY DEPT VISIT HI MDM: CPT

## 2022-09-27 PROCEDURE — 74011000250 HC RX REV CODE- 250: Performed by: INTERNAL MEDICINE

## 2022-09-27 PROCEDURE — 84443 ASSAY THYROID STIM HORMONE: CPT

## 2022-09-27 PROCEDURE — 71045 X-RAY EXAM CHEST 1 VIEW: CPT

## 2022-09-27 PROCEDURE — 74011250636 HC RX REV CODE- 250/636: Performed by: NURSE PRACTITIONER

## 2022-09-27 PROCEDURE — 80053 COMPREHEN METABOLIC PANEL: CPT

## 2022-09-27 PROCEDURE — 2709999900 HC NON-CHARGEABLE SUPPLY: Performed by: INTERNAL MEDICINE

## 2022-09-27 RX ORDER — ONDANSETRON 2 MG/ML
4 INJECTION INTRAMUSCULAR; INTRAVENOUS
Status: DISCONTINUED | OUTPATIENT
Start: 2022-09-27 | End: 2022-09-29 | Stop reason: HOSPADM

## 2022-09-27 RX ORDER — METOPROLOL TARTRATE 5 MG/5ML
2.5 INJECTION INTRAVENOUS
Status: COMPLETED | OUTPATIENT
Start: 2022-09-27 | End: 2022-09-27

## 2022-09-27 RX ORDER — ACETAMINOPHEN 325 MG/1
650 TABLET ORAL
Status: DISCONTINUED | OUTPATIENT
Start: 2022-09-27 | End: 2022-09-29 | Stop reason: HOSPADM

## 2022-09-27 RX ORDER — ASPIRIN 81 MG/1
81 TABLET ORAL
Status: DISCONTINUED | OUTPATIENT
Start: 2022-09-27 | End: 2022-09-28

## 2022-09-27 RX ORDER — ROSUVASTATIN CALCIUM 10 MG/1
10 TABLET, COATED ORAL
Status: DISCONTINUED | OUTPATIENT
Start: 2022-09-27 | End: 2022-09-29 | Stop reason: HOSPADM

## 2022-09-27 RX ORDER — METOPROLOL SUCCINATE 25 MG/1
12.5 TABLET, EXTENDED RELEASE ORAL
COMMUNITY
End: 2022-09-29

## 2022-09-27 RX ORDER — LISINOPRIL 5 MG/1
5 TABLET ORAL
COMMUNITY
End: 2022-09-29

## 2022-09-27 RX ORDER — SODIUM CHLORIDE 0.9 % (FLUSH) 0.9 %
5-40 SYRINGE (ML) INJECTION EVERY 8 HOURS
Status: DISCONTINUED | OUTPATIENT
Start: 2022-09-27 | End: 2022-09-29 | Stop reason: HOSPADM

## 2022-09-27 RX ORDER — SODIUM CHLORIDE 0.9 % (FLUSH) 0.9 %
5-40 SYRINGE (ML) INJECTION AS NEEDED
Status: DISCONTINUED | OUTPATIENT
Start: 2022-09-27 | End: 2022-09-29 | Stop reason: HOSPADM

## 2022-09-27 RX ORDER — MELATONIN
1000 DAILY
COMMUNITY

## 2022-09-27 RX ORDER — POLYETHYLENE GLYCOL 3350 17 G/17G
17 POWDER, FOR SOLUTION ORAL DAILY PRN
Status: DISCONTINUED | OUTPATIENT
Start: 2022-09-27 | End: 2022-09-29 | Stop reason: HOSPADM

## 2022-09-27 RX ORDER — ALPRAZOLAM 0.5 MG/1
0.5 TABLET ORAL
Status: DISCONTINUED | OUTPATIENT
Start: 2022-09-27 | End: 2022-09-29 | Stop reason: HOSPADM

## 2022-09-27 RX ORDER — ONDANSETRON 4 MG/1
4 TABLET, ORALLY DISINTEGRATING ORAL
Status: DISCONTINUED | OUTPATIENT
Start: 2022-09-27 | End: 2022-09-29 | Stop reason: HOSPADM

## 2022-09-27 RX ORDER — ACETAMINOPHEN 650 MG/1
650 SUPPOSITORY RECTAL
Status: DISCONTINUED | OUTPATIENT
Start: 2022-09-27 | End: 2022-09-29 | Stop reason: HOSPADM

## 2022-09-27 RX ORDER — DIGOXIN 0.25 MG/ML
250 INJECTION INTRAMUSCULAR; INTRAVENOUS ONCE
Status: COMPLETED | OUTPATIENT
Start: 2022-09-27 | End: 2022-09-27

## 2022-09-27 RX ORDER — METOPROLOL SUCCINATE 25 MG/1
12.5 TABLET, EXTENDED RELEASE ORAL
Status: DISCONTINUED | OUTPATIENT
Start: 2022-09-27 | End: 2022-09-29

## 2022-09-27 RX ORDER — SODIUM CHLORIDE 9 MG/ML
100 INJECTION, SOLUTION INTRAVENOUS CONTINUOUS
Status: DISCONTINUED | OUTPATIENT
Start: 2022-09-27 | End: 2022-09-28

## 2022-09-27 RX ADMIN — SODIUM CHLORIDE 100 ML/HR: 9 INJECTION, SOLUTION INTRAVENOUS at 09:26

## 2022-09-27 RX ADMIN — SODIUM CHLORIDE 500 ML: 9 INJECTION, SOLUTION INTRAVENOUS at 08:50

## 2022-09-27 RX ADMIN — SODIUM CHLORIDE, PRESERVATIVE FREE 10 ML: 5 INJECTION INTRAVENOUS at 18:22

## 2022-09-27 RX ADMIN — SODIUM CHLORIDE 100 ML/HR: 9 INJECTION, SOLUTION INTRAVENOUS at 12:02

## 2022-09-27 RX ADMIN — METOPROLOL TARTRATE 2.5 MG: 5 INJECTION, SOLUTION INTRAVENOUS at 10:40

## 2022-09-27 RX ADMIN — SODIUM CHLORIDE 500 ML: 9 INJECTION, SOLUTION INTRAVENOUS at 10:52

## 2022-09-27 RX ADMIN — SODIUM CHLORIDE 2.5 MG/HR: 900 INJECTION, SOLUTION INTRAVENOUS at 13:21

## 2022-09-27 RX ADMIN — ASPIRIN 81 MG: 81 TABLET, COATED ORAL at 21:33

## 2022-09-27 RX ADMIN — METOPROLOL TARTRATE 2.5 MG: 5 INJECTION, SOLUTION INTRAVENOUS at 09:30

## 2022-09-27 RX ADMIN — ROSUVASTATIN CALCIUM 10 MG: 10 TABLET, COATED ORAL at 21:33

## 2022-09-27 RX ADMIN — METOPROLOL TARTRATE 2.5 MG: 5 INJECTION, SOLUTION INTRAVENOUS at 10:03

## 2022-09-27 RX ADMIN — DIGOXIN 250 MCG: 0.25 INJECTION INTRAMUSCULAR; INTRAVENOUS at 15:53

## 2022-09-27 NOTE — CONSULTS
Son Michaels MD., [facility]    [address] [place], [address] [facility]    Office [phone],ZIX [phone]           9/27/2022     Admit Date: 9/27/2022      Meg Winston is a 67 y.o. male admitted for Atrial fibrillation with rapid ventricular response ([facility] [address] [I48.91]. Consult requested by Sandi Yost DO       Assessment/Plan:    A. fib with RVR  History of CAD-s/p CABG  Hypertension  Hyperlipidemia  History of colon cancer-precancerous per patient s/p colectomy-now gets screening colonoscopy  History of thrombocytopenia    Plan:  If his blood pressure continues to improve, will consider IV Cardizem gtt. Patient on Toprol-XL prior to admission. Hold Prinivil. Echocardiogram  Currently not a candidate for anticoagulation secondary to thrombocytopenia. Will need to get hematology to weigh in regarding anticoagulation  On aspirin prior to admission-continue             []    High complexity decision making was performed  []    Patient is at high-risk of decompensation with multiple organ involvement    Please do not hesitate to contact us with questions or concerns. See note below for details. Son Michaels MD      Cardiac Testing/Procedures: A. Cardiac Cath/PCI:    B.ECHO/FLEX:    C.StressNuclear/Stress ECHO/Stress test:    D.Vascular:    E. EP:    F. Miscellaneous:    History:     Patient  is a 67 y.o. male admitted for A. fib with RVR. Patient came for his routine colonoscopy and was found to be in A. fib with RVR. Patient denies any symptoms of palpitations, chest pain, dyspnea, dizziness, syncope. He has a history of PAF and sees Dr. Radha Alatorre. He is currently not on anticoagulation because of history of thrombocytopenia. Patient missed 24 to 48 hours of his beta-blocker because of the colonoscopy prep. History of CAD with prior CABG 1999. In the ED he was given Lopressor IV but his blood pressure dropped. Blood pressure slowly improved with IV fluids.   Because of being n.p.o. and having the colonoscopy prep, patient is probably dehydrated. If his blood pressure continues to improve, will consider IV Cardizem/gtt. HS Trop - nml    PMH/PSH/FH/Soc Hx:     Past Medical History:   Diagnosis Date    Adenocarcinoma of colon ([facility] [address]     2001    BPH with obstruction/lower urinary tract symptoms     s/p TURP 2008 with worsenintg s/s    CAD (coronary artery disease)     MI in 1993    Carpal tunnel syndrome on left     s/p repair, caused L arm pains chronically    History of myocardial infarction     Hypercholesteremia     Hypertension     Pulmonary nodules 05/2019    stable 12/2020 3 mm nodule RML, 3 mm nodule LLL    Tachycardia       Past Surgical History:   Procedure Laterality Date    COLONOSCOPY N/A 5/9/2019    COLONOSCOPY performed by King Purnima MD at [facility]    caused chronic pain L arm from nerve block    HX CATARACT REMOVAL Bilateral     HX CORONARY ARTERY BYPASS GRAFT  1999    cabg x 3    HX HEART CATHETERIZATION      multiple stents    HX HERNIA REPAIR      bilateral inguinal    HX LUMBAR DISKECTOMY  06/2018    Dr Antoine Vega.  s/p spinal tumor resection    HX TONSILLECTOMY      HX TOTAL COLECTOMY  2001    partial    HX TURP  2008     Allergies   Allergen Reactions    Rosuvastatin Myalgia     Generic rosuvastatin causes myalgias     Family History   Problem Relation Age of Onset    Heart Disease Father     Diabetes Mother     Heart Disease Paternal Grandfather     Cancer Maternal Grandmother         pancreatic      Social History     Tobacco Use    Smoking status: Former     Packs/day: 1.00     Years: 50.00     Pack years: 50.00     Types: Cigarettes     Quit date: 5/18/2012     Years since quitting: 10.3    Smokeless tobacco: Never   Vaping Use    Vaping Use: Never used   Substance Use Topics    Alcohol use: Not Currently     Comment: usually 3 drinks week, but not as much with covid, occasionally    Drug use: Yes     Types: Marijuana     Comment: weekly,           Medications:       Current Facility-Administered Medications   Medication Dose Route Frequency    0.9% sodium chloride infusion  100 mL/hr IntraVENous CONTINUOUS    sodium chloride (NS) flush 5-40 mL  5-40 mL IntraVENous Q8H    sodium chloride (NS) flush 5-40 mL  5-40 mL IntraVENous PRN    acetaminophen (TYLENOL) tablet 650 mg  650 mg Oral Q6H PRN    Or    acetaminophen (TYLENOL) suppository 650 mg  650 mg Rectal Q6H PRN    polyethylene glycol (MIRALAX) packet 17 g  17 g Oral DAILY PRN    ondansetron (ZOFRAN ODT) tablet 4 mg  4 mg Oral Q8H PRN    Or    ondansetron (ZOFRAN) injection 4 mg  4 mg IntraVENous Q6H PRN     Current Outpatient Medications   Medication Sig    folic acid/multivit-min/lutein (CENTRUM SILVER PO) Take  by mouth daily. ALPRAZolam (XANAX) 0.5 mg tablet TAKE 1 TABLET NIGHTLY AS   NEEDED FOR ANXIETY. MAXIMUMDAILY AMOUNT: 0.5 MG    lisinopriL (PRINIVIL, ZESTRIL) 5 mg tablet Take 1 Tablet by mouth in the morning. Indications: high blood pressure    niacin (NIASPAN) 1,000 mg Tb24 tab TAKE 2 TABLETS NIGHTLY    metoprolol succinate (TOPROL-XL) 25 mg XL tablet Take 0.5 Tablets by mouth daily. Indications: ventricular rate control in atrial fibrillation    Crestor 10 mg tablet TAKE 1 TABLET NIGHTLY    ramelteon (ROZEREM) 8 mg tablet Take 1 Tablet by mouth nightly as needed for Sleep.    polyethylene glycol (MIRALAX) 17 gram/dose powder Take 17 g by mouth daily. 1/2 as needed    Aspirin, Buffered 81 mg tab Take 81 mg by mouth nightly. omega-3 fatty acids-vitamin e 1,000 mg cap Take 1 Cap by mouth two (2) times a day. ascorbic acid, vitamin C, (VITAMIN C) 500 mg tablet Take 500 mg by mouth daily.        Review of Systems:     As in HPI - all other ROS negative      Physical Exam:       Visit Vitals  /69   Pulse (!) 112   Temp 98 °F (36.7 °C)   Resp 16   Ht 5' 7\" (1.702 m)   Wt 147 lb (66.7 kg)   SpO2 96%   BMI 23.02 kg/m²         Telemetry: AFIB    Gen: Well-developed, well-nourished, in no acute distress  Neck: Supple,No JVD, No Carotid Bruit,   Resp: No accessory muscle use, Clear breath sounds, No rales or rhonchi  Card: Irregular Rate,Rythm,Normal S1, S2, No murmurs, r  Abd:  Soft,BS+,   MSK: No cyanosis  Skin: No rashes    Neuro: moving all four extremities , follows commands appropriately  Psych:  Good insight, oriented to person, place , alert, Nml Affect  LE: No edema    EKG:  Afib with RVR/NSTT        Cxray: Reviewed     LABS: Reviewed      Care Plan discussed with: Patient, Family, and Nursing Staff      Total time:      mins     Blaze Espitia MD

## 2022-09-27 NOTE — PROGRESS NOTES
Problem: Falls - Risk of  Goal: *Absence of Falls  Description: Document Lucia Ground Fall Risk and appropriate interventions in the flowsheet.   Outcome: Progressing Towards Goal  Note: Fall Risk Interventions:            Medication Interventions: Teach patient to arise slowly, Patient to call before getting OOB    Elimination Interventions: Call light in reach              Problem: Patient Education: Go to Patient Education Activity  Goal: Patient/Family Education  Outcome: Progressing Towards Goal

## 2022-09-27 NOTE — PERIOP NOTES
TRANSFER - OUT REPORT:    Verbal report given Keely KELLEY on [facility]  being transferred to ED Bed 12 for urgent transfer       Report consisted of patients Situation, Background, Assessment and   Recommendations(SBAR). Information from the following report(s) SBAR and Cardiac Rhythm A Fib with RVR  was reviewed with the receiving nurse. Lines:   Peripheral IV 09/27/22 Posterior;Right Hand (Active)   Site Assessment Clean, dry, & intact 09/27/22 0750   Phlebitis Assessment 0 09/27/22 0750   Infiltration Assessment 0 09/27/22 0750   Dressing Status Clean, dry, & intact 09/27/22 0750   Dressing Type Tape;Transparent 09/27/22 0750   Hub Color/Line Status Pink; Infusing 09/27/22 0750   Action Taken Open ports on tubing capped 09/27/22 0750        Opportunity for questions and clarification was provided.       Patient transported with:   Monitor  Registered Nurse

## 2022-09-27 NOTE — ED TRIAGE NOTES
Pt arrives from [facility] where pt was scheduled for out patient colonoscopy. Pt was found to be I Afib RVR after not taking medication for the last 2 days. Pt did not know he could take his medication prior to procedure. Pt last took his medication on Sunday. Pt has no complaints at this time. Pt has hx of Afib since 2006.

## 2022-09-27 NOTE — PERIOP NOTES
Dr. Whelan April notified of patient's current situation of atrial fibrillation with RVR. Colonoscopy cancelled and patient will be transported to ER for evaluation. Patient's wife was updated.

## 2022-09-27 NOTE — H&P
Dewayne Chacon [address]  [address]  [phone]    Admission History and Physical      NAME:  Kateryna Jeffrey   :   1945   MRN:  [id number]     PCP:  Justin Unger MD     Date/Time of service:  2022  3:52 PM        Subjective:     CHIEF COMPLAINT: Atrial flutter with RVR    HISTORY OF PRESENT ILLNESS:     Mr. Caitlin Bruner is a 67 y.o.  male with a past medical history of coronary artery disease status post CABG, hypertension, hyperlipidemia, paroxysmal A. Fib, Colon cancer and thrombocytopenia who is admitted with atrial fibrillation with RVR. Mr. Caitlin Bruner has a history of colon cancer and was undergoing a routine screening colonoscopy today when he was found to be in A. fib with RVR by anesthesia. Patient was sent to the emergency room. Patient states that he may have had an episode of irregular r heart rhythm in 2006 but is unable to recall the details and if he is unsure if he was ever been on anticoagulation. Per documentation, patient does have a history of paroxysmal A. fib and sees Dr. Benjiman Meigs. He denies any current chest pain, heart palpitations, shortness of breath, syncope, lightheadedness or swelling. He denies any abdominal pain, fevers chills or nausea vomiting. Patient denies any issues with bleeding but does note he bruises easily and does have a history of thrombocytopenia. Per notes, patient has been evaluated by hematology prior for thrombocytopenia. Allergies   Allergen Reactions    Rosuvastatin Myalgia     Generic rosuvastatin causes myalgias       Prior to Admission medications    Medication Sig Start Date End Date Taking? Authorizing Provider   lisinopriL (PRINIVIL, ZESTRIL) 5 mg tablet Take 5 mg by mouth nightly. Yes Provider, Historical   metoprolol succinate (TOPROL-XL) 25 mg XL tablet Take 12.5 mg by mouth nightly.    Yes Provider, Historical   cholecalciferol (Vitamin D3) (1000 Units /25 mcg) tablet Take 1,000 Units by mouth daily. Yes Provider, Historical   folic acid/multivit-min/lutein (CENTRUM SILVER PO) Take 1 Tablet by mouth daily. Yes Provider, Historical   ALPRAZolam (XANAX) 0.5 mg tablet TAKE 1 TABLET NIGHTLY AS   NEEDED FOR ANXIETY. MAXIMUMDAILY AMOUNT: 0.5 MG 8/3/46  Yes Justin Unger MD   niacin (NIASPAN) 1,000 mg Tb24 tab TAKE 2 TABLETS NIGHTLY 0/0/26  Yes Justin Unger MD   Crestor 10 mg tablet TAKE 1 TABLET NIGHTLY 4/45/37  Yes Justin Unger MD   polyethylene glycol (MIRALAX) 17 gram/dose powder Take 17 g by mouth daily. 1/2 as needed   Yes Provider, Historical   Aspirin, Buffered 81 mg tab Take 81 mg by mouth nightly. Yes Other, MD Mckenzie   omega-3 fatty acids-vitamin e 1,000 mg cap Take 1 Cap by mouth two (2) times a day. Yes Other, MD Mckenzie   ascorbic acid, vitamin C, (VITAMIN C) 500 mg tablet Take 500 mg by mouth daily. Yes Other, MD Mckenzie       Past Medical History:   Diagnosis Date    Adenocarcinoma of colon ([facility] [address]     2001    BPH with obstruction/lower urinary tract symptoms     s/p TURP 2008 with worsenintg s/s    CAD (coronary artery disease)     MI in 1993    Carpal tunnel syndrome on left     s/p repair, caused L arm pains chronically    History of myocardial infarction     Hypercholesteremia     Hypertension     Pulmonary nodules 05/2019    stable 12/2020 3 mm nodule RML, 3 mm nodule LLL    Tachycardia         Past Surgical History:   Procedure Laterality Date    COLONOSCOPY N/A 5/9/2019    COL[facility] performed by [facility]y [facility] MD at [facility]    caused chronic pain L arm from nerve block    HX CATARACT REMOVAL Bilateral     HX CORONARY ARTERY BYPASS GRAFT  1999    cabg x 3    HX HEART CATHETERIZATION      multiple stents    HX HERNIA REPAIR      bilateral inguinal    HX LUMBAR DISKECTOMY  06/2018    Dr Casimiro Esposito.  s/p spinal tumor resection    HX TONSILLECTOMY      HX TOTAL COLECTOMY  2001    partial    HX TURP  2008 Social History     Tobacco Use    Smoking status: Former     Packs/day: 1.00     Years: 50.00     Pack years: 50.00     Types: Cigarettes     Quit date: 5/18/2012     Years since quitting: 10.3    Smokeless tobacco: Never   Substance Use Topics    Alcohol use: Not Currently     Comment: usually 3 drinks week, but not as much with covid, occasionally        Family History   Problem Relation Age of Onset    Heart Disease Father     Diabetes Mother     Heart Disease Paternal Grandfather     Cancer Maternal Grandmother         pancreatic        Review of Systems:  (bold if positive, if negative)    Gen:  Eyes:  ENT:  CVS:  Pulm:  GI:  :  MS:  Skin:  Psych:  Endo:  Hem:  Renal:  Neuro:            Objective:      VITALS:    Vital signs reviewed; most recent are:    Visit Vitals  BP 91/69 (BP 1 Location: Right upper arm, BP Patient Position: At rest)   Pulse (!) 118   Temp 97.9 °F (36.6 °C)   Resp 20   Ht 5' 7\" (1.702 m)   Wt 66.7 kg (147 lb)   SpO2 94%   BMI 23.02 kg/m²     SpO2 Readings from Last 6 Encounters:   09/27/22 94%   09/27/22 99%   08/16/22 98%   08/01/22 97%   04/28/22 96%   11/18/21 98%        No intake or output data in the 24 hours ending 09/27/22 1552     Exam:     Physical Exam:    Gen:  Well-developed, well-nourished, in no acute distress  HEENT:  Pink conjunctivae, PERRL, hearing intact to voice  Resp:  No accessory muscle use, clear breath sounds without wheezes rales or rhonchi  Card:  RRR, No murmurs, normal S1, S2, no peripheral edema  Abd:  Soft, non-tender, non-distended, normoactive bowel sounds are present  Musc:  No cyanosis or clubbing  Skin:  No rashes or ulcers, skin turgor is good  Neuro:  Cranial nerves 3-12 are grossly intact, follows commands appropriately  Psych:  Oriented to person, place, and time, Alert with good insight      Labs:    Recent Labs     09/27/22  0841   WBC 6.9   HGB 13.0   HCT 38.9   *     Recent Labs     09/27/22  0841      K 4.2      CO2 29    BUN 17   CREA 1.18   CA 9.0   ALB 3.5   TBILI 0.8   ALT 28     No results found for: GLUCPOC  No results for input(s): PH, PCO2, PO2, HCO3, FIO2 in the last 72 hours. Recent Labs     09/27/22  0841   INR 1.2*       Radiology and EKG reviewed:   EKG with atrial for with RVR     Old Records reviewed in [address] [facility]       Assessment/Plan:         Atrial fibrillation with rapid ventricular response (HCC) (9/27/2022)/history of paroxysmal A. fib POA: Unclear trigger -possibly GI prep for colonoscopy. Check TSH. Check echo. Continue Cardizem drip. Resume home beta-blocker. Concern for thrombocytopenia with anticoagulation. Cardiology consulted. Thrombocytopenia POA: Appears around baseline. Has seen hematology outpatient and had an extensive work-up. Will consult hematology for input on anticoagulation. History of hypertension: Hold home lisinopril due to current dill drip. .  Continue beta-blocker. History of coronary artery disease status post CABG: Continue aspirin and statin. History of colon cancer: Defer further endoscopies to GI    Anxiety: Continue Xanax as needed.     Risk of deterioration: high      Total time spent with patient: 48 Minutes **I personally saw and examined the patient during this time period**                 Care Plan discussed with: Patient and Nursing Staff    Discussed:  Care Plan    Prophylaxis:  SCD's    Probable Disposition:  Home w/Family           ___________________________________________________    Attending Physician: Jeannine Kramer DO

## 2022-09-27 NOTE — PROGRESS NOTES
0327am diltiazem stopped due to blood pressure being 86/56  1749pm repeat ekg done on patient. Showed sinus bradycardia. Dilitazem drip still on hold at this time. No complaints of chest pain or sob. Bedside shift change report given to Princess Luong (oncoming nurse) by Pankaj Tirado (offgoing nurse). Report included the following information SBAR, Kardex, and MAR.

## 2022-09-27 NOTE — PROGRESS NOTES
[facility]: TRANSFER MED RECONCILIATION    Comments/Recommendations:     Appreciate med rec completed by nursing  PTA med list is consistent with Rx query  Adjusted a few medications to take at bedtime  Patient reports that he has not taken any of his usual medications since Sunday 9/25 because he was preparing for colonoscopy today  When asked about his allergy to rosuvastatin, he stated that he has muscle aches with it, but tolerates it just fine. Cesar Tirado is still a concern, however, despite patient being able to tolerate the muscle aches. Would continue to monitor closely. Information obtained from: nursing med rec, Rx query, patient (good historian)    Significant PMH/Disease States:   Past Medical History:   Diagnosis Date    Adenocarcinoma of colon ([facility] [address]     2001    BPH with obstruction/lower urinary tract symptoms     s/p TURP 2008 with worsenintg s/s    CAD (coronary artery disease)     MI in 1993    Carpal tunnel syndrome on left     s/p repair, caused L arm pains chronically    History of myocardial infarction     Hypercholesteremia     Hypertension     Pulmonary nodules 05/2019    stable 12/2020 3 mm nodule RML, 3 mm nodule LLL    Tachycardia      Chief Complaint for this Admission:   Chief Complaint   Patient presents with    Irregular Heart Beat     RVR     Allergies: Rosuvastatin    Prior to Admission Medications:     Medication Documentation Review Audit       Reviewed by Dejan Carreon PHARMD (Pharmacist) on 09/27/22 at 1314      Medication Sig Documenting Provider Last Dose Status Taking? ALPRAZolam (XANAX) 0.5 mg tablet TAKE 1 TABLET NIGHTLY AS   NEEDED FOR ANXIETY. MAXIMUMDAILY AMOUNT: 0.5 MG Joce Agarwal MD  Active Yes   ascorbic acid, vitamin C, (VITAMIN C) 500 mg tablet Take 500 mg by mouth daily. Mckenzie Fisher MD 9/25/2022 am Active Yes   Aspirin, Buffered 81 mg tab Take 81 mg by mouth nightly.  Mckenzie Fisher MD 9/25/2022 pm Active Yes   cholecalciferol (Vitamin D3) (1000 Units /25 mcg) tablet Take 1,000 Units by mouth daily. Provider, Historical 9/25/2022 am Active Yes   Crestor 10 mg tablet TAKE 1 TABLET NIGHTLY Vineet Babcock MD 9/25/2022 pm Active Yes           Med Note (Maximus Spencer Sep 27, 2022  1:13 PM) Patient reports \"muscle aches\" with Crestor but is able to tolerate it   folic acid/multivit-min/lutein (CENTRUM SILVER PO) Take 1 Tablet by mouth daily. Provider, Historical 9/25/2022 am Active Yes   lisinopriL (PRINIVIL, ZESTRIL) 5 mg tablet Take 5 mg by mouth nightly. Provider, Historical 9/25/2022 pm Active Yes   metoprolol succinate (TOPROL-XL) 25 mg XL tablet Take 12.5 mg by mouth nightly. Provider, Historical 9/25/2022 pm Active Yes   niacin (NIASPAN) 1,000 mg Tb24 tab TAKE 2 TABLETS NIGHTLY Vineet Babcock MD 9/25/2022 pm Active Yes   omega-3 fatty acids-vitamin e 1,000 mg cap Take 1 Cap by mouth two (2) times a day. Other, MD Mckenzie 9/25/2022 pm Active Yes   polyethylene glycol (MIRALAX) 17 gram/dose powder Take 17 g by mouth daily. 1/2 as needed Provider, Historical  Active Yes                Thank you,  Clifford CALLEJAS, [facility]

## 2022-09-27 NOTE — ED PROVIDER NOTES
HPI     Please note that this dictation was completed with [email or web address], the computer voice recognition software. Quite often unanticipated grammatical, syntax, homophones, and other interpretive errors are inadvertently transcribed by the computer software. Please disregard these errors. Please excuse any errors that have escaped final proofreading. 65-year-old male with a history of A. fib in 2006, hypertension, hypercholesterolemia, MI, CABG and others sent from endoscopy for asymptomatic atrial fib with rapid ventricular rate. Patient states that he was due for colonoscopy today. When they applied the cardiac monitor, they noted that he was with an atrial fib rhythm with rapid ventricular rate. Patient denies any chest pain, shortness of breath. He has no idea when or how long he has been in atrial fibrillation. He takes a baby aspirin. He last took metoprolol 12.5 two days ago but has not taken it the last couple days as he thought that he could not do so in preparation for the colonoscopy. Denies any vomiting, cough or other complaints.     Cardiology:  Dr. Jose Maria Jones      Past Medical History:   Diagnosis Date    Adenocarcinoma of colon [facility])     2001    BPH with obstruction/lower urinary tract symptoms     s/p TURP 2008 with worsenintg s/s    CAD (coronary artery disease)     MI in 1993    Carpal tunnel syndrome on left     s/p repair, caused L arm pains chronically    History of myocardial infarction     Hypercholesteremia     Hypertension     Pulmonary nodules 05/2019    stable 12/2020 3 mm nodule RML, 3 mm nodule LLL    Tachycardia        Past Surgical History:   Procedure Laterality Date    COLONOSCOPY N/A 5/9/2019    COLONOSCOPY performed by Alma Rosa Mulligan MD at [facility]    caused chronic pain L arm from nerve block    HX CATARACT REMOVAL Bilateral     HX CORONARY ARTERY BYPASS GRAFT  1999    cabg x 3    HX HEART CATHETERIZATION      multiple stents    HX HERNIA REPAIR      bilateral inguinal    HX LUMBAR DISKECTOMY  06/2018    Dr Court Flanagan. s/p spinal tumor resection    HX TONSILLECTOMY      HX TOTAL COLECTOMY  2001    partial    HX TURP  2008         Family History:   Problem Relation Age of Onset    Heart Disease Father     Diabetes Mother     Heart Disease Paternal Grandfather     Cancer Maternal Grandmother         pancreatic       Social History     Socioeconomic History    Marital status:      Spouse name: Per Lopez    Number of children: 2    Years of education: Not on file    Highest education level: Not on file   Occupational History     Employer: RETIRED    Occupation: Retired [employer]     Employer: RETIRED   Tobacco Use    Smoking status: Former     Packs/day: 1.00     Years: 50.00     Pack years: 50.00     Types: Cigarettes     Quit date: 5/18/2012     Years since quitting: 10.3    Smokeless tobacco: Never   Vaping Use    Vaping Use: Never used   Substance and Sexual Activity    Alcohol use: Not Currently     Comment: usually 3 drinks week, but not as much with covid, occasionally    Drug use: Yes     Types: Marijuana     Comment: weekly,    Sexual activity: Yes     Partners: Female   Other Topics Concern    Not on file   Social History Narrative    Not on file     Social Determinants of Health     Financial Resource Strain: Not on file   Food Insecurity: Not on file   Transportation Needs: Not on file   Physical Activity: Not on file   Stress: Not on file   Social Connections: Not on file   Intimate Partner Violence: Not on file   Housing Stability: Not on file         ALLERGIES: Rosuvastatin    Review of Systems   Constitutional:  Negative for chills and fever. HENT:  Negative for congestion. Respiratory:  Negative for chest tightness and shortness of breath. Cardiovascular:  Negative for chest pain. Gastrointestinal:  Negative for abdominal pain. All other systems reviewed and are negative.     Vitals:    09/27/22 0831 BP: 112/60   Pulse: (!) 120   Resp: 17   Temp: 98 °F (36.7 °C)   SpO2: 98%   Weight: 66.7 kg (147 lb)   Height: 5' 7\" (1.702 m)            Physical Exam  Vitals and nursing note reviewed. Constitutional:       General: He is not in acute distress. Appearance: Normal appearance. He is well-developed. He is not ill-appearing. HENT:      Head: Normocephalic and atraumatic. Nose: No congestion or rhinorrhea. Mouth/Throat:      Mouth: Mucous membranes are moist.   Eyes:      General: No scleral icterus. Right eye: No discharge. Left eye: No discharge. Conjunctiva/sclera: Conjunctivae normal.   Cardiovascular:      Rate and Rhythm: Normal rate and regular rhythm. Heart sounds: Normal heart sounds. No murmur heard. No friction rub. No gallop. Comments: Sternal scar  Pulmonary:      Effort: Pulmonary effort is normal. No respiratory distress. Breath sounds: Normal breath sounds. No wheezing or rales. Abdominal:      General: There is no distension. Palpations: Abdomen is soft. Tenderness: There is no abdominal tenderness. There is no guarding. Musculoskeletal:         General: No swelling or deformity. Normal range of motion. Cervical back: Normal range of motion and neck supple. No rigidity. Right lower leg: No edema. Left lower leg: No edema. Lymphadenopathy:      Cervical: No cervical adenopathy. Skin:     General: Skin is warm and dry. Coloration: Skin is not jaundiced or pale. Findings: No rash. Neurological:      Mental Status: He is alert and oriented to person, place, and time. Comments: CICI DUEÑAS    69-year-old male here with atrial for with RVR. Currently asymptomatic. He just completed preparation for a colonoscopy. He appears mildly dehydrated. Will give IV fluids, check labs, rate control given unknown onset of atrial fib.     Procedures          ED EKG interpretation:  Rhythm: atrial fib; and irreg irreg. Rate (approx.): 130; Axis: normal; P wave: normal; QRS interval: normal ; ST/T wave: non-specific changes; This EKG was interpreted by Paul Singh MD,ED Provider. 9:08 AM  D/w Dr. Sandy Reno, cardiology. Reviewed hx, exam and results. Bp 102/71 getting ivf. Giving metoprolol IV which he agrees with. Needs to restart metoprolol. Call him back if pt is getting admitted. He may be discharged if rate controlled. 10:48 AM  Heart rate 100-130. Given additional fluid bolus as well as IV Lopressor. Blood pressure 86/68. Fluid bolus infusing. Consulting cardiology. We will hold on additional Lopressor at this point. 10:52 AM  BP 91/70. Total critical care time (not including time spent performing separately reportable procedures): 40 minutes      [employer] Consult for Admission  10:52 AM    ED Room Number: ER12/12  Patient Name and age:  Ally Barker 67 y.o.  male  Working Diagnosis: No diagnosis found. COVID-19 Suspicion:  no  Sepsis present:  no  Reassessment needed: no  Code Status:  Full Code  Readmission: no  Isolation Requirements:  no  Recommended Level of Care:  step down  Department:[facility] ED - [phone]  Other:  received 3 lopressor doses of 2.5.  BP decreased. HR now 100-120. Awaiting cardiology input. 10:55 AM  Perfect[employer] texted cardiology that pt being admitted and for them to consult.     Recent Results (from the past 24 hour(s))   EKG, 12 LEAD, INITIAL    Collection Time: 09/27/22  6:44 AM   Result Value Ref Range    Ventricular Rate 132 BPM    Atrial Rate 132 BPM    QRS Duration 88 ms    Q-T Interval 320 ms    QTC Calculation (Bezet) 474 ms    Calculated R Axis 44 degrees    Calculated T Axis 45 degrees    Diagnosis       Atrial fibrillation with rapid ventricular response  Nonspecific T wave abnormality  Abnormal ECG  No previous ECGs available     CBC WITH AUTOMATED DIFF    Collection Time: 09/27/22  8:41 AM   Result Value Ref Range    WBC 6.9 4.1 - 11.1 K/uL    RBC 3.93 (L) 4.10 - 5.70 M/uL    HGB 13.0 12.1 - 17.0 g/dL    HCT 38.9 36.6 - 50.3 %    MCV 99.0 80.0 - 99.0 FL    MCH 33.1 26.0 - 34.0 PG    MCHC 33.4 30.0 - 36.5 g/dL    RDW 12.0 11.5 - 14.5 %    PLATELET 540 (L) 430 - 400 K/uL    MPV 10.5 8.9 - 12.9 FL    NRBC 0.0 0  WBC    ABSOLUTE NRBC 0.00 0.00 - 0.01 K/uL    NEUTROPHILS 74 32 - 75 %    LYMPHOCYTES 14 12 - 49 %    MONOCYTES 10 5 - 13 %    EOSINOPHILS 1 0 - 7 %    BASOPHILS 1 0 - 1 %    IMMATURE GRANULOCYTES 0 0.0 - 0.5 %    ABS. NEUTROPHILS 5.2 1.8 - 8.0 K/UL    ABS. LYMPHOCYTES 1.0 0.8 - 3.5 K/UL    ABS. MONOCYTES 0.7 0.0 - 1.0 K/UL    ABS. EOSINOPHILS 0.0 0.0 - 0.4 K/UL    ABS. BASOPHILS 0.0 0.0 - 0.1 K/UL    ABS. IMM. GRANS. 0.0 0.00 - 0.04 K/UL    DF AUTOMATED     METABOLIC PANEL, COMPREHENSIVE    Collection Time: 09/27/22  8:41 AM   Result Value Ref Range    Sodium 140 136 - 145 mmol/L    Potassium 4.2 3.5 - 5.1 mmol/L    Chloride 105 97 - 108 mmol/L    CO2 29 21 - 32 mmol/L    Anion gap 6 5 - 15 mmol/L    Glucose 100 65 - 100 mg/dL    BUN 17 6 - 20 MG/DL    Creatinine 1.18 0.70 - 1.30 MG/DL    BUN/Creatinine ratio 14 12 - 20      GFR est AA >60 >60 ml/min/1.73m2    GFR est non-AA 60 (L) >60 ml/min/1.73m2    Calcium 9.0 8.5 - 10.1 MG/DL    Bilirubin, total 0.8 0.2 - 1.0 MG/DL    ALT (SGPT) 28 12 - 78 U/L    AST (SGOT) 24 15 - 37 U/L    Alk.  phosphatase 73 45 - 117 U/L    Protein, total 6.6 6.4 - 8.2 g/dL    Albumin 3.5 3.5 - 5.0 g/dL    Globulin 3.1 2.0 - 4.0 g/dL    A-G Ratio 1.1 1.1 - 2.2     PROTHROMBIN TIME + INR    Collection Time: 09/27/22  8:41 AM   Result Value Ref Range    INR 1.2 (H) 0.9 - 1.1      Prothrombin time 12.2 (H) 9.0 - 11.1 sec   PTT    Collection Time: 09/27/22  8:41 AM   Result Value Ref Range    aPTT 30.4 22.1 - 31.0 sec    aPTT, therapeutic range     58.0 - 77.0 SECS   TROPONIN-HIGH SENSITIVITY    Collection Time: 09/27/22  8:41 AM   Result Value Ref Range    Troponin-High Sensitivity 15 0 - 76 ng/L   SAMPLES BEING HELD    Collection Time: 09/27/22  8:41 AM   Result Value Ref Range    SAMPLES BEING HELD 1RED,1SST     COMMENT        Add-on orders for these samples will be processed based on acceptable specimen integrity and analyte stability, which may vary by analyte. XR CHEST PORT    Result Date: 9/27/2022  EXAM:  XR CHEST PORT INDICATION:  afib with rvr COMPARISON:  8 9 months prior FINDINGS: A portable AP radiograph of the chest was obtained at 909 hours. The patient is on a cardiac monitor. The lungs are clear. The cardiac and mediastinal contours and pulmonary vascularity are normal.  Patient is status post median sternotomy. No acute abnormality is identified.

## 2022-09-27 NOTE — PROGRESS NOTES
9/27/22  4:21 PM    Care Management Initial Evaluation:    CM reviewed EMR and met with patient and patients spouse in the room to complete the initial evaluation. Confirmed charted demographics. Reason for Admission:    Atrilb fib with RVR                   RUR Score:    9%  Level: Low    Payor: [place] Medicare A and B/ [employer]                 Plan for utilizing home health:  No home health history    Rehab History: OP PT following back surgery      PCP: First and Last name:  Amber Nam MD     Name of Practice: [facility]   Are you a current patient: Yes/No: Yes   Approximate date of last visit: 8/16/22   Can you participate in a virtual visit with your PCP:                     Current Advanced Directive/Advance Care Plan: Full Code      Healthcare Decision Maker:   Click here to complete [address] including selection of the Healthcare Decision Maker Relationship (ie \"Primary\")             Primary Decision Maker: Caroline Barker - Spouse - [phone]                  Transition of Care Plan:                    Patient resides with his spouse in a two level home with 2 entry steps and 1st floor living. He is normally independent with all ADL's and uses no DME, drives. Patient uses [facility] for prescription needs. His spouse will be home with him at dc and will transport. 1). Patient admitted for emergent care  2). Cardiology consulted  3). Family to transport at dc  4).  CM following for dc needs    PRATIK Cunha  Care Manager

## 2022-09-28 ENCOUNTER — APPOINTMENT (OUTPATIENT)
Dept: NON INVASIVE DIAGNOSTICS | Age: 77
DRG: 309 | End: 2022-09-28
Attending: INTERNAL MEDICINE
Payer: MEDICARE

## 2022-09-28 LAB
ANION GAP SERPL CALC-SCNC: 4 MMOL/L (ref 5–15)
BUN SERPL-MCNC: 22 MG/DL (ref 6–20)
BUN/CREAT SERPL: 25 (ref 12–20)
CALCIUM SERPL-MCNC: 8.3 MG/DL (ref 8.5–10.1)
CEA SERPL-MCNC: 2.1 NG/ML
CHLORIDE SERPL-SCNC: 110 MMOL/L (ref 97–108)
CO2 SERPL-SCNC: 26 MMOL/L (ref 21–32)
CREAT SERPL-MCNC: 0.87 MG/DL (ref 0.7–1.3)
ECHO AO ASC DIAM: 4.4 CM
ECHO AO ASCENDING AORTA INDEX: 2.49 CM/M2
ECHO AV AREA PEAK VELOCITY: 4 CM2
ECHO AV AREA/BSA PEAK VELOCITY: 2.3 CM2/M2
ECHO AV PEAK GRADIENT: 5 MMHG
ECHO AV PEAK VELOCITY: 1.2 M/S
ECHO AV VELOCITY RATIO: 0.92
ECHO EST RA PRESSURE: 8 MMHG
ECHO LA DIAMETER INDEX: 2.6 CM/M2
ECHO LA DIAMETER: 4.6 CM
ECHO LA VOL 2C: 54 ML (ref 18–58)
ECHO LA VOL 4C: 51 ML (ref 18–58)
ECHO LA VOL BP: 57 ML (ref 18–58)
ECHO LA VOL/BSA BIPLANE: 32 ML/M2 (ref 16–34)
ECHO LA VOLUME AREA LENGTH: 64 ML
ECHO LA VOLUME INDEX A2C: 31 ML/M2 (ref 16–34)
ECHO LA VOLUME INDEX A4C: 29 ML/M2 (ref 16–34)
ECHO LA VOLUME INDEX AREA LENGTH: 36 ML/M2 (ref 16–34)
ECHO LV E' LATERAL VELOCITY: 11 CM/S
ECHO LV E' SEPTAL VELOCITY: 6 CM/S
ECHO LV EDV A2C: 153 ML
ECHO LV EDV A4C: 162 ML
ECHO LV EDV BP: 164 ML (ref 67–155)
ECHO LV EDV INDEX A4C: 92 ML/M2
ECHO LV EDV INDEX BP: 93 ML/M2
ECHO LV EDV NDEX A2C: 86 ML/M2
ECHO LV EJECTION FRACTION A2C: 50 %
ECHO LV EJECTION FRACTION A4C: 62 %
ECHO LV EJECTION FRACTION BIPLANE: 56 % (ref 55–100)
ECHO LV ESV A2C: 77 ML
ECHO LV ESV A4C: 62 ML
ECHO LV ESV BP: 73 ML (ref 22–58)
ECHO LV ESV INDEX A2C: 44 ML/M2
ECHO LV ESV INDEX A4C: 35 ML/M2
ECHO LV ESV INDEX BP: 41 ML/M2
ECHO LV FRACTIONAL SHORTENING: 29 % (ref 28–44)
ECHO LV INTERNAL DIMENSION DIASTOLE INDEX: 3.28 CM/M2
ECHO LV INTERNAL DIMENSION DIASTOLIC: 5.8 CM (ref 4.2–5.9)
ECHO LV INTERNAL DIMENSION SYSTOLIC INDEX: 2.32 CM/M2
ECHO LV INTERNAL DIMENSION SYSTOLIC: 4.1 CM
ECHO LV IVSD: 0.7 CM (ref 0.6–1)
ECHO LV MASS 2D: 148.8 G (ref 88–224)
ECHO LV MASS INDEX 2D: 84.1 G/M2 (ref 49–115)
ECHO LV POSTERIOR WALL DIASTOLIC: 0.7 CM (ref 0.6–1)
ECHO LV RELATIVE WALL THICKNESS RATIO: 0.24
ECHO LVOT AREA: 4.5 CM2
ECHO LVOT DIAM: 2.4 CM
ECHO LVOT MEAN GRADIENT: 2 MMHG
ECHO LVOT PEAK GRADIENT: 4 MMHG
ECHO LVOT PEAK VELOCITY: 1.1 M/S
ECHO LVOT STROKE VOLUME INDEX: 59.3 ML/M2
ECHO LVOT SV: 104.9 ML
ECHO LVOT VTI: 23.2 CM
ECHO MV A VELOCITY: 0.48 M/S
ECHO MV E DECELERATION TIME (DT): 307.6 MS
ECHO MV E VELOCITY: 0.69 M/S
ECHO MV E/A RATIO: 1.44
ECHO MV E/E' LATERAL: 6.27
ECHO MV E/E' RATIO (AVERAGED): 8.89
ECHO MV E/E' SEPTAL: 11.5
ECHO MV REGURGITANT PEAK GRADIENT: 112 MMHG
ECHO MV REGURGITANT PEAK VELOCITY: 5.3 M/S
ECHO PULMONARY ARTERY END DIASTOLIC PRESSURE: 2 MMHG
ECHO PV MAX VELOCITY: 0.9 M/S
ECHO PV PEAK GRADIENT: 3 MMHG
ECHO PV REGURGITANT MAX VELOCITY: 0.7 M/S
ECHO RIGHT VENTRICULAR SYSTOLIC PRESSURE (RVSP): 46 MMHG
ECHO RV FREE WALL PEAK S': 15 CM/S
ECHO RV INTERNAL DIMENSION: 5.2 CM
ECHO RV TAPSE: 2.4 CM (ref 1.7–?)
ECHO TV REGURGITANT MAX VELOCITY: 3.09 M/S
ECHO TV REGURGITANT PEAK GRADIENT: 38 MMHG
GLUCOSE SERPL-MCNC: 101 MG/DL (ref 65–100)
MAGNESIUM SERPL-MCNC: 1.9 MG/DL (ref 1.6–2.4)
POTASSIUM SERPL-SCNC: 4 MMOL/L (ref 3.5–5.1)
SODIUM SERPL-SCNC: 140 MMOL/L (ref 136–145)

## 2022-09-28 PROCEDURE — 93306 TTE W/DOPPLER COMPLETE: CPT | Performed by: STUDENT IN AN ORGANIZED HEALTH CARE EDUCATION/TRAINING PROGRAM

## 2022-09-28 PROCEDURE — 83735 ASSAY OF MAGNESIUM: CPT

## 2022-09-28 PROCEDURE — APPSS30 APP SPLIT SHARED TIME 16-30 MINUTES: Performed by: NURSE PRACTITIONER

## 2022-09-28 PROCEDURE — 74011250636 HC RX REV CODE- 250/636: Performed by: EMERGENCY MEDICINE

## 2022-09-28 PROCEDURE — 65270000046 HC RM TELEMETRY

## 2022-09-28 PROCEDURE — 36415 COLL VENOUS BLD VENIPUNCTURE: CPT

## 2022-09-28 PROCEDURE — 74011000250 HC RX REV CODE- 250: Performed by: INTERNAL MEDICINE

## 2022-09-28 PROCEDURE — 93306 TTE W/DOPPLER COMPLETE: CPT

## 2022-09-28 PROCEDURE — 99232 SBSQ HOSP IP/OBS MODERATE 35: CPT | Performed by: INTERNAL MEDICINE

## 2022-09-28 PROCEDURE — 99223 1ST HOSP IP/OBS HIGH 75: CPT | Performed by: INTERNAL MEDICINE

## 2022-09-28 PROCEDURE — 82378 CARCINOEMBRYONIC ANTIGEN: CPT

## 2022-09-28 PROCEDURE — 80048 BASIC METABOLIC PNL TOTAL CA: CPT

## 2022-09-28 PROCEDURE — 74011250637 HC RX REV CODE- 250/637: Performed by: NURSE PRACTITIONER

## 2022-09-28 PROCEDURE — 74011250637 HC RX REV CODE- 250/637: Performed by: INTERNAL MEDICINE

## 2022-09-28 RX ADMIN — METOPROLOL SUCCINATE 12.5 MG: 25 TABLET, EXTENDED RELEASE ORAL at 20:58

## 2022-09-28 RX ADMIN — SODIUM CHLORIDE, PRESERVATIVE FREE 10 ML: 5 INJECTION INTRAVENOUS at 00:40

## 2022-09-28 RX ADMIN — APIXABAN 5 MG: 5 TABLET, FILM COATED ORAL at 11:03

## 2022-09-28 RX ADMIN — SODIUM CHLORIDE 100 ML/HR: 9 INJECTION, SOLUTION INTRAVENOUS at 00:40

## 2022-09-28 RX ADMIN — SODIUM CHLORIDE, PRESERVATIVE FREE 10 ML: 5 INJECTION INTRAVENOUS at 21:00

## 2022-09-28 RX ADMIN — SODIUM CHLORIDE, PRESERVATIVE FREE 10 ML: 5 INJECTION INTRAVENOUS at 06:03

## 2022-09-28 RX ADMIN — ROSUVASTATIN CALCIUM 10 MG: 10 TABLET, COATED ORAL at 20:58

## 2022-09-28 RX ADMIN — SODIUM CHLORIDE, PRESERVATIVE FREE 10 ML: 5 INJECTION INTRAVENOUS at 14:03

## 2022-09-28 RX ADMIN — APIXABAN 5 MG: 5 TABLET, FILM COATED ORAL at 17:10

## 2022-09-28 NOTE — ROUTINE PROCESS
Dewayne Chacon [facility] [address]  [address] [facility], [address] [place] [address]  [phone]      Medical Progress Note      NAME: Yara Jean   :  1945  MRM:  [id number]    Date/Time of service: 2022         Subjective:     Chief Complaint:  Patient was personally seen and examined by me during this time period. Chart reviewed. FU afib with RVR. Denies any chest pain, palpitations or dyspnea. Objective:       Vitals:       Last 24hrs VS reviewed since prior progress note.  Most recent are:    Visit Vitals  BP (!) 114/55 (BP 1 Location: Right upper arm, BP Patient Position: At rest)   Pulse (!) 56   Temp 98 °F (36.7 °C)   Resp 14   Ht 5' 7\" (1.702 m)   Wt 66.7 kg (147 lb 0.8 oz)   SpO2 93%   BMI 23.03 kg/m²     SpO2 Readings from Last 6 Encounters:   22 93%   22 99%   22 98%   22 97%   22 96%   21 98%          Intake/Output Summary (Last 24 hours) at 2022 1630  Last data filed at 2022 1543  Gross per 24 hour   Intake 1861.67 ml   Output 1300 ml   Net 561.67 ml        Exam:     Physical Exam:    Gen:  Well-developed, well-nourished, in no acute distress  HEENT:  Pink conjunctivae, PERRL, hearing intact to voice  Resp:  No accessory muscle use, clear breath sounds without wheezes rales or rhonchi  Card:  RRR, No murmurs, normal S1, S2, no peripheral edema  Abd:  Soft, non-tender, non-distended, normoactive bowel sounds are present  Musc:  No cyanosis or clubbing  Skin:  No rashes or ulcers, skin turgor is good  Neuro:  Cranial nerves 3-12 are grossly intact, follows commands appropriately  Psych:  Oriented to person, place, and time, Alert with good insight    Medications Reviewed: (see below)    Lab Data Reviewed: (see below)    ______________________________________________________________________    Medications:     Current Facility-Administered Medications   Medication Dose Route Frequency    apixaban (ELIQUIS) tablet 5 mg  5 mg Oral BID 0.9% sodium chloride infusion  100 mL/hr IntraVENous CONTINUOUS    sodium chloride (NS) flush 5-40 mL  5-40 mL IntraVENous Q8H    sodium chloride (NS) flush 5-40 mL  5-40 mL IntraVENous PRN    acetaminophen (TYLENOL) tablet 650 mg  650 mg Oral Q6H PRN    Or    acetaminophen (TYLENOL) suppository 650 mg  650 mg Rectal Q6H PRN    polyethylene glycol (MIRALAX) packet 17 g  17 g Oral DAILY PRN    ondansetron (ZOFRAN ODT) tablet 4 mg  4 mg Oral Q8H PRN    Or    ondansetron (ZOFRAN) injection 4 mg  4 mg IntraVENous Q6H PRN    ALPRAZolam (XANAX) tablet 0.5 mg  0.5 mg Oral QHS PRN    rosuvastatin (CRESTOR) tablet 10 mg  10 mg Oral QHS    metoprolol succinate (TOPROL-XL) XL tablet 12.5 mg  12.5 mg Oral QHS          Lab Review:     Recent Labs     09/27/22  0841   WBC 6.9   HGB 13.0   HCT 38.9   *     Recent Labs     09/28/22  0012 09/27/22  0841    140   K 4.0 4.2   * 105   CO2 26 29   * 100   BUN 22* 17   CREA 0.87 1.18   CA 8.3* 9.0   MG 1.9  --    ALB  --  3.5   TBILI  --  0.8   ALT  --  28   INR  --  1.2*     No results found for: GLUCPOC       Assessment / Plan:     Atrial fibrillation with rapid ventricular response (HCC) (9/27/2022)/history of paroxysmal A. fib POA: Unclear trigger -possibly GI prep for colonoscopy. TSH wnl.  echo low normal EF but diastolic dysfunction. S/p Cardizem drip. Spontaneously converted to NSR. Continue home beta-blocker; monitor BPs. Continue eliquis and monitor platelets. Cardiology following. Thrombocytopenia POA: Appears around baseline. Has seen hematology outpatient and had an extensive work-up. ok for [facility] per hematology      History of hypertension: Hold home lisinopril due to low BPs. Continue beta-blocker. History of coronary artery disease status post CABG: Continue  statin. Aspirin stopped since now on [facility]      History of colon cancer: Defer further endoscopies to GI     Anxiety: Continue Xanax as needed.     Total time spent with patient: 35 Minutes **I personally saw and examined the patient during this time period**                 Care Plan discussed with: Patient and Nursing Staff    Discussed:  Care Plan    Prophylaxis:  eliquis     Disposition:  Home w/Family           ___________________________________________________    Attending Physician: Carlo Duffy DO

## 2022-09-28 NOTE — CONSULTS
[address] [facility] at [address] [place] [address]  [phone]    Hematology / Oncology Consult    Reason for Visit:   Clyde Krueger is a 67 y.o. male who is seen in consultation at the request of Dr. Nina Grijalva for evaluation of thrombocytopenia. History of Present Illness:   Clyde Krueger is a 67 y.o. male with CAD s/p CABG, h/o colon cancer and chronic thrombocytopenia currently hospitalized for A-fib with RVR. Patient was going for his scheduled colonoscopy and was found to be in A-fib with RVR. He was referred to ED. Pt reports prior h/o irregular heart rhythm in approximately 2007, but he cannot recall details of treatment. He states that he has been taking ASA 81mg/d for several months to years. He denies any h/o melena/hematochezia, hematuria. Has never had bleeding complications. Of note, he is followed in our outpatient HemOnc clinic and has had workup for chronic thrombocytopenia. Past Medical History:   Diagnosis Date    Adenocarcinoma of colon ([facility] [address]     2001    BPH with obstruction/lower urinary tract symptoms     s/p TURP 2008 with worsenintg s/s    CAD (coronary artery disease)     MI in 1993    Carpal tunnel syndrome on left     s/p repair, caused L arm pains chronically    History of myocardial infarction     Hypercholesteremia     Hypertension     Pulmonary nodules 05/2019    stable 12/2020 3 mm nodule RML, 3 mm nodule LLL    Tachycardia       Past Surgical History:   Procedure Laterality Date    COLONOSCOPY N/A 5/9/2019    COLONOSCOPY performed by Caridad Reid MD at [facility]    caused chronic pain L arm from nerve block    HX CATARACT REMOVAL Bilateral     HX CORONARY ARTERY BYPASS GRAFT  1999    cabg x 3    HX HEART CATHETERIZATION      multiple stents    HX HERNIA REPAIR      bilateral inguinal    HX LUMBAR DISKECTOMY  06/2018    Dr Sania Desai.  s/p spinal tumor resection    HX TONSILLECTOMY      HX TOTAL COLECTOMY  2001    partial HX TURP  2008      Social History     Tobacco Use    Smoking status: Former     Packs/day: 1.00     Years: 50.00     Pack years: 50.00     Types: Cigarettes     Quit date: 5/18/2012     Years since quitting: 10.3    Smokeless tobacco: Never   Substance Use Topics    Alcohol use: Not Currently     Comment: usually 3 drinks week, but not as much with covid, occasionally      Family History   Problem Relation Age of Onset    Heart Disease Father     Diabetes Mother     Heart Disease Paternal Grandfather     Cancer Maternal Grandmother         pancreatic     Current Facility-Administered Medications   Medication Dose Route Frequency    apixaban (ELIQUIS) tablet 5 mg  5 mg Oral BID    0.9% sodium chloride infusion  100 mL/hr IntraVENous CONTINUOUS    sodium chloride (NS) flush 5-40 mL  5-40 mL IntraVENous Q8H    sodium chloride (NS) flush 5-40 mL  5-40 mL IntraVENous PRN    acetaminophen (TYLENOL) tablet 650 mg  650 mg Oral Q6H PRN    Or    acetaminophen (TYLENOL) suppository 650 mg  650 mg Rectal Q6H PRN    polyethylene glycol (MIRALAX) packet 17 g  17 g Oral DAILY PRN    ondansetron (ZOFRAN ODT) tablet 4 mg  4 mg Oral Q8H PRN    Or    ondansetron (ZOFRAN) injection 4 mg  4 mg IntraVENous Q6H PRN    ALPRAZolam (XANAX) tablet 0.5 mg  0.5 mg Oral QHS PRN    rosuvastatin (CRESTOR) tablet 10 mg  10 mg Oral QHS    metoprolol succinate (TOPROL-XL) XL tablet 12.5 mg  12.5 mg Oral QHS      Allergies   Allergen Reactions    Rosuvastatin Myalgia     Generic rosuvastatin causes myalgias        Review of Systems: A complete review of systems was obtained, negative except as described above.             Physical Exam:   Visit Vitals  /63 (BP 1 Location: Right upper arm, BP Patient Position: At rest)   Pulse (!) 56   Temp 98.1 °F (36.7 °C)   Resp 14   Ht 5' 7\" (1.702 m)   Wt 147 lb 0.8 oz (66.7 kg)   SpO2 92%   BMI 23.03 kg/m²     ECOG PS: 1  General: No distress  Eyes: PERRLA, anicteric sclerae  HENT: Atraumatic with normal appearance of ears and nose; OP clear  Neck: Supple; no thyromegaly, JVD  Lymphatic: No cervical, supraclavicular, axillary adenopathy  Respiratory: CTAB, normal respiratory effort  CV: Normal rate but irregular on my exam this morning. No murmurs, no peripheral edema  GI: Soft, nontender, nondistended, no masses, no hepatomegaly, no splenomegaly  MS: Digits without clubbing or cyanosis. Skin: No rashes, ecchymoses, or petechiae. Normal temperature, turgor, and texture. Neuro/Psych: Moves all 4 extremities. Alert, oriented, appropriate affect, normal judgment/insight      Results:     Lab Results   Component Value Date/Time    WBC 6.9 09/27/2022 08:41 AM    HGB 13.0 09/27/2022 08:41 AM    HCT 38.9 09/27/2022 08:41 AM    PLATELET 850 (L) 27/50/6400 08:41 AM    MCV 99.0 09/27/2022 08:41 AM    ABS. NEUTROPHILS 5.2 09/27/2022 08:41 AM     Lab Results   Component Value Date/Time    Sodium 140 09/28/2022 12:12 AM    Potassium 4.0 09/28/2022 12:12 AM    Chloride 110 (H) 09/28/2022 12:12 AM    CO2 26 09/28/2022 12:12 AM    Glucose 101 (H) 09/28/2022 12:12 AM    BUN 22 (H) 09/28/2022 12:12 AM    Creatinine 0.87 09/28/2022 12:12 AM    GFR est AA >60 09/28/2022 12:12 AM    GFR est non-AA >60 09/28/2022 12:12 AM    Calcium 8.3 (L) 09/28/2022 12:12 AM     Lab Results   Component Value Date/Time    Bilirubin, total 0.8 09/27/2022 08:41 AM    ALT (SGPT) 28 09/27/2022 08:41 AM    Alk. phosphatase 73 09/27/2022 08:41 AM    Protein, total 6.6 09/27/2022 08:41 AM    Albumin 3.5 09/27/2022 08:41 AM    Globulin 3.1 09/27/2022 08:41 AM     Lab Results   Component Value Date/Time    Vitamin B12 1,604 (H) 03/17/2021 09:29 AM    Folate 63.4 (H) 03/17/2021 09:29 AM       Imaging:     Reviewed    Assessment and Recommendations:   Leila Pierce is a 67 y.o. male with CAD s/p CABG, h/o colon cancer and chronic thrombocytopenia is hospitalized with AF with RVR. 1. Chronic thrombocytopenia:   Chronic since at least 2013.  No B12, folate deficiency. Hep B panel, HCV, HIV negative. UPEP negative. No medication culprits. No B symptoms. No EtOH abuse or known liver disease. Given concurrent macrocytosis, it is possible that patient has MDS (Myelodysplastic syndrome). I discussed with patient that MDS can cause a sluggish bone marrow with low counts. However, it does not require treatment unless PLT count remains persistently below 80-90 range. Therefore, I recommend monitoring his PLT count periodically 2-3 times a year. I recommend delaying bone marrow biopsy until the future if needed. No contraindication to anticoagulation unless PLT count declines to < 50.   -- Outpatient HemOnc follow up already scheduled for Nov 2022.   -- Can see me sooner if PLT drop < 100 or Hgb < 10.      2. Atrial fibrillation with RVR / CAD s/p PCI And CABG:  Cardiology following and I will defer to them whether pt needs anticoagulation for A-fib. No Hematologic contraindication to using anticoagulation such as Eliquis as patient's PLT count remains consistently above 50. Pt had 1 episode of PLT count 89 in 2015, but was higher than 100 otherwise. Resuming Metoprolol and discontinuing ASA per Cardiology. 3. H/o colon cancer:   Surgical resection in 2001. No melena/hematochezia. Will need to reschedule surveillance colonoscopy in near future. 4.  Sinus bradycardia: 2/2 Metoprolol. 5. Macrocytosis without anemia:  No B12 or folate deficiency. Could be due to MDS. Will monitor.      Signed By: Krystyna Block MD     September 28, 2022

## 2022-09-28 NOTE — PROGRESS NOTES
Rounded on Nondenominational patients and provided Anointing of the Sick at request of patient.     Harleen Juan

## 2022-09-28 NOTE — PROGRESS NOTES
9/28/22  2:54 PM    Care Management Daily Progress Note:    Admitted for Atrial Fib    RUR: 9%  LOS: 1D    Transition of Care:  1). Patient continues to require medical management  2). Cardiology following- ECHO today  3). Hematology consulted  4). Family will transport at dc  5).  CM following for dc needs    PRATIK Love  Care Manager

## 2022-09-28 NOTE — PROGRESS NOTES
1915 Bedside and Verbal shift change report given to WARREN Roman (oncoming nurse) by Riva Schwab, RN (offgoing nurse). Report included the following information SBAR, Kardex, Intake/Output, MAR, Recent Results, and Cardiac Rhythm NSR/Afib . This patient was assisted with Intentional Toileting every 2 hours during this shift as appropriate. Documentation of ambulation and output reflected on Flowsheet as appropriate. Purposeful hourly rounding was completed using AIDET and 5Ps. Outcomes of PHR documented as they occurred. Bed alarm in use as appropriate. Dual Suction and ambubag in place.

## 2022-09-28 NOTE — PROGRESS NOTES
This patient was assisted with Intentional Toileting every 2 hours during this shift as appropriate. Documentation of ambulation and output reflected on Flowsheet as appropriate. Purposeful hourly rounding was completed using AIDET and 5Ps. Outcomes of PHR documented as they occurred. Bed alarm in use as appropriate. Dual Suction and ambubag in place. 1700pm nurse spoke with  who reports to stop IV fuids and monitor blood pressure. 1900pm Bedside shift change report given to WARREN Negro (oncoming nurse) by Joanna Arias (offgoing nurse). Report included the following information SBAR, Kardex, and MAR.

## 2022-09-28 NOTE — PROGRESS NOTES
CARDIOLOGY PROGRESS NOTE        [address] [facility], [place], [address] [facility]  Phone [phone]; Fax [phone]          2022 9:55 AM       Admit Date:           2022  Admit Diagnosis:  Atrial fibrillation with rapid ventricular response ([address] [I48.91]  :          1945   MRN:          [id number]        Assessment/Plan  A-fib w/ RVR: converted to SR yesterday, resume metoprolol. Will start Eliquis, ok by ирина. TTE pending. If echo ok, can d/c home today     2. Hx of CAD, s/p CABG: was on ASA, d/c since starting Eliquis. Cont statin    3. HTN: BP low inpatient, cont toprol XL. Would not resume lisinopril on d/c    4. HLD: cont Crestor    5. Hx of mild thrombocytopenia: evaluated by ирина this morning, ok for Eliquis. OP follow up      NP spent 10 minutes in chart review of notes, VS, diagnostics. NP spent 8 minutes in examination of pt and review of plan of care  with pt/family. Pt personally seen and examined. Chart reviewed. Agree with advanced NP's history, exam and  A/P with changes/additons. Blood pressure (!) 147/70, pulse 63, temperature 98 °F (36.7 °C), resp. rate 14, height 5' 7\" (1.702 m), weight 147 lb 0.8 oz (66.7 kg), SpO2 93 %. Alert/Not in acute distress  Neck - no JVD  CVS - S1 S2 +; Reg;2/6 sys murmur  RS : CTAB; No rales/rhonchi  Abd: Soft/BS+  LE - no edema    22    ECHO ADULT COMPLETE 2022    Interpretation Summary    Left Ventricle: Low normal left ventricular systolic function with a visually estimated EF of 50 - 55%. Left ventricle is mildly dilated. Normal wall motion. Diastolic dysfunction present with normal LV EF. Right Ventricle: Right ventricle is mildly dilated. Normal wall thickness. Mildly reduced systolic function. Aortic Valve: Tricuspid valve. Mild sclerosis of the aortic valve cusp. Mitral Valve: Mild leaflet prolapse noted. Mild regurgitation.     Right Atrium: Right atrium is moderately dilated. Aorta: Not well visualized. Dilated aortic root. Signed by: Montegut Ready, DO on 9/28/2022 12:53 PM        A/P :    A. fib with RVR  History of CAD-s/p CABG  Hypertension  Hyperlipidemia  History of colon cancer-precancerous per patient s/p colectomy-now gets screening colonoscopy  History of thrombocytopenia     Plan:  [facility] per hematololgy  DC JERMAIN Peres MD, [facility]      We discussed the expected course, resolution and complications of the diagnosis(es) in detail. Medication risks, benefits, costs, interactions, and alternatives were discussed as indicated. No intake/output data recorded. Last 3 Recorded Weights in this Encounter    09/27/22 0831 09/28/22 1007   Weight: 147 lb (66.7 kg) 147 lb 0.8 oz (66.7 kg)         09/27 0701 - 09/28 1900  In: 2451.7 [P.O.:720; I.V.:1731.7]  Out: 1300 [Urine:1300]    SUBJECTIVE      67 y.o. male admitted for A. fib with RVR. Patient came for his routine colonoscopy and was found to be in A. fib with RVR. Patient denies any symptoms of palpitations, chest pain, dyspnea, dizziness, syncope. He has a history of PAF and sees Dr. Loretta Zambrano. He is currently not on anticoagulation because of history of thrombocytopenia. Patient missed 24 to 48 hours of his beta-blocker because of the colonoscopy prep. History of CAD with prior CABG 1999. Ezekiel Barker reports none.       Current Facility-Administered Medications   Medication Dose Route Frequency    apixaban (ELIQUIS) tablet 5 mg  5 mg Oral BID    0.9% sodium chloride infusion  100 mL/hr IntraVENous CONTINUOUS    sodium chloride (NS) flush 5-40 mL  5-40 mL IntraVENous Q8H    sodium chloride (NS) flush 5-40 mL  5-40 mL IntraVENous PRN    acetaminophen (TYLENOL) tablet 650 mg  650 mg Oral Q6H PRN    Or    acetaminophen (TYLENOL) suppository 650 mg  650 mg Rectal Q6H PRN    polyethylene glycol (MIRALAX) packet 17 g  17 g Oral DAILY PRN    ondansetron (ZOFRAN ODT) tablet 4 mg  4 mg Oral Q8H PRN    Or    ondansetron (ZOFRAN) injection 4 mg  4 mg IntraVENous Q6H PRN    ALPRAZolam (XANAX) tablet 0.5 mg  0.5 mg Oral QHS PRN    rosuvastatin (CRESTOR) tablet 10 mg  10 mg Oral QHS    metoprolol succinate (TOPROL-XL) XL tablet 12.5 mg  12.5 mg Oral QHS      OBJECTIVE               Intake/Output Summary (Last 24 hours) at 9/28/2022 1906  Last data filed at 9/28/2022 1543  Gross per 24 hour   Intake 1686.67 ml   Output 1300 ml   Net 386.67 ml       Review of Systems - History obtained from the patient AS PER  HPI        PHYSICAL EXAM        Visit Vitals  BP (!) 147/70 (BP 1 Location: Right upper arm, BP Patient Position: At rest)   Pulse 63   Temp 98 °F (36.7 °C)   Resp 14   Ht 5' 7\" (1.702 m)   Wt 147 lb 0.8 oz (66.7 kg)   SpO2 93%   BMI 23.03 kg/m²       Gen: Well-developed, well-nourished, in no acute distress  alert and oriented x 3  HEENT:  Pink conjunctivae, Hearing grossly normal.No scleral icterus or conjunctival, moist mucous membranes  Neck: Supple,No JVD, No Carotid Bruit, Thyroid- non tender No cervical lymphadenopathy  Resp: No accessory muscle use, Clear breath sounds, No rales or rhonchi  Card: Regular Rate,Rythm,Normal S1, S2, No murmurs, rubs or gallop. No thrills. MSK: No cyanosis or clubbing, good capillary refill  Skin: No rashes or ulcers, no bruising  Neuro:  Moving all four extremities, no focal deficit, follows commands appropriately  Psych:  Good insight, oriented to person, place and time, alert, Nml Affect  LE: No edema       DATA REVIEW      No specialty comments available. Cardiac monitor: SR        Laboratory and Imaging have been reviewed by me and are notable for  No results for input(s): CPK, CKMB, TROIQ in the last 72 hours.   Recent Labs     09/28/22  0012 09/27/22  0841    140   K 4.0 4.2   CO2 26 29   BUN 22* 17   CREA 0.87 1.18   * 100   MG 1.9  --    WBC  --  6.9   HGB  --  13.0   HCT  --  38.9   PLT  --  141*

## 2022-09-28 NOTE — PROGRESS NOTES
1900  Bedside and Verbal shift change report given to Breanna Bradshaw RN (oncoming nurse) by Antolin Kaiser RN (offgoing nurse). Report included the following information SBAR, Kardex, Intake/Output, MAR, Recent Results, and Cardiac Rhythm NSR .      2015: received call from Dr. Concha Bustos. MD to place orders for discharge. MD to talk with pt about discharging this evening. Pt would prefer to discharge in the morning. MD agreeable with this plan of care. 2030: pt heart rate sustaining in low 60s, with occasional drops into the upper 50s. Metoprolol due at 2100. Dr. Concha Bustos notified of pt status. Orders received to administer metoprolol. See MAR. This patient was assisted with Intentional Toileting every 2 hours during this shift as appropriate. Documentation of ambulation and output reflected on Flowsheet as appropriate. Purposeful hourly rounding was completed using AIDET and 5Ps. Outcomes of PHR documented as they occurred. Bed alarm in use as appropriate. Dual Suction and ambubag in place. 0700  Bedside and Verbal shift change report given to WARREN Gagnon (oncoming nurse) by Breanna Bradshaw, WARREN (offgoing nurse). Report included the following information SBAR, Kardex, Intake/Output, MAR, Recent Results, and Cardiac Rhythm NSR .

## 2022-09-29 ENCOUNTER — APPOINTMENT (OUTPATIENT)
Dept: NON INVASIVE DIAGNOSTICS | Age: 77
DRG: 309 | End: 2022-09-29
Attending: INTERNAL MEDICINE
Payer: MEDICARE

## 2022-09-29 ENCOUNTER — APPOINTMENT (OUTPATIENT)
Dept: NON INVASIVE DIAGNOSTICS | Age: 77
DRG: 309 | End: 2022-09-29
Attending: NURSE PRACTITIONER
Payer: MEDICARE

## 2022-09-29 ENCOUNTER — TELEPHONE (OUTPATIENT)
Dept: CARDIOLOGY CLINIC | Age: 77
End: 2022-09-29

## 2022-09-29 VITALS
TEMPERATURE: 98.3 F | BODY MASS INDEX: 23.08 KG/M2 | HEART RATE: 57 BPM | HEIGHT: 67 IN | SYSTOLIC BLOOD PRESSURE: 118 MMHG | DIASTOLIC BLOOD PRESSURE: 61 MMHG | RESPIRATION RATE: 17 BRPM | OXYGEN SATURATION: 95 % | WEIGHT: 147.05 LBS

## 2022-09-29 LAB
ANION GAP SERPL CALC-SCNC: 2 MMOL/L (ref 5–15)
ATRIAL RATE: 51 BPM
BASOPHILS # BLD: 0 K/UL (ref 0–0.1)
BASOPHILS NFR BLD: 1 % (ref 0–1)
BUN SERPL-MCNC: 16 MG/DL (ref 6–20)
BUN/CREAT SERPL: 16 (ref 12–20)
CALCIUM SERPL-MCNC: 8.6 MG/DL (ref 8.5–10.1)
CALCULATED R AXIS, ECG10: 22 DEGREES
CALCULATED T AXIS, ECG11: 40 DEGREES
CHLORIDE SERPL-SCNC: 112 MMOL/L (ref 97–108)
CO2 SERPL-SCNC: 28 MMOL/L (ref 21–32)
CREAT SERPL-MCNC: 1.02 MG/DL (ref 0.7–1.3)
DIAGNOSIS, 93000: NORMAL
DIFFERENTIAL METHOD BLD: ABNORMAL
EOSINOPHIL # BLD: 0.1 K/UL (ref 0–0.4)
EOSINOPHIL NFR BLD: 2 % (ref 0–7)
ERYTHROCYTE [DISTWIDTH] IN BLOOD BY AUTOMATED COUNT: 12.1 % (ref 11.5–14.5)
GLUCOSE SERPL-MCNC: 95 MG/DL (ref 65–100)
HCT VFR BLD AUTO: 34.9 % (ref 36.6–50.3)
HGB BLD-MCNC: 11.6 G/DL (ref 12.1–17)
IMM GRANULOCYTES # BLD AUTO: 0 K/UL (ref 0–0.04)
IMM GRANULOCYTES NFR BLD AUTO: 0 % (ref 0–0.5)
LYMPHOCYTES # BLD: 1.4 K/UL (ref 0.8–3.5)
LYMPHOCYTES NFR BLD: 19 % (ref 12–49)
MAGNESIUM SERPL-MCNC: 2 MG/DL (ref 1.6–2.4)
MCH RBC QN AUTO: 33.5 PG (ref 26–34)
MCHC RBC AUTO-ENTMCNC: 33.2 G/DL (ref 30–36.5)
MCV RBC AUTO: 100.9 FL (ref 80–99)
MONOCYTES # BLD: 0.8 K/UL (ref 0–1)
MONOCYTES NFR BLD: 11 % (ref 5–13)
NEUTS SEG # BLD: 4.7 K/UL (ref 1.8–8)
NEUTS SEG NFR BLD: 67 % (ref 32–75)
NRBC # BLD: 0 K/UL (ref 0–0.01)
NRBC BLD-RTO: 0 PER 100 WBC
P-R INTERVAL, ECG05: 190 MS
PLATELET # BLD AUTO: 115 K/UL (ref 150–400)
PMV BLD AUTO: 10.7 FL (ref 8.9–12.9)
POTASSIUM SERPL-SCNC: 4.1 MMOL/L (ref 3.5–5.1)
Q-T INTERVAL, ECG07: 416 MS
QRS DURATION, ECG06: 90 MS
QTC CALCULATION (BEZET), ECG08: 383 MS
RBC # BLD AUTO: 3.46 M/UL (ref 4.1–5.7)
SODIUM SERPL-SCNC: 142 MMOL/L (ref 136–145)
VENTRICULAR RATE, ECG03: 51 BPM
WBC # BLD AUTO: 7 K/UL (ref 4.1–11.1)

## 2022-09-29 PROCEDURE — 80048 BASIC METABOLIC PNL TOTAL CA: CPT

## 2022-09-29 PROCEDURE — 99231 SBSQ HOSP IP/OBS SF/LOW 25: CPT | Performed by: INTERNAL MEDICINE

## 2022-09-29 PROCEDURE — 36415 COLL VENOUS BLD VENIPUNCTURE: CPT

## 2022-09-29 PROCEDURE — APPSS30 APP SPLIT SHARED TIME 16-30 MINUTES: Performed by: NURSE PRACTITIONER

## 2022-09-29 PROCEDURE — 74011250637 HC RX REV CODE- 250/637: Performed by: NURSE PRACTITIONER

## 2022-09-29 PROCEDURE — 85025 COMPLETE CBC W/AUTO DIFF WBC: CPT

## 2022-09-29 PROCEDURE — 99232 SBSQ HOSP IP/OBS MODERATE 35: CPT | Performed by: INTERNAL MEDICINE

## 2022-09-29 PROCEDURE — 83735 ASSAY OF MAGNESIUM: CPT

## 2022-09-29 RX ORDER — METOPROLOL TARTRATE 25 MG/1
12.5 TABLET, FILM COATED ORAL EVERY 12 HOURS
Status: DISCONTINUED | OUTPATIENT
Start: 2022-09-30 | End: 2022-09-29 | Stop reason: HOSPADM

## 2022-09-29 RX ORDER — METOPROLOL TARTRATE 25 MG/1
12.5 TABLET, FILM COATED ORAL EVERY 12 HOURS
Qty: 30 TABLET | Refills: 1 | Status: SHIPPED | OUTPATIENT
Start: 2022-09-30 | End: 2022-10-11 | Stop reason: ALTCHOICE

## 2022-09-29 RX ADMIN — APIXABAN 5 MG: 5 TABLET, FILM COATED ORAL at 09:04

## 2022-09-29 NOTE — PROGRESS NOTES
1:20 PM  CM noted dc order. No CM needs noted. [place]. [id number] Management Interventions  PCP Verified by CM: Yes  Mode of Transport at Discharge: Other (see comment) (Spouse will transport at [employer])  Transition of Care Consult (CM Consult): Discharge Planning  Discharge Durable Medical Equipment: No  Physical Therapy Consult: No  Occupational Therapy Consult: No  Speech Therapy Consult: No  Support Systems: Spouse/Significant Other  Confirm Follow Up Transport: Family  Discharge Location  Patient Expects to be Discharged to[de-identified] Home with family assistance      9/29/22  10:34 AM    Care Management Daily Progress Note:     -Likely dc today- will need follow up PCP appointment- requested assistance from CM specialist. Will need to be seen early next week for blood work follow up. Medicare pt has received, reviewed, and signed 2nd IM letter informing them of their right to appeal the discharge. Signed copy has been placed on pt bedside chart. Admitted for Atrial Fib     RUR: 9%  LOS: 2D     Transition of Care:  1). Patient continues to require medical management  2). Cardiology following- waiting for dc clearance  3). Hematology consulted  4). Family will transport at dc  5). CM following for dc needs    [facility] Management Interventions  PCP Verified by CM: Yes  Mode of Transport at Discharge:  Other (see comment) (Spouse will transport at [employer])  Transition of Care Consult (CM Consult): Discharge Planning  Discharge Durable Medical Equipment: No  Physical Therapy Consult: No  Occupational Therapy Consult: No  Speech Therapy Consult: No  Support Systems: Spouse/Significant Other  Confirm Follow Up Transport: Family  Discharge Location  Patient Expects to be Discharged to[de-identified] Home with family assistance

## 2022-09-29 NOTE — PROGRESS NOTES
Attempted to schedule a hospital follow up appointment with PCP   Tomer Mendoza MD [facility]. Scheduling advised that they do not have any 1 week appointments wich patients needs in a week to follow up on Blood Work. Earliest appointment available is not until 10/20/2022. Urgent message was sent to practice manager to see if patient could be worked in on schedule. Practice manager never responded back sent urgent email to [place].

## 2022-09-29 NOTE — PROGRESS NOTES
CARDIOLOGY PROGRESS NOTE        [address] [facility], [place], [address] [facility]  Phone [phone]; Fax [phone]          2022 9:55 AM       Admit Date:           2022  Admit Diagnosis:  Atrial fibrillation with rapid ventricular response ([address] [I48.91]  :          1945   MRN:          [id number]        Assessment/Plan  A-fib w/ RVR: converted to SR, cont metoprolol - switch to bid dosing d/t episodes of bradycardia. Cont ok by ирина. TTE pending. TTE w/ EF 50-55%, normal wall motion. D/c home today     2. Hx of CAD, s/p CABG: was on ASA, d/c since starting Eliquis. Cont statin    3. HTN: BP low inpatient, cont toprol XL. Would not resume lisinopril on d/c    4. HLD: cont Crestor    5. Hx of mild thrombocytopenia: evaluated by ap gifford for Eliquis. OP follow up      NP spent 8 minutes in chart review of notes, VS, diagnostics. NP spent 10 minutes in examination of pt and review of plan of care  with pt/family. Pt personally seen and examined. Chart reviewed. Agree with advanced NP's history, exam and  A/P with changes/additons. Blood pressure (!) 147/70, pulse 63, temperature 98 °F (36.7 °C), resp. rate 14, height 5' 7\" (1.702 m), weight 147 lb 0.8 oz (66.7 kg), SpO2 93 %. Alert/Not in acute distress  Neck - no JVD  CVS - S1 S2 +; Reg;2/6 sys murmur  RS : CTAB; No rales/rhonchi  Abd: Soft/BS+  LE - no edema     22     ECHO ADULT COMPLETE 2022     Interpretation Summary    Left Ventricle: Low normal left ventricular systolic function with a visually estimated EF of 50 - 55%. Left ventricle is mildly dilated. Normal wall motion. Diastolic dysfunction present with normal LV EF. Right Ventricle: Right ventricle is mildly dilated. Normal wall thickness. Mildly reduced systolic function. Aortic Valve: Tricuspid valve. Mild sclerosis of the aortic valve cusp. Mitral Valve: Mild leaflet prolapse noted.  Mild regurgitation. Right Atrium: Right atrium is moderately dilated. Aorta: Not well visualized. Dilated aortic root. Signed by: Tiffanie Baugh DO on 9/28/2022 12:53 PM           A/P :     A. fib with RVR  History of CAD-s/p CABG  Hypertension  Hyperlipidemia  History of colon cancer-precancerous per patient s/p colectomy-now gets screening colonoscopy  History of thrombocytopenia     Plan:  Now in SR- HR dipping into 40's ( at night) - event monitor  OK with DOAC per hematololgy  Will see prn         Monse Bourgeois MD, [facility]  We discussed the expected course, resolution and complications of the diagnosis(es) in detail. Medication risks, benefits, costs, interactions, and alternatives were discussed as indicated. No intake/output data recorded. Last 3 Recorded Weights in this Encounter    09/27/22 0831 09/28/22 1007   Weight: 66.7 kg (147 lb) 66.7 kg (147 lb 0.8 oz)         09/27 1901 - 09/29 0700  In: 1926.7 [P.O.:960; I.V.:966.7]  Out: 1600 [Urine:1600]    SUBJECTIVE      67 y.o. male admitted for A. fib with RVR. Patient came for his routine colonoscopy and was found to be in A. fib with RVR. Patient denies any symptoms of palpitations, chest pain, dyspnea, dizziness, syncope. He has a history of PAF and sees Dr. Karely Blakely. He is currently not on anticoagulation because of history of thrombocytopenia. Patient missed 24 to 48 hours of his beta-blocker because of the colonoscopy prep. History of CAD with prior CABG 1999. Ezekiel Barker reports none.       Current Facility-Administered Medications   Medication Dose Route Frequency    [START ON 9/30/2022] metoprolol tartrate (LOPRESSOR) tablet 12.5 mg  12.5 mg Oral Q12H    apixaban (ELIQUIS) tablet 5 mg  5 mg Oral BID    sodium chloride (NS) flush 5-40 mL  5-40 mL IntraVENous Q8H    sodium chloride (NS) flush 5-40 mL  5-40 mL IntraVENous PRN    acetaminophen (TYLENOL) tablet 650 mg  650 mg Oral Q6H PRN    Or    acetaminophen (TYLENOL) suppository 650 mg  650 mg Rectal Q6H PRN    polyethylene glycol (MIRALAX) packet 17 g  17 g Oral DAILY PRN    ondansetron (ZOFRAN ODT) tablet 4 mg  4 mg Oral Q8H PRN    Or    ondansetron (ZOFRAN) injection 4 mg  4 mg IntraVENous Q6H PRN    ALPRAZolam (XANAX) tablet 0.5 mg  0.5 mg Oral QHS PRN    rosuvastatin (CRESTOR) tablet 10 mg  10 mg Oral QHS      OBJECTIVE               Intake/Output Summary (Last 24 hours) at 9/29/2022 1028  Last data filed at 9/29/2022 0349  Gross per 24 hour   Intake 1366.67 ml   Output 300 ml   Net 1066.67 ml       Review of Systems - History obtained from the patient AS PER  HPI        PHYSICAL EXAM        Visit Vitals  /65 (BP 1 Location: Right upper arm, BP Patient Position: At rest)   Pulse 68   Temp 97.9 °F (36.6 °C)   Resp 16   Ht 5' 7\" (1.702 m)   Wt 66.7 kg (147 lb 0.8 oz)   SpO2 97%   BMI 23.03 kg/m²       Gen: Well-developed, well-nourished, in no acute distress  alert and oriented x 3  HEENT:  Pink conjunctivae, Hearing grossly normal.No scleral icterus or conjunctival, moist mucous membranes  Neck: Supple,No JVD, No Carotid Bruit, Thyroid- non tender No cervical lymphadenopathy  Resp: No accessory muscle use, Clear breath sounds, No rales or rhonchi  Card: Regular Rate,Rythm,Normal S1, S2, No murmurs, rubs or gallop. No thrills. MSK: No cyanosis or clubbing, good capillary refill  Skin: No rashes or ulcers, no bruising  Neuro:  Moving all four extremities, no focal deficit, follows commands appropriately  Psych:  Good insight, oriented to person, place and time, alert, Nml Affect  LE: No edema       DATA REVIEW      No specialty comments available. Cardiac monitor: SR/SB        Laboratory and Imaging have been reviewed by me and are notable for  No results for input(s): CPK, CKMB, TROIQ in the last 72 hours.   Recent Labs     09/29/22  0014 09/28/22  0012 09/27/22  0841    140 140   K 4.1 4.0 4.2   CO2 28 26 29   BUN 16 22* 17   CREA 1.02 0.87 1.18   GLU 95 101* 100   MG 2.0 1.9  --    WBC 7.0  --  6.9   HGB 11.6*  --  13.0   HCT 34.9*  --  38.9   *  --  141*

## 2022-09-29 NOTE — DISCHARGE SUMMARY
[facility] [address]  [address]  [phone]    Physician Discharge Summary     Patient ID:  Paula Russell  [id number]  67 y.o.  1945    Admit date: 9/27/2022    Discharge date and time: 9/29/2022 1:00 PM    Admission Diagnoses: Atrial fibrillation with rapid ventricular response [facility]) [I48.91]    Discharge Diagnoses:  Principal Diagnosis <principal problem not specified>                                            Active Problems:    Atrial fibrillation with rapid ventricular response ([address] (9/27/2022)           Hospital Course:     Atrial fibrillation with rapid ventricular response ([facility]) (9/27/2022)/history of paroxysmal A. fib POA: Unclear trigger -possibly GI prep for colonoscopy/ IVVD. TSH wnl.  echo low normal EF but diastolic dysfunction. S/p Cardizem drip spontaneously converted to NSR. Continue beta-blocker - changed to BID. Continue eliquis and monitor platelets. Cardiac monitor on DC. Cardiology evaluated; FU OP      Thrombocytopenia POA: Appears around baseline; light decrease this am likely hemodilutional.  Has seen hematology outpatient and had an extensive work-up. ok for [facility] per hematology. Plan to follow up OP for lab work in 2 weeks. Fu OP      History of hypertension: stopped home lisinopril due to low BPs. Continue beta-blocker BID. FU OP      History of coronary artery disease status post CABG:  Aspirin stopped since now on [facility]      History of colon cancer: Defer further endoscopies to GI; FU OP      Anxiety: Continue Xanax as needed. PCP: Linh Baker MD     Consults: Cardiology and Hematology/Oncology    Significant Diagnostic Studies:     CXR   IMPRESSION  No acute abnormality is identified.     Discharge Exam:  Physical Exam:    Gen:  Well-developed, well-nourished, in no acute distress  HEENT:  Pink conjunctivae, PERRL, hearing intact to voice  Resp:  No accessory muscle use, clear breath sounds without wheezes rales or rhonchi  Card:  RRR, No murmurs, normal S1, S2, no peripheral edema  Abd:  Soft, non-tender, non-distended, normoactive bowel sounds are present  Musc:  No cyanosis or clubbing  Skin:  No rashes or ulcers, skin turgor is good  Neuro:  Cranial nerves 3-12 are grossly intact, follows commands appropriately  Psych:  Oriented to person, place, and time, Alert with good insight    Disposition: home  Discharge Condition: Stable    Patient Instructions:   Current Discharge Medication List        START taking these medications    Details   apixaban (ELIQUIS) 5 mg tablet Take 1 Tablet by mouth two (2) times a day for 90 days. Qty: 60 Tablet, Refills: 2      metoprolol tartrate (LOPRESSOR) 25 mg tablet Take 0.5 Tablets by mouth every twelve (12) hours for 60 days. Qty: 30 Tablet, Refills: 1           CONTINUE these medications which have NOT CHANGED    Details   cholecalciferol (Vitamin D3) (1000 Units /25 mcg) tablet Take 1,000 Units by mouth daily. folic acid/multivit-min/lutein (CENTRUM SILVER PO) Take 1 Tablet by mouth daily. ALPRAZolam (XANAX) 0.5 mg tablet TAKE 1 TABLET NIGHTLY AS   NEEDED FOR ANXIETY. MAXIMUMDAILY AMOUNT: 0.5 MG  Qty: 90 Tablet, Refills: 1    Associated Diagnoses: Anxiety      niacin (NIASPAN) 1,000 mg Tb24 tab TAKE 2 TABLETS NIGHTLY  Qty: 180 Tablet, Refills: 3    Associated Diagnoses: Other hyperlipidemia; Atherosclerosis of native coronary artery of native heart without angina pectoris      Crestor 10 mg tablet TAKE 1 TABLET NIGHTLY  Qty: 90 Tablet, Refills: 3    Associated Diagnoses: Other hyperlipidemia; Atherosclerosis of native coronary artery of native heart without angina pectoris      polyethylene glycol (MIRALAX) 17 gram/dose powder Take 17 g by mouth daily. 1/2 as needed      omega-3 fatty acids-vitamin e 1,000 mg cap Take 1 Cap by mouth two (2) times a day. ascorbic acid, vitamin C, (VITAMIN C) 500 mg tablet Take 500 mg by mouth daily.            STOP taking these medications       lisinopriL (PRINIVIL, ZESTRIL) 5 mg tablet Comments:   Reason for Stopping:         metoprolol succinate (TOPROL-XL) 25 mg XL tablet Comments:   Reason for Stopping:         Aspirin, Buffered 81 mg tab Comments:   Reason for Stopping:             Activity: Activity as tolerated  Diet: Cardiac Diet  Wound Care: None needed    Follow-up with  Follow-up Information       Follow up With Specialties Details Why Contact Info    Annabella Price NP Nurse Practitioner Follow up on 10/11/2022 10:40 am [address] [facility]  [address]  [id number] [facility]      Vineet Babcock MD Internal Medicine Physician Schedule an appointment as soon as possible for a visit in 1 week(s) Hospital Follow Up [facility] [address] [facility]  Critical access hospital 99 [address]      Greg Perez MD Hematology and Oncology, Internal Medicine Physician, Hematology Physician, Oncology Go in 2 week(s) You will go to your hematologist office for lab work in 2 weeks; please contact them for appointment date/time. [place] [address]  [phone]      Renate Kaiser MD Gastroenterology Schedule an appointment as soon as possible for a visit Follow Up [id number] [place] [address] Road  [phone]              Follow-up tests/labs as above.      Signed:  Mirna Rojas DO  9/29/2022  1:00 PM  **I personally spent 35 min on discharge**

## 2022-09-29 NOTE — TELEPHONE ENCOUNTER
Enrolled with [employer] - Ordered and being shipped to patient's home address on file. ETA within 5-7 business days. Aysha Mckeon  : 43  MRN [id number]  CARDIAC EVENT MONITOR-14 days  Ordering provider: Sal Worthington  Reason for exam: Bradycardia, a-fib      Order received from [facility]. Results will go to Dr Merlin Vang as that is his regular cardiologist in the office.

## 2022-09-29 NOTE — PROGRESS NOTES
Problem: Falls - Risk of  Goal: *Absence of Falls  Description: Document Karen Ahumada Fall Risk and appropriate interventions in the flowsheet.   Outcome: Resolved/Met  Note: Fall Risk Interventions:            Medication Interventions: Bed/chair exit alarm, Evaluate medications/consider consulting pharmacy    Elimination Interventions: Bed/chair exit alarm, Call light in reach, Patient to call for help with toileting needs, Toileting schedule/hourly rounds    History of Falls Interventions: Bed/chair exit alarm, Consult care management for discharge planning, Door open when patient unattended, Evaluate medications/consider consulting pharmacy         Problem: Patient Education: Go to Patient Education Activity  Goal: Patient/Family Education  Outcome: Resolved/Met     Problem: Patient Education: Go to Patient Education Activity  Goal: Patient/Family Education  Outcome: Resolved/Met     Problem: Afib Pathway: Day 1  Goal: Off Pathway (Use only if patient is Off Pathway)  Outcome: Resolved/Met  Goal: Activity/Safety  Outcome: Resolved/Met  Goal: Consults, if ordered  Outcome: Resolved/Met  Goal: Diagnostic Test/Procedures  Outcome: Resolved/Met  Goal: Nutrition/Diet  Outcome: Resolved/Met  Goal: Discharge Planning  Outcome: Resolved/Met  Goal: Medications  Outcome: Resolved/Met  Goal: Respiratory  Outcome: Resolved/Met  Goal: Treatments/Interventions/Procedures  Outcome: Resolved/Met  Goal: Psychosocial  Outcome: Resolved/Met  Goal: *Optimal pain control at patient's stated goal  Outcome: Resolved/Met  Goal: *Hemodynamically stable  Outcome: Resolved/Met  Goal: *Stable cardiac rhythm  Outcome: Resolved/Met  Goal: *Lungs clear or at baseline  Outcome: Resolved/Met  Goal: *Labs within defined limits  Outcome: Resolved/Met  Goal: *Describes available resources and support systems  Outcome: Resolved/Met     Problem: Afib Pathway: Day 2  Goal: Off Pathway (Use only if patient is Off Pathway)  Outcome: Resolved/Met  Goal: Activity/Safety  Outcome: Resolved/Met  Goal: Consults, if ordered  Outcome: Resolved/Met  Goal: Diagnostic Test/Procedures  Outcome: Resolved/Met  Goal: Nutrition/Diet  Outcome: Resolved/Met  Goal: Discharge Planning  Outcome: Resolved/Met  Goal: Medications  Outcome: Resolved/Met  Goal: Respiratory  Outcome: Resolved/Met  Goal: Treatments/Interventions/Procedures  Outcome: Resolved/Met  Goal: Psychosocial  Outcome: Resolved/Met  Goal: *Hemodynamically stable  Outcome: Resolved/Met  Goal: *Optimal pain control at patient's stated goal  Outcome: Resolved/Met  Goal: *Stable cardiac rhythm  Outcome: Resolved/Met  Goal: *Lungs clear or at baseline  Outcome: Resolved/Met  Goal: *Describes available resources and support systems  Outcome: Resolved/Met     Problem: Afib Pathway: Day 3  Goal: Off Pathway (Use only if patient is Off Pathway)  Outcome: Resolved/Met  Goal: Activity/Safety  Outcome: Resolved/Met  Goal: Diagnostic Test/Procedures  Outcome: Resolved/Met  Goal: Nutrition/Diet  Outcome: Resolved/Met  Goal: Discharge Planning  Outcome: Resolved/Met  Goal: Medications  Outcome: Resolved/Met  Goal: Respiratory  Outcome: Resolved/Met  Goal: Treatments/Interventions/Procedures  Outcome: Resolved/Met  Goal: Psychosocial  Outcome: Resolved/Met     Problem: Afib: Discharge Outcomes (not in CAT)  Goal: *Hemodynamically stable  Outcome: Resolved/Met  Goal: *Stable cardiac rhythm  Outcome: Resolved/Met  Goal: *Lungs clear or at baseline  Outcome: Resolved/Met  Goal: *Optimal pain control at patient's stated goal  Outcome: Resolved/Met  Goal: *Identifies cardiac risk factors  Outcome: Resolved/Met  Goal: *Verbalizes home exercise program, activity guidelines, cardiac precautions  Outcome: Resolved/Met  Goal: *Verbalizes understanding and describes prescribed diet  Outcome: Resolved/Met  Goal: *Verbalizes understanding and describes medication purposes and frequencies  Outcome: Resolved/Met  Goal: *Anxiety reduced or absent  Outcome: Resolved/Met  Goal: *Understands and describes signs and symptoms to report to providers(Stroke Metric)  Outcome: Resolved/Met  Goal: *Describes follow-up/return visits to physicians  Outcome: Resolved/Met  Goal: *Describes available resources and support systems  Outcome: Resolved/Met  Goal: *Influenza immunization  Outcome: Resolved/Met  Goal: *Pneumococcal immunization  Outcome: Resolved/Met  Goal: *Describes smoking cessation resources  Outcome: Resolved/Met

## 2022-09-29 NOTE — PROGRESS NOTES
[address] [facility] at [facility]  [phone]    Hematology / Oncology Established Visit    Reason for Visit:   Ramirez Seymour is a 67 y.o. male who is seen for follow up of thrombocytopenia. History of Present Illness:   Ramirez Seymour is a 67 y.o. male with CAD s/p CABG, h/o colon cancer and chronic thrombocytopenia currently hospitalized for A-fib with RVR. Patient was going for his scheduled colonoscopy and was found to be in A-fib with RVR. He was referred to ED. Pt reports prior h/o irregular heart rhythm in approximately 2007, but he cannot recall details of treatment. He states that he has been taking ASA 81mg/d for several months to years. He denies any h/o melena/hematochezia, hematuria. Has never had bleeding complications. Of note, he is followed in our outpatient HemOnc clinic and has had workup for chronic thrombocytopenia. Interval History:  Pt was started on Eliquis. This morning he is quite agitated and states he wants to be discharged home. He denies any bruising/bleeding. Wife is at bedside. Allergies   Allergen Reactions    Rosuvastatin Myalgia     Generic rosuvastatin causes myalgias        Review of Systems: A complete review of systems was obtained, negative except as described above. Physical Exam:   Visit Vitals  /61 (BP 1 Location: Right upper arm, BP Patient Position: At rest)   Pulse (!) 57   Temp 98.3 °F (36.8 °C)   Resp 17   Ht 5' 7\" (1.702 m)   Wt 147 lb 0.8 oz (66.7 kg)   SpO2 95%   BMI 23.03 kg/m²     ECOG PS: 1  General: no distress  Eyes: anicteric sclerae  HENT: oropharynx clear  Neck: supple  Lymphatic: no cervical, supraclavicular adenopathy  Respiratory: normal respiratory effort  CV: Bradycardic, but normal rhythm.  no peripheral edema  GI: soft, nontender, nondistended, no masses  Skin: no rashes; no ecchymoses; no petechiae        Results:     Lab Results   Component Value Date/Time    WBC 7.0 09/29/2022 12:14 AM    HGB 11.6 (L) 09/29/2022 12:14 AM    HCT 34.9 (L) 09/29/2022 12:14 AM    PLATELET 195 (L) 53/50/6142 12:14 AM    .9 (H) 09/29/2022 12:14 AM    ABS. NEUTROPHILS 4.7 09/29/2022 12:14 AM     Lab Results   Component Value Date/Time    Sodium 142 09/29/2022 12:14 AM    Potassium 4.1 09/29/2022 12:14 AM    Chloride 112 (H) 09/29/2022 12:14 AM    CO2 28 09/29/2022 12:14 AM    Glucose 95 09/29/2022 12:14 AM    BUN 16 09/29/2022 12:14 AM    Creatinine 1.02 09/29/2022 12:14 AM    GFR est AA >60 09/29/2022 12:14 AM    GFR est non-AA >60 09/29/2022 12:14 AM    Calcium 8.6 09/29/2022 12:14 AM     Lab Results   Component Value Date/Time    Bilirubin, total 0.8 09/27/2022 08:41 AM    ALT (SGPT) 28 09/27/2022 08:41 AM    Alk. phosphatase 73 09/27/2022 08:41 AM    Protein, total 6.6 09/27/2022 08:41 AM    Albumin 3.5 09/27/2022 08:41 AM    Globulin 3.1 09/27/2022 08:41 AM     Lab Results   Component Value Date/Time    Vitamin B12 1,604 (H) 03/17/2021 09:29 AM    Folate 63.4 (H) 03/17/2021 09:29 AM       Imaging:     Reviewed    Assessment and Recommendations:   Shira Hua is a 67 y.o. male with CAD s/p CABG, h/o colon cancer and chronic thrombocytopenia is hospitalized with AF with RVR. 1. Chronic thrombocytopenia:   Chronic since at least 2013. No B12, folate deficiency. Hep B panel, HCV, HIV negative. UPEP negative. No medication culprits. No B symptoms. No EtOH abuse or known liver disease. Given concurrent macrocytosis, it is possible that patient has MDS (Myelodysplastic syndrome). I discussed with patient that MDS can cause a sluggish bone marrow with low counts. However, it does not require treatment unless PLT count remains persistently below 80-90 range. Therefore, I recommend monitoring his PLT count periodically 2-3 times a year. I recommend delaying bone marrow biopsy until the future if needed.  No contraindication to anticoagulation unless PLT count declines to < 50.   -- Outpatient HemOnc follow up already scheduled for Nov 2022. -- Advised pt to get labs done in 7-10 days from hospital discharge. -- Can see me sooner if PLT drop < 100 or Hgb < 10.      2. Atrial fibrillation with RVR / CAD s/p PCI And CABG:  Cardiology evaluated and started pt on Eliquis and resumed Metoprolol. Lisinopril discontinued. No Hematologic contraindication to using anticoagulation such as Eliquis as patient's PLT count remains consistently above 50. Pt had 1 episode of PLT count 89 in 2015, but was higher than 100 otherwise. 3. H/o colon cancer:   Surgical resection in 2001. No melena/hematochezia. Will need to reschedule surveillance colonoscopy in near future. 4.  Sinus bradycardia: 2/2 Metoprolol. 5. Macrocytosis without anemia:  No B12 or folate deficiency. Could be due to MDS. Will monitor.      Signed By: Con Vigil MD     September 29, 2022

## 2022-09-29 NOTE — PROGRESS NOTES
Spiritual Care Assessment/Progress Note  [address]      NAME: Demetra Lord      MRN: [id number]  AGE: 67 y.o.  SEX: male  Lutheran Affiliation: Episcopal   Language: English     9/29/2022     Total Time (in minutes): 5     Spiritual Assessment begun in SF 3 PROG CARE TELE 2 through conversation with:         [x]Patient        [] Family    [] Friend(s)        Reason for Consult: [facility]     Spiritual beliefs: (Please include comment if needed)     [x] Identifies with a amandeep tradition:  Episcopal       [] Supported by a amandeep community:            [] Claims no spiritual orientation:           [] Seeking spiritual identity:                [] Adheres to an individual form of spirituality:           [] Not able to assess:                           Identified resources for coping:      [x] Prayer                               [x] Music                  [] Guided Imagery     [x] Family/friends                 [] Pet visits     [] Devotional reading                         [] Unknown     [] Other:                                               Interventions offered during this visit: (See comments for more details)    Patient Interventions: Affirmation of amandeep, Communion (Episcopal), Initial/Spiritual assessment, patient floor, Prayer (actual), Prayer (assurance of)           Plan of Care:     [x] Support spiritual and/or cultural needs    [] Support AMD and/or advance care planning process      [] Support grieving process   [] Coordinate Rites and/or Rituals    [] Coordination with community clergy   [] No spiritual needs identified at this time   [] Detailed Plan of Care below (See Comments)  [] Make referral to Music Therapy  [] Make referral to Pet Therapy     [] Make referral to Addiction services  [] Make referral to [facility]  [] Make referral to Spiritual Care Partner  [] No future visits requested        [] Contact Spiritual Care for further referrals     Comments: Mr. Amey Lesch was up and walking around his room. He is waiting to go home. He declined communion today and was okay with Fr. Rosenthal's visit. Assured of prayer and his intention for discharge.     KAREN Purvis, RN, ACSW, LCSW   Page:  [phone]

## 2022-09-29 NOTE — DISCHARGE INSTRUCTIONS
HOSPITALIST DISCHARGE INSTRUCTIONS  NAME: Ramirez Seymour   :  1945   MRN:  [id number]     Date/Time:  2022 8:00 PM    ADMIT DATE: 2022     DISCHARGE DATE: 2022     ADMITTING DIAGNOSIS:  Atrial Fibrillation     DISCHARGE DIAGNOSIS:  Atrial Fibrillation      Atrial Fibrillation: Care Instructions  Your Care Instructions     Atrial fibrillation is an irregular and often fast heartbeat. Treating this condition is important for several reasons. It can cause blood clots, which can travel from your heart to your brain and cause a stroke. If you have a fast heartbeat, you may feel lightheaded, dizzy, and weak. An irregular heartbeat can also increase your risk for heart failure. Atrial fibrillation is often the result of another heart condition, such as high blood pressure or coronary artery disease. Making changes to improve your heart condition will help you stay healthy and active. Follow-up care is a key part of your treatment and safety. Be sure to make and go to all appointments, and call your doctor if you are having problems. It's also a good idea to know your test results and keep a list of the medicines you take. How can you care for yourself at home? Medicines    Take your medicines exactly as prescribed. Call your doctor if you think you are having a problem with your medicine. You will get more details on the specific medicines your doctor prescribes. If your doctor has given you a blood thinner to prevent a stroke, be sure you get instructions about how to take your medicine safely. Blood thinners can cause serious bleeding problems. Do not take any vitamins, over-the-counter drugs, or herbal products without talking to your doctor first.   Lifestyle changes    Do not smoke. Smoking can increase your chance of a stroke and heart attack. If you need help quitting, talk to your doctor about stop-smoking programs and medicines.  These can increase your chances of quitting for good.     Eat a heart-healthy diet. Stay at a healthy weight. Lose weight if you need to. Limit alcohol to 2 drinks a day for men and 1 drink a day for women. Too much alcohol can cause health problems. Avoid colds and flu. Get a pneumococcal vaccine shot. If you have had one before, ask your doctor whether you need another dose. Get a flu shot every year. If you must be around people with colds or flu, wash your hands often. Activity    If your doctor recommends it, get more exercise. Walking is a good choice. Bit by bit, increase the amount you walk every day. Try for at least 30 minutes on most days of the week. You also may want to swim, bike, or do other activities. Your doctor may suggest that you join a cardiac rehabilitation program so that you can have help increasing your physical activity safely. Start light exercise if your doctor says it is okay. Even a small amount will help you get stronger, have more energy, and manage stress. Walking is an easy way to get exercise. Start out by walking a little more than you did in the hospital. Gradually increase the amount you walk. When you exercise, watch for signs that your heart is working too hard. You are pushing too hard if you cannot talk while you are exercising. If you become short of breath or dizzy or have chest pain, sit down and rest immediately. Check your pulse regularly. Place two fingers on the artery at the palm side of your wrist, in line with your thumb. If your heartbeat seems uneven or fast, talk to your doctor. When should you call for help? Call 911 anytime you think you may need emergency care. For example, call if:    You have symptoms of a heart attack. These may include:  Chest pain or pressure, or a strange feeling in the chest.  Sweating. Shortness of breath. Nausea or vomiting.   Pain, pressure, or a strange feeling in the back, neck, jaw, or upper belly or in one or both shoulders or arms.  Lightheadedness or sudden weakness. A fast or irregular heartbeat. After you call 911, the  may tell you to chew 1 adult-strength or 2 to 4 low-dose aspirin. Wait for an ambulance. Do not try to drive yourself. You have symptoms of a stroke. These may include:  Sudden numbness, tingling, weakness, or loss of movement in your face, arm, or leg, especially on only one side of your body. Sudden vision changes. Sudden trouble speaking. Sudden confusion or trouble understanding simple statements. Sudden problems with walking or balance. A sudden, severe headache that is different from past headaches. You passed out (lost consciousness). Call your doctor now or seek immediate medical care if:    You have new or increased shortness of breath. You feel dizzy or lightheaded, or you feel like you may faint. Your heart rate becomes irregular. You can feel your heart flutter in your chest or skip heartbeats. Tell your doctor if these symptoms are new or worse. Watch closely for changes in your health, and be sure to contact your doctor if you have any problems. Where can you learn more? Go to http://www.gray.com/  Enter U020 in the search box to learn more about \"Atrial Fibrillation: Care Instructions. \"  Current as of: January 10, 2022               Content Version: 13.2  © 2006-2022 [employer]. Care instructions adapted under license by [employer] (which disclaims liability or warranty for this information). If you have questions about a medical condition or this instruction, always ask your healthcare professional. [place] [address] any warranty or liability for your use of this information. MEDICATIONS:     It is important that you take the medication exactly as they are prescribed.    Keep your medication in the bottles provided by the pharmacist and keep a list of the medication names, dosages, and times to be taken in your wallet. Do not take other medications without consulting your doctor     Pain Management: per above medications    What to do at Home    Recommended diet:  Cardiac Diet    Recommended activity: Activity as tolerated    1) Return to the hospital if you feel worse    2) If you experience any of the following symptoms then please call your primary care physician or return to the emergency room if you cannot get hold of your doctor:  Fever, chills, nausea, vomiting, diarrhea, change in mentation, falling, bleeding, shortness of breath, chest pain, severe headache, severe abdominal pain. Follow Up:  Jose Luis James NP  [address]  [facility]  [facility]  [phone]  Follow up on 10/11/2022  60:83 am    Prince Randall, [address] . Harish [address] [place] [address]  [phone]  Schedule an appointment as soon as possible for a visit in 1 week(s)  Hospital Follow Up    Sandi Moss Figures, [facility]  [place] [address] [facility] [place]  [phone]  GO to   You will go to your hematologist office for lab work in 7-10 days; please contact them for appointment date/time. Please follow per office instructions. Odette Lin, [address] [place] [address] Place  [phone]  Schedule an appointment as soon as possible for a visit   Follow Up   . Information obtained by :  I understand that if any problems occur once I am at home I am to contact my physician. I understand and acknowledge receipt of the instructions indicated above.                                                                                                                                            Physician's or R.N.'s Signature                                                                  Date/Time                                                                                                                                              Patient or Representative Signature                                                          Date/Time

## 2022-09-29 NOTE — PROGRESS NOTES
0700- Bedside and Verbal shift change report given to WARREN Gagnon  (oncoming nurse) by [address], RN  (offgoing nurse). Report included the following information SBAR, Kardex, Intake/Output, MAR, Recent Results, Cardiac Rhythm sinus donald, and Quality Measures. Primary Nurse Azam Boston and [facility], RN performed a dual skin assessment on this patient Impairment noted- see wound doc flow sheet  Isreal score is see flowsheet. 0800- Assessment completed, see flow sheet. 7035- Dr. Maik Driscoll updated on bradycardia with HR in 40s, stable BP, asymptomatic and received metoprolol last night. Orders to update cardiology group, anticipate discharge later today. 0798- Cardiology group messaged via [address] [place] [address] regarding bradycardia. 0900- Dr. Maik Driscoll present at bedside assessing patient. Orders for patient to shower, likely to discharge home once cleared by Cardiology. 1303 Evelyn Pina NP present at bedside assessing patient. 1105- Reassessment completed, see flow sheet. 46- Dr. Maik Driscoll updated that cardiology has seen patient and recommending cardiac monitoring for discharge. Per Dr. Maik Driscoll now waiting on Hemeotology. 200- Dr. Sarah Leos updated on pending discharge today. Per Dr. Sarah Leos, pt will need to follow up for lab work in 2 weeks and that patient has a prescheduled appointment to see her in November. 1251- Pt becoming increasing frustrated that he does not have discharge orders. RN attempted to inform patient that it does take time in coordinating with doctors, getting his monitor, etc. Pt became even more agitated and is requesting to speak with MD. Dr. Maik Driscoll updated. [address] with EKG technician regarding Cardiac Event Monitor as soon as possible. [phone]- Return call from EKG technician, Cardiac Monitor will be shipped to patient's home. 9387- Spoke with Arlen GUILLEN, no further obstacles to patient discharge.      7079- Pt in own clothing, PIV removed, continuous monitoring removed. Copy of discharge paperwork and educational documents on new medications provided to patient. I have reviewed discharge instructions with the patient and spouse. The patient and spouse verbalized understanding. All questions answered to satisfaction. Pt signed copy of discharge papers as E signature pad not working, signed copy placed in patient chart. Pt declined wheelchair assistance to car and requested to walk with wife. No further needs at this time.

## 2022-10-03 ENCOUNTER — OFFICE VISIT (OUTPATIENT)
Dept: INTERNAL MEDICINE CLINIC | Age: 77
End: 2022-10-03
Payer: MEDICARE

## 2022-10-03 VITALS
RESPIRATION RATE: 14 BRPM | HEIGHT: 67 IN | HEART RATE: 54 BPM | OXYGEN SATURATION: 93 % | WEIGHT: 150 LBS | DIASTOLIC BLOOD PRESSURE: 65 MMHG | TEMPERATURE: 98.1 F | BODY MASS INDEX: 23.54 KG/M2 | SYSTOLIC BLOOD PRESSURE: 140 MMHG

## 2022-10-03 DIAGNOSIS — D69.6 THROMBOCYTOPENIA (HCC): ICD-10-CM

## 2022-10-03 DIAGNOSIS — N40.1 BPH WITH OBSTRUCTION/LOWER URINARY TRACT SYMPTOMS: ICD-10-CM

## 2022-10-03 DIAGNOSIS — I48.0 PAROXYSMAL ATRIAL FIBRILLATION (HCC): Primary | ICD-10-CM

## 2022-10-03 DIAGNOSIS — N13.8 BPH WITH OBSTRUCTION/LOWER URINARY TRACT SYMPTOMS: ICD-10-CM

## 2022-10-03 DIAGNOSIS — I10 ESSENTIAL HYPERTENSION: ICD-10-CM

## 2022-10-03 DIAGNOSIS — Z09 HOSPITAL DISCHARGE FOLLOW-UP: ICD-10-CM

## 2022-10-03 PROCEDURE — 1111F DSCHRG MED/CURRENT MED MERGE: CPT | Performed by: INTERNAL MEDICINE

## 2022-10-03 PROCEDURE — 99495 TRANSJ CARE MGMT MOD F2F 14D: CPT | Performed by: INTERNAL MEDICINE

## 2022-10-03 PROCEDURE — G8427 DOCREV CUR MEDS BY ELIG CLIN: HCPCS | Performed by: INTERNAL MEDICINE

## 2022-10-03 NOTE — ASSESSMENT & PLAN NOTE
Admitted for afib with RVR found prior to colonoscopy   Admit date: 9/27/2022     Discharge date and time: 9/29/2022 1:00 PM     Atrial fibrillation with rapid ventricular response (HCC) (9/27/2022)/history of paroxysmal A. fib POA: Unclear trigger -possibly GI prep for colonoscopy/ IVVD. TSH wnl.  echo low normal EF but diastolic dysfunction. S/p Cardizem drip spontaneously converted to NSR. Continue beta-blocker - changed to BID. Continue eliquis and monitor platelets. Cardiac monitor on DC. Cardiology evaluated; FU OP   ===> BID dosing due to donald     Episode in 2007 - was getting house ready to sell, was doing a lot of labor, went to hospital with afib     Currently no CP, SOB, palpitations, lightheadedness  Occl dizzy   No nose bleeds, gum bleeding, blood in urine/stool    Follow up with cards, continue metoprolol 12.5mg BID - will clarify if he needs to hold dose with cards based on a certain HR threshold.   Now on eliquis, continue   Cards planning for monitor

## 2022-10-03 NOTE — PROGRESS NOTES
Assessment and Plan     1. Paroxysmal atrial fibrillation [facility])  Assessment & Plan:  Admitted for afib with RVR found prior to colonoscopy   Admit date: 9/27/2022     Discharge date and time: 9/29/2022 1:00 PM     Atrial fibrillation with rapid ventricular response (HCC) (9/27/2022)/history of paroxysmal A. fib POA: Unclear trigger -possibly GI prep for colonoscopy/ IVVD. TSH wnl.  echo low normal EF but diastolic dysfunction. S/p Cardizem drip spontaneously converted to NSR. Continue beta-blocker - changed to BID. Continue eliquis and monitor platelets. Cardiac monitor on DC. Cardiology evaluated; FU OP   ===> BID dosing due to donald     Episode in 2007 - was getting house ready to sell, was doing a lot of labor, went to hospital with afib     Currently no CP, SOB, palpitations, lightheadedness  Occl dizzy   No nose bleeds, gum bleeding, blood in urine/stool    Follow up with cards, continue metoprolol 12.5mg BID - will clarify if he needs to hold dose with cards based on a certain HR threshold. Now on eliquis, continue   Cards planning for monitor  2. Thrombocytopenia ([facility]  Assessment & Plan:  Plan to repeat labs later this week  Refer back to heme onc if plt <100 or hgb <10  Orders:  -     CBC WITH AUTOMATED DIFF; Future  3. BPH with obstruction/lower urinary tract symptoms  Assessment & Plan:   Saw urology after last visit- stop fluids 2 hours prior to bedtime. if doesn't work, voiding diary and follow u  4. Essential hypertension  Assessment & Plan:  Admitted for afib with rvr, lisinopril 5 stopped on discharge  Slightly elevated today but mostly 120s-130s at home  Continue off lisinopril for now, continue metop 12.5mg BID (switched from XL while he was in the hospital due to donald)     Benefits, risks, possible drug interactions, and side effects of all new medications were reviewed with the patient. Pt verbalized understanding.     Return to clinic:  in march for thrombocytopenia    An electronic signature was used to authenticate this note. Juanita Todd MD  [facility]  10/3/2022    Future Appointments   Date Time Provider Parkash Mckinley   10/11/2022 10:40 AM Jose Martinez NP [facility] BS AMB   10/17/2022  8:30 AM [facility] 2 [facility]   11/10/2022  9:30 AM Mago Benitez MD [facility] BS AMB   0/2/7920  7:47 AM Daren Doll MD [facility] BS AMB   5/5/2023  9:00 AM Cynthia Villa MD [facility] BS AMB        History of Present Illness   Chief Complaint   AZRA Ruiz is a 67 y.o. male         Review of Systems   Constitutional:  Negative for chills and fever. Respiratory:  Negative for shortness of breath. Cardiovascular:  Negative for chest pain. Past Medical History     Allergies   Allergen Reactions    Rosuvastatin Myalgia     Generic rosuvastatin causes myalgias        Current Outpatient Medications   Medication Sig    apixaban (ELIQUIS) 5 mg tablet Take 1 Tablet by mouth two (2) times a day for 90 days. metoprolol tartrate (LOPRESSOR) 25 mg tablet Take 0.5 Tablets by mouth every twelve (12) hours for 60 days. cholecalciferol (VITAMIN D3) (1000 Units /25 mcg) tablet Take 1,000 Units by mouth daily. folic acid/multivit-min/lutein (CENTRUM SILVER PO) Take 1 Tablet by mouth daily. ALPRAZolam (XANAX) 0.5 mg tablet TAKE 1 TABLET NIGHTLY AS   NEEDED FOR ANXIETY. MAXIMUMDAILY AMOUNT: 0.5 MG    niacin (NIASPAN) 1,000 mg Tb24 tab TAKE 2 TABLETS NIGHTLY    Crestor 10 mg tablet TAKE 1 TABLET NIGHTLY    polyethylene glycol (MIRALAX) 17 gram/dose powder Take 17 g by mouth daily. 1/2 as needed    omega-3 fatty acids-vitamin e 1,000 mg cap Take 1 Cap by mouth two (2) times a day. ascorbic acid, vitamin C, (VITAMIN C) 500 mg tablet Take 500 mg by mouth daily. No current facility-administered medications for this visit.           Patient Active Problem List   Diagnosis Code    History of colon cancer Z85.038    Thrombocytopenia ([facility] D69.6    Adenomatous polyp of transverse colon D12.3    Insomnia G47.00    Atherosclerosis of native coronary artery of native heart without angina pectoris I25.10    Essential hypertension I10    History of myocardial infarction I25.2    Hyperlipidemia E78.5    Tachycardia R00.0    BPH with obstruction/lower urinary tract symptoms N40.1, N13.8    Cognitive changes R41.89    Pulmonary nodules R91.8    History of benign spinal cord tumor Z86.018    Carpal tunnel syndrome on left G56.02    Hx of CABG Z95.1    Anxiety F41.9    Neuropathy G62.9    Constipation, unspecified constipation type K59.00    Tobacco use Z72.0    Medicare annual wellness visit, subsequent Z00.00    Peripheral vascular disease, unspecified ([facility] I73.9    Prediabetes R73.03    Paroxysmal [facility] fibrillation ([facility] I48.0     Past Surgical History:   Procedure Laterality Date    COLONOSCOPY N/A 5/9/2019    COLONOSCOPY performed by King Purnima MD at [facility]    caused chronic pain L arm from nerve block    HX CATARACT REMOVAL Bilateral     HX CORONARY ARTERY BYPASS GRAFT  1999    cabg x 3    HX HEART CATHETERIZATION      multiple stents    HX HERNIA REPAIR      bilateral inguinal    HX LUMBAR DISKECTOMY  06/2018    Dr Antoine Vega.  s/p spinal tumor resection    HX TONSILLECTOMY      HX TOTAL COLECTOMY  2001    partial    HX TURP  2008      Social History     Tobacco Use    Smoking status: Former     Packs/day: 1.00     Years: 50.00     Pack years: 50.00     Types: Cigarettes     Quit date: 5/18/2012     Years since quitting: 10.3    Smokeless tobacco: Never   Substance Use Topics    Alcohol use: Not Currently     Comment: usually 3 drinks week, but not as much with covid, occasionally      Family History   Problem Relation Age of Onset    Heart Disease Father     Diabetes Mother     Heart Disease Paternal Grandfather     Cancer Maternal Grandmother         pancreatic        Physical Exam   Vitals: Visit Vitals  BP (!) 140/65 (BP 1 Location: Left upper arm, BP Patient Position: Sitting, BP Cuff Size: Small adult)   Pulse (!) 54   Temp 98.1 °F (36.7 °C) (Oral)   Resp 14   Ht 5' 7\" (1.702 m)   Wt 150 lb (68 kg)   SpO2 93%   BMI 23.49 kg/m²        Physical Exam  Constitutional:       Appearance: Normal appearance. He is not ill-appearing. Cardiovascular:      Rate and Rhythm: Normal rate and regular rhythm. Heart sounds: No murmur heard. No friction rub. No gallop. Pulmonary:      Effort: No respiratory distress. Breath sounds: Normal breath sounds. No wheezing, rhonchi or rales. Neurological:      Mental Status: He is alert.

## 2022-10-03 NOTE — ASSESSMENT & PLAN NOTE
Saw urology after last visit- stop fluids 2 hours prior to bedtime.  if doesn't work, voiding diary and follow u

## 2022-10-03 NOTE — ASSESSMENT & PLAN NOTE
Admitted for afib with rvr, lisinopril 5 stopped on discharge  Slightly elevated today but mostly 120s-130s at home  Continue off lisinopril for now, continue metop 12.5mg BID (switched from XL while he was in the hospital due to donald)

## 2022-10-05 ENCOUNTER — TELEPHONE (OUTPATIENT)
Dept: CARDIOLOGY CLINIC | Age: 77
End: 2022-10-05

## 2022-10-05 NOTE — TELEPHONE ENCOUNTER
Patient states he received the heart monitor, he is wearing the monitor however he does not know the length of time he should wear it, as well as any other pertinent information regarding the monitor.         VWOAB:[phone] or [phone]

## 2022-10-06 ENCOUNTER — TELEPHONE (OUTPATIENT)
Dept: CARDIOLOGY CLINIC | Age: 77
End: 2022-10-06

## 2022-10-06 NOTE — TELEPHONE ENCOUNTER
Patient came into the office at [address] [place] [address], [address], inquiring about how he can message [facility] on MyChart. I informed him that he did have access to MyChart and Dr. Katja Aguirre was included on his care team, that he should be able to message him. He stated that he can get on MyChart and message all of his other providers but not \"[facility]\". I explained to him that I have done all I can do on my part, that he would need to contact [employer] help desk. He complained that he has called the number several times and was unsuccessful. He was very irritated and stated that, \"you all should know how to fix it, you work here\". He then stated that he should be able to message Anderson Rogers NP since he is seeing her next week. I told him that I would add her to his care team and then he can see if it will allow him to send her a message. He then left to check his MyChart at home from his computer, as he does not have the milagro on his phone.

## 2022-10-06 NOTE — TELEPHONE ENCOUNTER
Called pt about his monitor he received in the mail after his hosp visit 9/27-9/29. He asked once again how long he needed to wear the monitor & I informed him again that it is for 14 days. Pt very upset he was sent home from the hospital without having monitor applied & explained. He said he was only told he would be getting one. He was upset it was just left at his door by [employer] & not given directly to him. Pt stated he has started wearing the monitor but it was \"unacceptable that no one came to see him in the hospital to apply the monitor when we are just 3 floors up. \"  Explained to him that we can not go to the hosp to apply monitors as we are 2 separate dept & it is not allowed. Pt kept saying he saw multi providers from our office. His next complaint is he cant get on MyChart. I told him that I tried to help him when I talked to him the other day but was unable to get it to work so he will probably need to call help desk. He stated \"well if you cant help me you better find someone that can. \"      Pt started to demand why has Emilia Rodriguez NP not called him yet after 3 days. Informed him that he did not ask to speak to her when I talked to him the other day. I asked what it was in regards to & he stated \" I will discuss it with her not you. \"  Told pt I will have Petar Hough call him at verified # & he hung up on me.       Please call pt at [phone]

## 2022-10-06 NOTE — TELEPHONE ENCOUNTER
Ivette Obregon - see below; pls call pt to address his concerns. I'm happy to call following if he has any care / treatment related questions. I haven't met him but he is scheduled for fu on 10/11.

## 2022-10-07 LAB
BASOPHILS # BLD AUTO: 0 X10E3/UL (ref 0–0.2)
BASOPHILS NFR BLD AUTO: 1 %
EOSINOPHIL # BLD AUTO: 0.1 X10E3/UL (ref 0–0.4)
EOSINOPHIL NFR BLD AUTO: 1 %
ERYTHROCYTE [DISTWIDTH] IN BLOOD BY AUTOMATED COUNT: 11.9 % (ref 11.6–15.4)
HCT VFR BLD AUTO: 41.4 % (ref 37.5–51)
HGB BLD-MCNC: 13.7 G/DL (ref 13–17.7)
IMM GRANULOCYTES # BLD AUTO: 0 X10E3/UL (ref 0–0.1)
IMM GRANULOCYTES NFR BLD AUTO: 0 %
LYMPHOCYTES # BLD AUTO: 1.6 X10E3/UL (ref 0.7–3.1)
LYMPHOCYTES NFR BLD AUTO: 26 %
MCH RBC QN AUTO: 33.2 PG (ref 26.6–33)
MCHC RBC AUTO-ENTMCNC: 33.1 G/DL (ref 31.5–35.7)
MCV RBC AUTO: 100 FL (ref 79–97)
MONOCYTES # BLD AUTO: 0.7 X10E3/UL (ref 0.1–0.9)
MONOCYTES NFR BLD AUTO: 12 %
NEUTROPHILS # BLD AUTO: 3.6 X10E3/UL (ref 1.4–7)
NEUTROPHILS NFR BLD AUTO: 60 %
PLATELET # BLD AUTO: 144 X10E3/UL (ref 150–450)
RBC # BLD AUTO: 4.13 X10E6/UL (ref 4.14–5.8)
WBC # BLD AUTO: 6 X10E3/UL (ref 3.4–10.8)

## 2022-10-07 NOTE — PROGRESS NOTES
Physician Progress Note      Joshua Villalobos  CSN #:                  [id number]  :                       1945  ADMIT DATE:       2022 9:32 AM  DISCH DATE:        2022 2:05 PM  RESPONDING  PROVIDER #:        Michael BURKS DO          QUERY TEXT:    Patient admitted with A-fib with RVR, noted to have started Eliquis 5 mg 2 times daily prior to discharge. If possible, please document in progress notes and discharge summary if you are evaluating and/or treating any of the following: The medical record reflects the following:  Risk Factors: A-fib with RVR, HTN, >76years old  Clinical Indicators: pt admitted with A-fib with RVR  - started on Eliquis 5 mg 2 times daily prior to d/c  Treatment: Eliquis 5 mg 2 times daily      Thank you,    Deng Catalan RN  CDI  Options provided:  -- Secondary hypercoagulable state in a patient with atrial fibrillation  -- Other - I will add my own diagnosis  -- Disagree - Not applicable / Not valid  -- Disagree - Clinically unable to determine / Unknown  -- Refer to Clinical Documentation Reviewer    PROVIDER RESPONSE TEXT:    This patient has secondary 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     Query created by: French Soliz on 10/4/2022 11:16 AM      Electronically signed by:  Davonte Calderon DO 10/7/2022 5:32 PM

## 2022-10-10 NOTE — TELEPHONE ENCOUNTER
Verified patient using two identifiers. Spoke with Mr. Barker who stated he was frustrated that he had not heard back regarding his BP log that was provided to the  multiple times. Advised that I would reach out to [facility] to address his concerns regarding the menu options and get back with him. States that he sent the monitor back to [facility] and is expecting anew one that works with his service provider to arrive tomorrow. Advised that per Otto Isaac NP he does not need to hold his Metoprolol. States that she would address his heart rate while he is in office tomorrow for his follow up appointment. Patient verbalized understanding, No further questions.

## 2022-10-11 ENCOUNTER — OFFICE VISIT (OUTPATIENT)
Dept: CARDIOLOGY CLINIC | Age: 77
End: 2022-10-11
Payer: MEDICARE

## 2022-10-11 VITALS
HEART RATE: 64 BPM | DIASTOLIC BLOOD PRESSURE: 78 MMHG | WEIGHT: 146 LBS | BODY MASS INDEX: 22.91 KG/M2 | SYSTOLIC BLOOD PRESSURE: 138 MMHG | OXYGEN SATURATION: 99 % | HEIGHT: 67 IN

## 2022-10-11 DIAGNOSIS — I10 ESSENTIAL HYPERTENSION: ICD-10-CM

## 2022-10-11 DIAGNOSIS — I25.10 CORONARY ARTERY DISEASE INVOLVING NATIVE CORONARY ARTERY OF NATIVE HEART WITHOUT ANGINA PECTORIS: ICD-10-CM

## 2022-10-11 DIAGNOSIS — E78.00 HYPERCHOLESTEREMIA: ICD-10-CM

## 2022-10-11 DIAGNOSIS — I48.0 PAROXYSMAL ATRIAL FIBRILLATION (HCC): Primary | ICD-10-CM

## 2022-10-11 DIAGNOSIS — Z95.1 HX OF CABG: ICD-10-CM

## 2022-10-11 PROCEDURE — G8432 DEP SCR NOT DOC, RNG: HCPCS | Performed by: NURSE PRACTITIONER

## 2022-10-11 PROCEDURE — 3074F SYST BP LT 130 MM HG: CPT | Performed by: NURSE PRACTITIONER

## 2022-10-11 PROCEDURE — G8536 NO DOC ELDER MAL SCRN: HCPCS | Performed by: NURSE PRACTITIONER

## 2022-10-11 PROCEDURE — 1101F PT FALLS ASSESS-DOCD LE1/YR: CPT | Performed by: NURSE PRACTITIONER

## 2022-10-11 PROCEDURE — 1123F ACP DISCUSS/DSCN MKR DOCD: CPT | Performed by: NURSE PRACTITIONER

## 2022-10-11 PROCEDURE — G8420 CALC BMI NORM PARAMETERS: HCPCS | Performed by: NURSE PRACTITIONER

## 2022-10-11 PROCEDURE — G8754 DIAS BP LESS 90: HCPCS | Performed by: NURSE PRACTITIONER

## 2022-10-11 PROCEDURE — 99214 OFFICE O/P EST MOD 30 MIN: CPT | Performed by: NURSE PRACTITIONER

## 2022-10-11 PROCEDURE — G0463 HOSPITAL OUTPT CLINIC VISIT: HCPCS | Performed by: NURSE PRACTITIONER

## 2022-10-11 PROCEDURE — G8752 SYS BP LESS 140: HCPCS | Performed by: NURSE PRACTITIONER

## 2022-10-11 PROCEDURE — G8427 DOCREV CUR MEDS BY ELIG CLIN: HCPCS | Performed by: NURSE PRACTITIONER

## 2022-10-11 PROCEDURE — 1111F DSCHRG MED/CURRENT MED MERGE: CPT | Performed by: NURSE PRACTITIONER

## 2022-10-11 PROCEDURE — 3078F DIAST BP <80 MM HG: CPT | Performed by: NURSE PRACTITIONER

## 2022-10-11 RX ORDER — LISINOPRIL 5 MG/1
5 TABLET ORAL DAILY
Qty: 90 TABLET | Refills: 1 | Status: SHIPPED | OUTPATIENT
Start: 2022-10-11

## 2022-10-11 RX ORDER — METOPROLOL SUCCINATE 25 MG/1
12.5 TABLET, EXTENDED RELEASE ORAL
Qty: 45 TABLET | Refills: 1 | Status: SHIPPED | OUTPATIENT
Start: 2022-10-11

## 2022-10-11 NOTE — PATIENT INSTRUCTIONS
Goal BP <130/80 most of the time also >110/55 most of the time    Goal HR >50 most of the time (high 40s ok if feeling OK), also <100 at rest     _________________________________________________________________    Change Toprol XL to 12.5mg per day  - take at bedtime  Restart lisinopril 5mg per day. Continue eliquis 5mg twice per day     Wear monitor as ordered. This is being re mailed to you. \    See your MD in 4-6 wks or as needed.

## 2022-10-11 NOTE — PROGRESS NOTES
Room: EP4    Visit Vitals  /78 (BP 1 Location: Right arm, BP Patient Position: Sitting, BP Cuff Size: Adult)   Pulse 64   Ht 5' 7\" (1.702 m)   Wt 146 lb (66.2 kg)   SpO2 99%   BMI 22.87 kg/m²         Chest pain: no  Shortness of breath: no  Dizziness: ALWAYS   Palpitations/Racing Heart: no  Swelling: no    New diagnosis/Surgeries since your last visit: no    Hospitalizations since your last visit: 9-27 to 9-29-22 Afib RVR    Refills: no

## 2022-10-11 NOTE — PROGRESS NOTES
NITHYA Acuna  [address] [facility], [address] [place] [address]    Office [phone]  Fax [phone]          NAME: Kushal Mejia   :  1945  MRM:  [id number]    Date:  10/11/2022            Assessment:     Problem List  Date Reviewed: 10/3/2022            Codes Class Noted    Paroxysmal atrial fibrillation ([facility] ICD-10-CM: I48.0  ICD-9-CM: 427.31  10/3/2022        Prediabetes ICD-10-CM: R73.03  ICD-9-CM: 790.29  2022        Peripheral vascular disease, unspecified ([facility] ICD-10-CM: I73.9  ICD-9-CM: 443.9  2022        Medicare annual wellness visit, subsequent ICD-10-CM: Z00.00  ICD-9-CM: V70.0  10/1/2021        Carpal tunnel syndrome on left ICD-10-CM: G56.02  ICD-9-CM: 354.0  Unknown    Overview Signed 3/17/2021  6:12 PM by Nirali Emerson MD     s/p repair             Hx of CABG ICD-10-CM: Z95.1  ICD-9-CM: V45.81  Unknown        Anxiety ICD-10-CM: F41.9  ICD-9-CM: 300.00  3/17/2021        Neuropathy ICD-10-CM: G62.9  ICD-9-CM: 355.9  3/17/2021        Constipation, unspecified constipation type ICD-10-CM: K59.00  ICD-9-CM: 564.00  3/17/2021        Tobacco use ICD-10-CM: Z72.0  ICD-9-CM: 305.1  3/17/2021        Essential hypertension ICD-10-CM: I10  ICD-9-CM: 401.9  Unknown        History of myocardial infarction ICD-10-CM: I25.2  ICD-9-CM: 901  Unknown        Hyperlipidemia ICD-10-CM: E78.5  ICD-9-CM: 272.4  Unknown        Tachycardia ICD-10-CM: R00.0  ICD-9-CM: 785.0  Unknown        BPH with obstruction/lower urinary tract symptoms ICD-10-CM: N40.1, N13.8  ICD-9-CM: 600.01, 599.69  Unknown    Overview Signed 2019 11:08 AM by Last Soria MD     s/p TURP 2008 with worsenintg s/s             Cognitive changes ICD-10-CM: R41.89  ICD-9-CM: 799.59  Unknown        Pulmonary nodules ICD-10-CM: R91.8  ICD-9-CM: 793.19  2019    Overview Signed 2020  4:05 PM by Last Soria MD     stable 2020 3 mm nodule RML, 3 mm nodule LLL Thrombocytopenia ([facility] ICD-10-CM: D69.6  ICD-9-CM: 287.5  5/20/2019        Adenomatous polyp of transverse colon ICD-10-CM: D12.3  ICD-9-CM: 211.3  5/20/2019        Insomnia ICD-10-CM: G47.00  ICD-9-CM: 780.52  5/20/2019        History of colon cancer ICD-10-CM: P38.895  ICD-9-CM: V10.05  5/18/2018    Overview Addendum 10/1/2021  1:36 PM by Daren Doll MD     5/2021 colonoscopy done; 2001, status post hemicolectomy, unknown stage, had 2 rounds of radiation but did not tolerate so stopped. No chemo. Removed 3 feet. No further issues             History of benign spinal cord tumor ICD-10-CM: Z86.018  ICD-9-CM: V12.49  2013    Overview Signed 3/17/2021  9:69 PM by MD Dr. Korey Lorenzo. s/p surgery 6/2018. MRI 5/2018 Densely enhancing intraspinal tumor as described from L4 to the first sacral             Atherosclerosis of native coronary artery of native heart without angina pectoris ICD-10-CM: I25.10  ICD-9-CM: 414.01  1/1/1993    Overview Addendum 3/17/2021  7:52 PM by Daren Doll MD     S/p multiple MI's, stents and eventually CABG                   Plan:     A. fib with RVR - now back in sinus  CAD-s/p CABG  Hypertension  Hyperlipidemia  History of colon cancer-precancerous per patient s/p colectomy  History of thrombocytopenia     Plan:  Cont plan for loop to monitor for recurrent afib - final results pending  On Toprol XL and eliquis therapy; hm HR low - sometimes 40s  Decrease Toprol XL to 12.5mg per day - at bedtime  BP elevated at home - restart lisinopril 5mg/d   Discussed VS goals: Goal BP <130/80 most of the time also >110/55 most of the time; Goal HR >50 most of the time (high 40s ok if feeling OK), also <100 at rest   Echocardiogram with LVEF 50-55%, nml wall mtion. Mild MR, mild MV leaflet prolapse. / TSH was nml   Hx of thrombocytopenia; will monitor CBC    Close fu in 4 to 6 wks to review loop . Subjective:     Tristan Ruiz, a 67 y.o. year-old who presents for followup. He has known history of CAD, PCI, CABG, hypertension dyslipidemia. His CABG was performed in 1999 and [address] [place] [address].  He has stents prior to that. He is returning today for dc follow-up; inpt 9/27/22 to 9/29/22 for afib with RVR. Hospital records reviewed. Per dc summary:   Atrial fibrillation with rapid ventricular response (HCC) (9/27/2022)/history of paroxysmal A. fib POA: Unclear trigger -possibly GI prep for colonoscopy/ IVVD. TSH wnl.  echo low normal EF but diastolic dysfunction. S/p Cardizem drip spontaneously converted to NSR. Continue beta-blocker - changed to BID. Continue eliquis and monitor platelets. Cardiac monitor on DC.    RRR today in office. Hm BP 140s-170s/80s. HR often 40s-50s     Patient denies any exertional chest pain, dyspnea, palpitations, syncope, edema, or paroxysmal nocturnal dyspnea. Taking meds as rx. Exam:     Physical Exam:  Visit Vitals  /78 (BP 1 Location: Right arm, BP Patient Position: Sitting, BP Cuff Size: Adult)   Pulse 64   Ht 5' 7\" (1.702 m)   Wt 146 lb (66.2 kg)   SpO2 99%   BMI 22.87 kg/m²     General - well developed well nourished  Neck - neck supple, no JVD  Cardiac - normal S1, S2, RRR, no murmurs, rubs or gallops. No clicks  Vascular - carotids without bruits, radials pulses equal bilateral  Lungs - clear to auscultation bilaterals, no rales, wheezing or rhonchi  Abd - soft nontender, non-distended, +BS  Extremities - no edema, warm  Neuro - nonfocal  Psych - normal mood and affect      Medications:     Current Outpatient Medications   Medication Sig    apixaban (ELIQUIS) 5 mg tablet Take 1 Tablet by mouth two (2) times a day for 90 days. cholecalciferol (VITAMIN D3) (1000 Units /25 mcg) tablet Take 1,000 Units by mouth daily. folic acid/multivit-min/lutein (CENTRUM SILVER PO) Take 1 Tablet by mouth daily. ALPRAZolam (XANAX) 0.5 mg tablet TAKE 1 TABLET NIGHTLY AS   NEEDED FOR ANXIETY. MAXIMUMDAILY AMOUNT: 0.5 MG    niacin (NIASPAN) 1,000 mg Tb24 tab TAKE 2 TABLETS NIGHTLY    Crestor 10 mg tablet TAKE 1 TABLET NIGHTLY    omega-3 fatty acids-vitamin e 1,000 mg cap Take 1 Cap by mouth two (2) times a day. ascorbic acid, vitamin C, (VITAMIN C) 500 mg tablet Take 500 mg by mouth daily. metoprolol tartrate (LOPRESSOR) 25 mg tablet Take 0.5 Tablets by mouth every twelve (12) hours for 60 days. (Patient taking differently: Take 12.5 mg by mouth every twelve (12) hours. Only takes when HR is 50 or above)    polyethylene glycol (MIRALAX) 17 gram/dose powder Take 17 g by mouth daily. 1/2 as needed (Patient not taking: Reported on 10/11/2022)     No current facility-administered medications for this visit. Diagnostic Data Review:       09/27/22    ECHO ADULT COMPLETE 09/28/2022 9/28/2022    Interpretation Summary    Left Ventricle: Low normal left ventricular systolic function with a visually estimated EF of 50 - 55%. Left ventricle is mildly dilated. Normal wall motion. Diastolic dysfunction present with normal LV EF. Right Ventricle: Right ventricle is mildly dilated. Normal wall thickness. Mildly reduced systolic function. Aortic Valve: Tricuspid valve. Mild sclerosis of the aortic valve cusp. Mitral Valve: Mild leaflet prolapse noted. Mild regurgitation. Right Atrium: Right atrium is moderately dilated. Aorta: Not well visualized. Dilated aortic root.     Signed by: Tete Giles DO on 9/28/2022 12:53 PM      Lab Review:     Lab Results   Component Value Date/Time    Sodium 142 09/29/2022 12:14 AM    Potassium 4.1 09/29/2022 12:14 AM    Chloride 112 (H) 09/29/2022 12:14 AM    CO2 28 09/29/2022 12:14 AM    Anion gap 2 (L) 09/29/2022 12:14 AM    Glucose 95 09/29/2022 12:14 AM    BUN 16 09/29/2022 12:14 AM    Creatinine 1.02 09/29/2022 12:14 AM    BUN/Creatinine ratio 16 09/29/2022 12:14 AM    GFR est AA >60 09/29/2022 12:14 AM    GFR est non-AA >60 09/29/2022 12:14 AM    Calcium 8.6 09/29/2022 12:14 AM    Bilirubin, total 0.8 09/27/2022 08:41 AM    Alk.  phosphatase 73 09/27/2022 08:41 AM    Protein, total 6.6 09/27/2022 08:41 AM    Albumin 3.5 09/27/2022 08:41 AM    Globulin 3.1 09/27/2022 08:41 AM    A-G Ratio 1.1 09/27/2022 08:41 AM    ALT (SGPT) 28 09/27/2022 08:41 AM    AST (SGOT) 24 09/27/2022 08:41 AM     Lab Results   Component Value Date/Time    WBC 6.0 10/06/2022 12:00 AM    HGB 13.7 10/06/2022 12:00 AM    HCT 41.4 10/06/2022 12:00 AM    PLATELET 868 (L) 76/85/5500 12:00 AM     (H) 10/06/2022 12:00 AM     Lab Results   Component Value Date/Time    Cholesterol, total 124 08/02/2022 10:32 AM    HDL Cholesterol 61 08/02/2022 10:32 AM    LDL, calculated 52.6 08/02/2022 10:32 AM    VLDL, calculated 10.4 08/02/2022 10:32 AM    Triglyceride 52 08/02/2022 10:32 AM    CHOL/HDL Ratio 2.0 08/02/2022 10:32 AM     Lab Results   Component Value Date/Time    TSH 2.18 09/27/2022 05:40 PM    T4, Free 1.1 03/17/2021 09:29 AM     Lab Results   Component Value Date/Time    Hemoglobin A1c 5.8 (H) 08/02/2022 10:32 AM     No results found for: CPK, RCK1, RCK2, RCK3, RCK4, CKMB, CKNDX, CKND1, TROPT, TROIQ, BNPP, BNP    [facility], ANP

## 2022-10-13 NOTE — TELEPHONE ENCOUNTER
Verified patient using two identifiers. Spoke with Mr. Barker regarding my phone call with [place]. Advised that they were able to collect data from his first monitor that was sent to him from 10/5-10/8. They could use this information in addition to the monitor he is currently wearing. Patient inquired about \"reporting an event\". Advised that he should only do this if he is symptomatic. States that he does not need to record each time he exercises etc. Patient verbalized understanding, no further questions.

## 2022-10-17 ENCOUNTER — HOSPITAL ENCOUNTER (OUTPATIENT)
Dept: CT IMAGING | Age: 77
Discharge: HOME | End: 2022-10-17
Attending: INTERNAL MEDICINE
Payer: MEDICARE

## 2022-10-17 VITALS — BODY MASS INDEX: 22.73 KG/M2 | HEIGHT: 68 IN | WEIGHT: 150 LBS

## 2022-10-17 DIAGNOSIS — Z87.891 PERSONAL HISTORY OF TOBACCO USE, PRESENTING HAZARDS TO HEALTH: ICD-10-CM

## 2022-10-17 PROCEDURE — 71271 CT THORAX LUNG CANCER SCR C-: CPT

## 2022-10-18 ENCOUNTER — DOCUMENTATION ONLY (OUTPATIENT)
Dept: CARDIOLOGY CLINIC | Age: 77
End: 2022-10-18

## 2022-10-18 NOTE — PROGRESS NOTES
Received a call from Krystal Sales at [facility], with a report of an urgent report for A-Fib and Musa episodes.     Message was sent to Dr Bairon Stewart

## 2022-11-01 ENCOUNTER — DOCUMENTATION ONLY (OUTPATIENT)
Dept: CARDIOLOGY CLINIC | Age: 77
End: 2022-11-01

## 2022-11-01 NOTE — PROGRESS NOTES
[employer] 14 day holter results:    Preliminary Findings     23 events were transmitted. 1 patient triggered; 22 auto triggered   Patient monitored for 15d 15h 21m   AF occurred 13 time(s) with HR range of 49 - 77;  Total AF [place] 2%   SVT occurred 1 time(s) with fastest run 127 BPM   22,444 PACs with PAC burden of 2%   11,730 PVCs with PVC burden of 1%    results sent to scanning

## 2022-11-04 NOTE — PROGRESS NOTES
Pls notify>>>    Continued PAF 2%  Continue current meds. Needs to be seen in office. [employer] 14 day holter results:        23 events were transmitted. 1 patient triggered; 22 auto triggered   Patient monitored for 15d 15h 21m   AF occurred 13 time(s) with HR range of 49 - 77;  Total AF [place] 2%   SVT occurred 1 time(s) with fastest run 127 BPM   22,444 PACs with PAC burden of 2%   11,730 PVCs with PVC burden of 1%

## 2022-11-07 ENCOUNTER — DOCUMENTATION ONLY (OUTPATIENT)
Dept: CARDIOLOGY CLINIC | Age: 77
End: 2022-11-07

## 2022-11-07 NOTE — PROGRESS NOTES
[employer] 14 day holter results:    Preliminary Findings     23 events were transmitted. 1 patient triggered; 22 auto triggered   Patient monitored for 15d 15h 21m   AF occurred 13 time(s) with HR range of 49 - 77;  Total AF [place] 2%   SVT occurred 1 time(s) with fastest run 127 BPM   22,444 PACs with PAC burden of 2%   11,730 PVCs with PVC burden of 1%    results sent to scanning

## 2022-11-08 DIAGNOSIS — D69.6 THROMBOCYTOPENIA (HCC): Primary | ICD-10-CM

## 2022-11-08 NOTE — PROGRESS NOTES
[id number] [facility]  [phone]    Hematology / Oncology Established Visit    Reason for Visit:   Dl Arellano is a 67 y.o. male who is seen in follow up of thrombocytopenia. Referred by Dr. Marilyn Reyes. History of Present Illness:   Dl Arellano is a 67 y.o. male who is seen for thrombocytopenia. Review of labs in our system reveals PLT count in range of  since at least 2013. The PLT count of 89 was reportedly at time of a febrile illness. No EtOH abuse or liver disease. No fevers, chills, sweats. No unintentional weight loss, but has \"lost 30-lbs\" intentionally; however review of VS in our system reveals 12-lb weight loss in past 5 months. He goes the gym frequently. No recurrent fevers, chills. No family h/o blood disorders. Interval History:  Pt returns for follow up of thrombocytopenia. In early Sept 2022, he was admitted for Afib and started on Eliquis. No melena/hematochezia, hematuria. PMHx: BPH, Carpal tunnel syndrome on Left, h/o MI, XOL, stable pulm nodules  PSurgHx: Cataract removal, CABG, bilat inguinal hernia repair, lumbar diskectomy, tonsillectomy, partial colectomy, TURP   SHx: Former smoker, quit 5/2012 after 50 pk yrs, EtOH 3 drinks/week  FHx: Father with heart disease, Mother with DM, Maternal GM with pancreas cancer. Review of Systems: A complete review of systems was obtained, negative except as described above. Physical Exam:   Blood pressure 134/70, pulse (!) 55, temperature 97.7 °F (36.5 °C), resp. rate 16, height 5' 8\" (1.727 m), weight 152 lb (68.9 kg), SpO2 97 %.     ECOG PS: 0  General: no distress  Eyes: anicteric sclerae  HENT: oropharynx clear  Neck: supple  Lymphatic: no cervical, supraclavicular adenopathy  Respiratory: normal respiratory effort  CV: no peripheral edema  GI: soft, nontender, nondistended, no masses  Skin: no rashes; no ecchymoses; no petechiae      Results:     Lab Results   Component Value Date/Time    WBC 6.0 10/06/2022 12:00 AM    HGB 13.7 10/06/2022 12:00 AM    HCT 41.4 10/06/2022 12:00 AM    PLATELET 091 (L)  12:00 AM     (H) 10/06/2022 12:00 AM    ABS. NEUTROPHILS 3.6 10/06/2022 12:00 AM     Lab Results   Component Value Date/Time    Sodium 142 2022 12:14 AM    Potassium 4.1 2022 12:14 AM    Chloride 112 (H) 2022 12:14 AM    CO2 28 2022 12:14 AM    Glucose 95 2022 12:14 AM    BUN 16 2022 12:14 AM    Creatinine 1.02 2022 12:14 AM    GFR est AA >60 2022 12:14 AM    GFR est non-AA >60 2022 12:14 AM    Calcium 8.6 2022 12:14 AM     Lab Results   Component Value Date/Time    Bilirubin, total 0.8 2022 08:41 AM    ALT (SGPT) 28 2022 08:41 AM    Alk. phosphatase 73 2022 08:41 AM    Protein, total 6.6 2022 08:41 AM    Albumin 3.5 2022 08:41 AM    Globulin 3.1 2022 08:41 AM     No results found for: IRON, FE, TIBC, IBCT, PSAT, FERR    Lab Results   Component Value Date/Time    Vitamin B12 1,604 (H) 2021 09:29 AM    Folate 63.4 (H) 2021 09:29 AM     Lab Results   Component Value Date/Time    TSH 2.18 2022 05:40 PM     Lab Results   Component Value Date/Time    Hepatitis B surface Ag <0.10 2021 12:33 PM    Hep B Core Ab, total Negative 2021 12:33 PM         Imagin/3/21 Abdomen ultrasound:  IMPRESSION  No ultrasonographic evidence of hepatosplenomegaly or other acute  abnormality. Renal cysts and borderline 4.2 mm pancreatic duct dilation  incidentally noted. Assessment & Plan:   Keyla Kraft is a 67 y.o. male with CAD, HTN, Dorisann Eagle who is seen for thrombocytopenia. 1. Chronic thrombocytopenia:   Chronic since at least . No B12, folate deficiency. Hep B panel, HCV, HIV negative. UPEP negative. No medication culprits. No B symptoms. No EtOH abuse or known liver disease.  Given concurrent macrocytosis, it is possible that patient has MDS (Myelodysplastic syndrome). I discussed with patient that MDS can cause a sluggish bone marrow with low counts. However, it does not require treatment unless PLT count remains persistently below 80-90 range. Therefore, I recommend monitoring his PLT count periodically 2-3 times a year. I recommend delaying bone marrow biopsy until the future if needed. No contraindication to anticoagulation unless PLT count declines to < 50. PLT count currently in 120-140 range  -- Repeat CBC in 6 months and 12 months  -- Return in 1 year - can see me sooner if PLT drops < 90 or Hgb < 10.     2. Atrial fibrillation with RVR / CAD s/p PCI And CABG:  Cardiology evaluated and started pt on Eliquis and resumed Metoprolol. Lisinopril discontinued. No Hematologic contraindication to using anticoagulation such as Eliquis as patient's PLT count remains consistently above 50. Pt had 1 episode of PLT count 89 in 2015, but was higher than 100 otherwise. 3. H/o colon cancer:   Surgical resection in 2001. No melena/hematochezia. Will need to reschedule surveillance colonoscopy in near future. 4. Sinus bradycardia: 2/2 Metoprolol XL. 5. Macrocytosis without anemia:  No B12 or folate deficiency. Could be due to MDS. Will monitor. I appreciate the opportunity to participate in Mr. Star Barker's care.     Signed By: Brigido Fuentes MD

## 2022-11-10 ENCOUNTER — OFFICE VISIT (OUTPATIENT)
Dept: ONCOLOGY | Age: 77
End: 2022-11-10
Payer: MEDICARE

## 2022-11-10 VITALS
OXYGEN SATURATION: 97 % | DIASTOLIC BLOOD PRESSURE: 70 MMHG | TEMPERATURE: 97.7 F | SYSTOLIC BLOOD PRESSURE: 134 MMHG | HEIGHT: 68 IN | RESPIRATION RATE: 16 BRPM | HEART RATE: 55 BPM | WEIGHT: 152 LBS | BODY MASS INDEX: 23.04 KG/M2

## 2022-11-10 DIAGNOSIS — I48.0 PAROXYSMAL ATRIAL FIBRILLATION (HCC): ICD-10-CM

## 2022-11-10 DIAGNOSIS — D69.6 THROMBOCYTOPENIA (HCC): Primary | ICD-10-CM

## 2022-11-10 DIAGNOSIS — D75.89 MACROCYTOSIS WITHOUT ANEMIA: ICD-10-CM

## 2022-11-10 DIAGNOSIS — Z85.038 HISTORY OF COLON CANCER: ICD-10-CM

## 2022-11-10 PROCEDURE — G8536 NO DOC ELDER MAL SCRN: HCPCS | Performed by: INTERNAL MEDICINE

## 2022-11-10 PROCEDURE — 3078F DIAST BP <80 MM HG: CPT | Performed by: INTERNAL MEDICINE

## 2022-11-10 PROCEDURE — 1123F ACP DISCUSS/DSCN MKR DOCD: CPT | Performed by: INTERNAL MEDICINE

## 2022-11-10 PROCEDURE — 1101F PT FALLS ASSESS-DOCD LE1/YR: CPT | Performed by: INTERNAL MEDICINE

## 2022-11-10 PROCEDURE — G8754 DIAS BP LESS 90: HCPCS | Performed by: INTERNAL MEDICINE

## 2022-11-10 PROCEDURE — G8420 CALC BMI NORM PARAMETERS: HCPCS | Performed by: INTERNAL MEDICINE

## 2022-11-10 PROCEDURE — G0463 HOSPITAL OUTPT CLINIC VISIT: HCPCS | Performed by: INTERNAL MEDICINE

## 2022-11-10 PROCEDURE — G8427 DOCREV CUR MEDS BY ELIG CLIN: HCPCS | Performed by: INTERNAL MEDICINE

## 2022-11-10 PROCEDURE — 3074F SYST BP LT 130 MM HG: CPT | Performed by: INTERNAL MEDICINE

## 2022-11-10 PROCEDURE — G8510 SCR DEP NEG, NO PLAN REQD: HCPCS | Performed by: INTERNAL MEDICINE

## 2022-11-10 PROCEDURE — G8752 SYS BP LESS 140: HCPCS | Performed by: INTERNAL MEDICINE

## 2022-11-10 PROCEDURE — 99214 OFFICE O/P EST MOD 30 MIN: CPT | Performed by: INTERNAL MEDICINE

## 2022-11-10 NOTE — LETTER
11/10/2022    Patient: Fernando Lomeli   YOB: 1945   Date of Visit: 11/10/2022     Elvis Vaughn, [address] [place]  [address]  [address] [place] [address]  [facility] [address]    Dear Elvis Vaughn MD,      Thank you for referring Mr. Fernando Lomeli to [facility] for evaluation. My notes for this consultation are attached. If you have questions, please do not hesitate to call me. I look forward to following your patient along with you.       Sincerely,    Reece Cardenas MD

## 2022-11-10 NOTE — PROGRESS NOTES
Rm    Chief Complaint   Patient presents with    Annual Exam        Visit Vitals  /70 (BP 1 Location: Right upper arm, BP Patient Position: Sitting, BP Cuff Size: Adult)   Pulse (!) 55   Temp 97.7 °F (36.5 °C)   Resp 16   Ht 5' 8\" (1.727 m)   Wt 152 lb (68.9 kg)   SpO2 97%   BMI 23.11 kg/m²        1. Have you been to the ER, urgent care clinic since your last visit? Hospitalized since your last visit? No    2. Have you seen or consulted any other health care providers outside of the [address] [place] [address] since your last visit? Include any pap smears or colon screening. No     There are no preventive care reminders to display for this patient. 3 most recent PHQ Screens 11/10/2022   Little interest or pleasure in doing things Not at all   Feeling down, depressed, irritable, or hopeless Not at all   Total Score PHQ 2 0        Fall Risk Assessment, last 12 mths 11/10/2022   Able to walk? Yes   Fall in past 12 months? 0   Do you feel unsteady? 0   Are you worried about falling 0   Is TUG test greater than 12 seconds? -   Is the gait abnormal? -   Number of falls in past 12 months -   Fall with injury?  -       Learning Assessment 4/11/2019   PRIMARY LEARNER Patient   HIGHEST LEVEL OF EDUCATION - PRIMARY LEARNER  -   BARRIERS PRIMARY LEARNER -   CO-LEARNER CAREGIVER -   PRIMARY LANGUAGE ENGLISH   LEARNER PREFERENCE PRIMARY DEMONSTRATION   ANSWERED BY patient   RELATIONSHIP SELF

## 2022-11-16 ENCOUNTER — TELEPHONE (OUTPATIENT)
Dept: INTERNAL MEDICINE CLINIC | Age: 77
End: 2022-11-16

## 2022-11-16 NOTE — TELEPHONE ENCOUNTER
Nurse returned call and spoke with Ary at [facility]. Patient had colonoscopy with Dr Silvana Chang who recommended in notes repeat of colonoscopy needed in 5 years. Ary will send notes. Nurse advised ary that per chart referral was not sent by our office. Last referral placed was 4/2021 and colonoscopy has been completed with Dr Silvana Chang. Nurse advised that per chart two different providers placed a referral for screening of colon in September.  Ary will call patient to have patient reach out to his cardiologist who placed referral.

## 2022-11-16 NOTE — TELEPHONE ENCOUNTER
[facility] is calling to say the patient had a colonoscopy last year. He is not due for another one for five years. Patient has stated he is not having any issues. They are calling because they received a referral request from Dr. Justin Sabillon office for a colonoscopy. They are faxing over this information and would like a call back to let them know if the patient needs this colonoscopy.

## 2022-11-30 ENCOUNTER — OFFICE VISIT (OUTPATIENT)
Dept: CARDIOLOGY CLINIC | Age: 77
End: 2022-11-30
Payer: MEDICARE

## 2022-11-30 VITALS
SYSTOLIC BLOOD PRESSURE: 119 MMHG | DIASTOLIC BLOOD PRESSURE: 65 MMHG | HEIGHT: 68 IN | BODY MASS INDEX: 23.11 KG/M2 | OXYGEN SATURATION: 98 % | HEART RATE: 57 BPM

## 2022-11-30 DIAGNOSIS — Z95.1 HX OF CABG: Primary | ICD-10-CM

## 2022-11-30 DIAGNOSIS — R00.1 BRADYCARDIA: ICD-10-CM

## 2022-11-30 DIAGNOSIS — I48.0 PAROXYSMAL ATRIAL FIBRILLATION (HCC): ICD-10-CM

## 2022-11-30 DIAGNOSIS — I25.10 CORONARY ARTERY DISEASE INVOLVING NATIVE CORONARY ARTERY OF NATIVE HEART WITHOUT ANGINA PECTORIS: ICD-10-CM

## 2022-11-30 PROCEDURE — G0463 HOSPITAL OUTPT CLINIC VISIT: HCPCS | Performed by: INTERNAL MEDICINE

## 2022-11-30 RX ORDER — RAMELTEON 8 MG/1
8 TABLET ORAL
COMMUNITY

## 2022-11-30 NOTE — PROGRESS NOTES
Chief Complaint   Patient presents with    Hypertension    Holter Monitor     Followup    Cholesterol Problem    Coronary Artery Disease    Irregular Heart Beat     PAF     Vitals:    11/30/22 1354   BP: 119/65   BP 1 Location: Right arm   BP Patient Position: Sitting   Pulse: (!) 57   Height: 5' 8\" (1.727 m)   SpO2: 98%     Chest pain Denied     Palpations Denied    SOB Denied    Dizziness Denied    Swelling in hands/feet MORE ON THE LEFT BUT BOTH AT THE END OF THE DAY.      Recent hospital stays 9/27/22= A FIB WITH RVR FOR 2 DAYS

## 2022-12-22 ENCOUNTER — TELEPHONE (OUTPATIENT)
Dept: CARDIOLOGY CLINIC | Age: 77
End: 2022-12-22

## 2022-12-22 NOTE — TELEPHONE ENCOUNTER
Spoke with The pt, identified the pt with name and . Informed pt  he is to continue his Eliquis and that I could send in refills for him. I will send a 1 month supply to his local [facility] and a years worth of refills to his mail order [facility]. The pt expressed understanding and agreement. Pt has no further questions or concerns at this time. Refill per VO of Dr. Abelardo Benjamin:    Last appt: 2022    Future Appointments   Date Time Provider Prakash Mckinley   7521  5:53 AM Alycia Hendricks MD [facility] BS AMB   2023  9:00 AM [facility]   2023  8:30 AM [facility]       Requested Prescriptions     Signed Prescriptions Disp Refills    apixaban (ELIQUIS) 5 mg tablet 60 Tablet 0     Sig: Take 1 Tablet by mouth two (2) times a day. Authorizing Provider: Jocelyn Sarah     Ordering User: Yamilka Montenegro   Refill per VO of Dr. Abelardo Benjamin:    Last appt: 2022    Future Appointments   Date Time Provider Prakash Mckinley   9151  4:19 AM Alycia Hendricks MD [facility] BS AMB   2023  9:00 AM [facility]   2023  8:30 AM Chacho Moss MD [facility] BS AMB       Requested Prescriptions     Signed Prescriptions Disp Refills    apixaban (ELIQUIS) 5 mg tablet 180 Tablet 3     Sig: Take 1 Tablet by mouth two (2) times a day.      Authorizing Provider: Jocelyn Sarah     Ordering User: Yamilka Montenegro

## 2022-12-22 NOTE — TELEPHONE ENCOUNTER
Patient is calling because he is trying to find out if Rocio Hawkins would like him to continue taking Eliquis 5 mg or stop. Patient only has a weeks worth of medicine left. Pharmacy is confirmed.     [phone]

## 2023-01-23 ENCOUNTER — HOSPITAL ENCOUNTER (EMERGENCY)
Age: 78
Discharge: HOME | End: 2023-01-23
Attending: EMERGENCY MEDICINE
Payer: MEDICARE

## 2023-01-23 ENCOUNTER — APPOINTMENT (OUTPATIENT)
Dept: GENERAL RADIOLOGY | Age: 78
End: 2023-01-23
Attending: EMERGENCY MEDICINE
Payer: MEDICARE

## 2023-01-23 VITALS
RESPIRATION RATE: 16 BRPM | DIASTOLIC BLOOD PRESSURE: 71 MMHG | HEIGHT: 68 IN | SYSTOLIC BLOOD PRESSURE: 131 MMHG | BODY MASS INDEX: 23.04 KG/M2 | HEART RATE: 66 BPM | OXYGEN SATURATION: 97 % | TEMPERATURE: 97.2 F | WEIGHT: 152 LBS

## 2023-01-23 DIAGNOSIS — L03.119 CELLULITIS OF FOOT: Primary | ICD-10-CM

## 2023-01-23 PROCEDURE — 99283 EMERGENCY DEPT VISIT LOW MDM: CPT

## 2023-01-23 PROCEDURE — 73630 X-RAY EXAM OF FOOT: CPT

## 2023-01-23 RX ORDER — CEPHALEXIN 500 MG/1
500 CAPSULE ORAL 4 TIMES DAILY
Qty: 28 CAPSULE | Refills: 0 | Status: SHIPPED | OUTPATIENT
Start: 2023-01-23 | End: 2023-01-30

## 2023-01-23 NOTE — ED TRIAGE NOTES
Patient reports he got out of bed in the middle of the night and had an expected pain in his right foot which caused him to fall back onto bed. Patient denies any trauma or injury prior-patient ambulated to triage with a steady gait.

## 2023-01-23 NOTE — ED PROVIDER NOTES
Mr. Evelyn Magana is a 69yo male who presents to the ER with complaints of foot pain. He said that he is having pain in his right foot. His pain started last night in the melanite. He said he was walking yesterday and he was feeling a \"popping \"sound in his foot. It was not hurting him. He went to stand up this morning and had severe pain in the right foot. He fell back onto the bed. He still had pain in the foot today but he says that the pain is better than it was last night. He has noticed redness on the inside of his foot and some swelling of the inside of the foot. He had not had this prior to yesterday. He denies any other recent trauma or falls. He denies any other injuries to his foot. He denies any fevers or chills. He denies any complaints. Past Medical History:   Diagnosis Date    Adenocarcinoma of colon ([facility] [address]     2001    BPH with obstruction/lower urinary tract symptoms     s/p TURP 2008 with worsenintg s/s    CAD (coronary artery disease)     MI in 1993    Carpal tunnel syndrome on left     s/p repair, caused L arm pains chronically    Constipation     Chronic    History of myocardial infarction     Hypercholesteremia     Hypertension     Pulmonary nodules 05/2019    stable 12/2020 3 mm nodule RML, 3 mm nodule LLL    Tachycardia        Past Surgical History:   Procedure Laterality Date    COLONOSCOPY N/A 5/9/2019    COLONOSCOPY performed by Ally Morgan MD at [facility]    caused chronic pain L arm from nerve block    HX CATARACT REMOVAL Bilateral     HX CORONARY ARTERY BYPASS GRAFT  1999    cabg x 3    HX HEART CATHETERIZATION      multiple stents    HX HERNIA REPAIR      bilateral inguinal    HX LUMBAR DISKECTOMY  06/2018    Dr Sharon Ovalles.  s/p spinal tumor resection    HX TONSILLECTOMY      HX TOTAL COLECTOMY  2001    partial    HX TURP  2008         Family History:   Problem Relation Age of Onset    Heart Disease Father     Diabetes Mother Heart Disease Paternal Grandfather     Cancer Maternal Grandmother         pancreatic       Social History     Socioeconomic History    Marital status:      Spouse name: Flex Esposito    Number of children: 2    Years of education: Not on file    Highest education level: Not on file   Occupational History     Employer: RETIRED    Occupation: Retired [employer]     Employer: RETIRED   Tobacco Use    Smoking status: Former     Packs/day: 1.00     Years: 50.00     Pack years: 50.00     Types: Cigarettes     Quit date: 5/18/2012     Years since quitting: 10.6    Smokeless tobacco: Never   Vaping Use    Vaping Use: Never used   Substance and Sexual Activity    Alcohol use: Not Currently     Comment: usually 3 drinks week, but not as much with covid, occasionally    Drug use: Yes     Types: Marijuana     Comment: weekly (\"vaporizes the flower\")    Sexual activity: Yes     Partners: Female   Other Topics Concern    Not on file   Social History Narrative    Not on file     Social Determinants of Health     Financial Resource Strain: Not on file   Food Insecurity: Not on file   Transportation Needs: Not on file   Physical Activity: Not on file   Stress: Not on file   Social Connections: Not on file   Intimate Partner Violence: Not on file   Housing Stability: Not on file         ALLERGIES: Rosuvastatin    Review of Systems   Constitutional:  Negative for chills and fever. HENT:  Negative for rhinorrhea and sore throat. Respiratory:  Negative for cough and shortness of breath. Cardiovascular:  Negative for chest pain. Gastrointestinal:  Negative for abdominal pain, diarrhea, nausea and vomiting. Genitourinary:  Negative for dysuria and hematuria. Musculoskeletal:  Negative for arthralgias and myalgias. Foot pain   Skin:  Negative for pallor and rash. Neurological:  Negative for dizziness, weakness and light-headedness. All other systems reviewed and are negative.     Vitals:    01/23/23 1144 01/23/23 1145   BP: 131/71    Pulse: 66    Resp: 16    Temp: 97.2 °F (36.2 °C)    SpO2: 97%    Weight: 65.8 kg (145 lb) 68.9 kg (152 lb)   Height: 5' 8\" (1.727 m)             Physical Exam     Vital signs reviewed. Nursing notes reviewed. Const:  No acute distress, well developed, well nourished  Head:  Atraumatic, normocephalic  Eyes:  PERRL, conjunctiva normal, no scleral icterus  Neck:  Supple, trachea midline  Cardiovascular: Normal rate  Resp:  No resp distress, no increased work of breathing  Abd:  Soft, non-tender, non-distended  MSK: Erythema in area this approximately 4 cm x 4 cm on the medial right midfoot, significant tenderness to palpation at the skin, no palpation to the rest of the foot, 2+ right DP pulse, no pain with range of motion at the foot or ankle, can fully range of motion his foot midfoot with significant tension without any reproducible pain. However, light touch the skin again causes considerable pain. Neuro:  Alert and oriented x3, no cranial nerve defect  Skin: See MSK, no induration, no fluctuance, no appreciable swelling  Psych: normal mood and affect, behavior is normal, judgement and thought content is normal          Medical Decision Making  Amount and/or Complexity of Data Reviewed  External Data Reviewed: notes. Radiology: ordered. Risk  Prescription drug management. Mr. Kike Rondon is a 69yo male who presents to the ER with complaints of foot pain. He is well-appearing in the ER. He is able to walk unassisted. No fractures on x-ray. He has tenderness to palpation with light palpation at his skin. He also has erythema at his foot. He has no pain with deeper manipulation of his foot and range of motion at his foot. Differential includes fracture, musculoskeletal pain, cellulitis, and other inflammatory process including gout. However, his symptoms seem to be very much localized to the skin and did not seem to involve deeper structures.   Exam is typical and not consistent with gout. I think he likely has cellulitis. However, he agrees to follow-up with his doctor this week to be evaluated. I will start him on Keflex. He is to her return to the ER with any new or worsening symptoms.         Procedures

## 2023-03-29 ENCOUNTER — PATIENT MESSAGE (OUTPATIENT)
Dept: CARDIOLOGY CLINIC | Age: 78
End: 2023-03-29

## 2023-03-30 RX ORDER — LISINOPRIL 10 MG/1
10 TABLET ORAL DAILY
Qty: 90 TABLET | Refills: 3 | Status: SHIPPED | OUTPATIENT
Start: 2023-03-30

## 2023-03-30 NOTE — TELEPHONE ENCOUNTER
Refill per VO of Dr. Jocelyn Sim:    Last appt: 11/30/2022    Future Appointments   Date Time Provider Prakash Mckinley   2/1/4143  5:83 AM Edmundo Matamoros MD [facility] BS AMB   6/1/2023  9:00 AM Lary Villa MD [facility] BS AMB   11/9/2023  8:30 AM Thorn, Annye Brunner, MD [facility] BS AMB       Requested Prescriptions     Pending Prescriptions Disp Refills    lisinopriL (PRINIVIL, ZESTRIL) 10 mg tablet 90 Tablet 3     Sig: Take 1 Tablet by mouth daily.

## 2023-03-31 ENCOUNTER — TELEPHONE (OUTPATIENT)
Dept: ONCOLOGY | Age: 78
End: 2023-03-31

## 2023-03-31 NOTE — TELEPHONE ENCOUNTER
[address] at [place]  [phone]        03/31/23 12:12 PM Return call placed to patient. Verified patient ID x 2. Per chart review, advised that Dr. Cynthia Palomo requested that patient have repeat labs approximately 6 months from 11/2022 office visit. Patient voiced understanding and stated he will be gone for the month of May. Advised that he can have labs drawn end of April--patient agreeable to this plan. Patient to also have repeat labs prior to 11/09/23 appointment. Patient voiced understanding. No further questions or concerns at this time.

## 2023-03-31 NOTE — TELEPHONE ENCOUNTER
Pt called in and stated he had a [employer] message for a CBC form to completet.  He didn't know what this was for    CB #[phone]

## 2023-04-03 ENCOUNTER — OFFICE VISIT (OUTPATIENT)
Dept: INTERNAL MEDICINE CLINIC | Age: 78
End: 2023-04-03
Payer: MEDICARE

## 2023-04-03 DIAGNOSIS — M25.552 BILATERAL HIP PAIN: ICD-10-CM

## 2023-04-03 DIAGNOSIS — F41.9 ANXIETY: ICD-10-CM

## 2023-04-03 DIAGNOSIS — M25.551 BILATERAL HIP PAIN: ICD-10-CM

## 2023-04-03 DIAGNOSIS — I48.0 PAROXYSMAL ATRIAL FIBRILLATION (HCC): ICD-10-CM

## 2023-04-03 DIAGNOSIS — R25.2 LEG CRAMP: ICD-10-CM

## 2023-04-03 DIAGNOSIS — I10 ESSENTIAL HYPERTENSION: Primary | ICD-10-CM

## 2023-04-03 DIAGNOSIS — D69.6 THROMBOCYTOPENIA (HCC): ICD-10-CM

## 2023-04-03 DIAGNOSIS — E78.49 OTHER HYPERLIPIDEMIA: ICD-10-CM

## 2023-04-03 DIAGNOSIS — I25.10 ATHEROSCLEROSIS OF NATIVE CORONARY ARTERY OF NATIVE HEART WITHOUT ANGINA PECTORIS: ICD-10-CM

## 2023-04-03 PROCEDURE — 1101F PT FALLS ASSESS-DOCD LE1/YR: CPT | Performed by: INTERNAL MEDICINE

## 2023-04-03 PROCEDURE — G8510 SCR DEP NEG, NO PLAN REQD: HCPCS | Performed by: INTERNAL MEDICINE

## 2023-04-03 PROCEDURE — 99214 OFFICE O/P EST MOD 30 MIN: CPT | Performed by: INTERNAL MEDICINE

## 2023-04-03 PROCEDURE — G8536 NO DOC ELDER MAL SCRN: HCPCS | Performed by: INTERNAL MEDICINE

## 2023-04-03 PROCEDURE — G0463 HOSPITAL OUTPT CLINIC VISIT: HCPCS | Performed by: INTERNAL MEDICINE

## 2023-04-03 PROCEDURE — G8427 DOCREV CUR MEDS BY ELIG CLIN: HCPCS | Performed by: INTERNAL MEDICINE

## 2023-04-03 PROCEDURE — G8420 CALC BMI NORM PARAMETERS: HCPCS | Performed by: INTERNAL MEDICINE

## 2023-04-03 RX ORDER — NIACIN 1000 MG/1
TABLET, EXTENDED RELEASE ORAL
Qty: 180 TABLET | Refills: 3 | Status: SHIPPED | OUTPATIENT
Start: 2023-04-03

## 2023-04-03 RX ORDER — ROSUVASTATIN CALCIUM 10 MG/1
10 TABLET, FILM COATED ORAL
Qty: 90 TABLET | Refills: 3 | Status: SHIPPED | OUTPATIENT
Start: 2023-04-03

## 2023-04-03 RX ORDER — ALPRAZOLAM 0.5 MG/1
TABLET ORAL
Qty: 90 TABLET | Refills: 1 | Status: SHIPPED | OUTPATIENT
Start: 2023-04-03

## 2023-04-03 NOTE — PROGRESS NOTES
Note   Chief Complaint   Follow up    Brad Earl is a 65 y.o. male     1. Essential hypertension  Assessment & Plan:  110s prior to end of march then it went to 130s-140s   Cards increased his lisinopril to 10  +orthostatic lightheadedness  Drinks a lot of water   Discussed potentially going back down on dose due to SE, but pt wants to defer med changes to cards. Cont lisinopril 10mg daily, metop 12.5mg XL daily, get up slowly  2. Anxiety  Assessment & Plan:   well controlled, continue current medications   Xanax 0.5mg prn  Orders:  -     ALPRAZolam (XANAX) 0.5 mg tablet; TAKE 1 TABLET NIGHTLY AS   NEEDED FOR ANXIETY. MAXIMUMDAILY AMOUNT: 0.5 MG, Normal, Disp-90 Tablet, R-1  3. Other hyperlipidemia  Assessment & Plan:   well controlled, continue current medications   crestor brand 10mg daily, niacin, fish oil  Orders:  -     niacin (NIASPAN) 1,000 mg Tb24 tab; TAKE 2 TABLETS NIGHTLY, Normal, Disp-180 Tablet, R-3  -     Crestor 10 mg tablet; Take 1 Tablet by mouth nightly., Normal, Disp-90 Tablet, R-3, CECE  4. Atherosclerosis of native coronary artery of native heart without angina pectoris  -     niacin (NIASPAN) 1,000 mg Tb24 tab; TAKE 2 TABLETS NIGHTLY, Normal, Disp-180 Tablet, R-3  -     Crestor 10 mg tablet; Take 1 Tablet by mouth nightly., Normal, Disp-90 Tablet, R-3, CECE  5. Paroxysmal atrial fibrillation (HCC)  Assessment & Plan:  Some dizziness after starting eliquis   Discussed potentially trying xarelto to see if tolerates better; he wants to defer changes to cards  Otherwise continue eliquis, metop 12.5mg daily   6. Bilateral hip pain  Assessment & Plan:  Lateral hip pain  Hurts when laying on the side   Intermittent  Possibly trochanter bursitis. Given exercises to try at home, doesn't bother him enough for further intervention  7.  Leg cramp  Assessment & Plan:   Leg cramps mostly in calves mostly in thigh   Sometimes gets a cramp when bending over in a chair to put on shoes  Advised to see if leg cramps associated with days he does pickleball, rec hydrating with Gatorade or something similar on those days  8. Thrombocytopenia (HCC)  Assessment & Plan:  CBC already ordered by Dr. Luciana Medrano, he plans to get it done this month     Benefits, risks, possible drug interactions, and side effects of all new medications were reviewed with the patient. Pt verbalized understanding. Return to clinic:  as needed, [facility]    An electronic signature was used to authenticate this note. Nae Roberts MD  [facility]  4/3/2023    Future Appointments   Date Time Provider Prakash Mckinley   6/1/2023  9:00 AM Ketty Villa MD [facility] BS AMB   11/9/2023  8:30 AM Glen RIOS MD [facility] BS AMB        Objective   Vitals:       Visit Vitals  /73 (BP 1 Location: Left upper arm, BP Patient Position: Sitting, BP Cuff Size: Small adult)   Pulse 66   Resp 14   Ht 5' 8\" (1.727 m)   Wt 144 lb 12.8 oz (65.7 kg)   SpO2 98%   BMI 22.02 kg/m²        Physical Exam  Constitutional:       Appearance: Normal appearance. He is not ill-appearing. Cardiovascular:      Rate and Rhythm: Normal rate and regular rhythm. Heart sounds: No murmur heard. No friction rub. No gallop. Pulmonary:      Effort: No respiratory distress. Breath sounds: Normal breath sounds. No wheezing, rhonchi or rales. Neurological:      Mental Status: He is alert. Current Outpatient Medications   Medication Sig    ALPRAZolam (XANAX) 0.5 mg tablet TAKE 1 TABLET NIGHTLY AS   NEEDED FOR ANXIETY. MAXIMUMDAILY AMOUNT: 0.5 MG    niacin (NIASPAN) 1,000 mg Tb24 tab TAKE 2 TABLETS NIGHTLY    Crestor 10 mg tablet Take 1 Tablet by mouth nightly. lisinopriL (PRINIVIL, ZESTRIL) 10 mg tablet Take 1 Tablet by mouth daily. apixaban (ELIQUIS) 5 mg tablet Take 1 Tablet by mouth two (2) times a day. metoprolol succinate (TOPROL-XL) 25 mg XL tablet Take 0.5 Tablets by mouth nightly. cholecalciferol (VITAMIN D3) (1000 Units /25 mcg) tablet Take 1,000 Units by mouth daily. folic acid/multivit-min/lutein (CENTRUM SILVER PO) Take 1 Tablet by mouth daily. polyethylene glycol (MIRALAX) 17 gram/dose powder Take 17 g by mouth daily. 1/2 as needed    omega-3 fatty acids-vitamin e 1,000 mg cap Take 1 Cap by mouth two (2) times a day. ascorbic acid, vitamin C, (VITAMIN C) 500 mg tablet Take 500 mg by mouth daily. ramelteon (ROZEREM) 8 mg tablet Take 8 mg by mouth nightly as needed for Sleep. No current facility-administered medications for this visit.

## 2023-04-03 NOTE — ASSESSMENT & PLAN NOTE
Leg cramps mostly in calves mostly in thigh   Sometimes gets a cramp when bending over in a chair to put on shoes  Advised to see if leg cramps associated with days he does pickleball, rec hydrating with Gatorade or something similar on those days

## 2023-04-03 NOTE — ASSESSMENT & PLAN NOTE
110s prior to end of march then it went to 130s-140s   Cards increased his lisinopril to 10  +orthostatic lightheadedness  Drinks a lot of water   Discussed potentially going back down on dose due to SE, but pt wants to defer med changes to cards.  Cont lisinopril 10mg daily, metop 12.5mg XL daily, get up slowly

## 2023-04-03 NOTE — ASSESSMENT & PLAN NOTE
Some dizziness after starting eliquis   Discussed potentially trying xarelto to see if tolerates better; he wants to defer changes to cards  Otherwise continue eliquis, metop 12.5mg daily

## 2023-04-03 NOTE — ASSESSMENT & PLAN NOTE
Lateral hip pain  Hurts when laying on the side   Intermittent  Possibly trochanter bursitis.   Given exercises to try at home, doesn't bother him enough for further intervention

## 2023-04-10 ENCOUNTER — TELEPHONE (OUTPATIENT)
Dept: INTERNAL MEDICINE CLINIC | Age: 78
End: 2023-04-10

## 2023-04-22 DIAGNOSIS — D69.6 THROMBOCYTOPENIA (HCC): Primary | ICD-10-CM

## 2023-04-23 DIAGNOSIS — D69.6 THROMBOCYTOPENIA (HCC): Primary | ICD-10-CM

## 2023-04-27 DIAGNOSIS — G47.00 INSOMNIA, UNSPECIFIED TYPE: Primary | ICD-10-CM

## 2023-04-27 RX ORDER — ZALEPLON 10 MG/1
10 CAPSULE ORAL
Qty: 30 CAPSULE | Refills: 0 | Status: SHIPPED | OUTPATIENT
Start: 2023-04-27

## 2023-04-28 LAB
BASOPHILS # BLD: 0 K/UL (ref 0–0.1)
BASOPHILS NFR BLD: 1 % (ref 0–1)
DIFFERENTIAL METHOD BLD: ABNORMAL
EOSINOPHIL # BLD: 0.1 K/UL (ref 0–0.4)
EOSINOPHIL NFR BLD: 1 % (ref 0–7)
ERYTHROCYTE [DISTWIDTH] IN BLOOD BY AUTOMATED COUNT: 12.2 % (ref 11.5–14.5)
HCT VFR BLD AUTO: 43.7 % (ref 36.6–50.3)
HGB BLD-MCNC: 14 G/DL (ref 12.1–17)
IMM GRANULOCYTES # BLD AUTO: 0 K/UL (ref 0–0.04)
IMM GRANULOCYTES NFR BLD AUTO: 0 % (ref 0–0.5)
LYMPHOCYTES # BLD: 1.6 K/UL (ref 0.8–3.5)
LYMPHOCYTES NFR BLD: 31 % (ref 12–49)
MCH RBC QN AUTO: 33.6 PG (ref 26–34)
MCHC RBC AUTO-ENTMCNC: 32 G/DL (ref 30–36.5)
MCV RBC AUTO: 104.8 FL (ref 80–99)
MONOCYTES # BLD: 0.7 K/UL (ref 0–1)
MONOCYTES NFR BLD: 13 % (ref 5–13)
NEUTS SEG # BLD: 2.8 K/UL (ref 1.8–8)
NEUTS SEG NFR BLD: 54 % (ref 32–75)
NRBC # BLD: 0 K/UL (ref 0–0.01)
NRBC BLD-RTO: 0 PER 100 WBC
PLATELET # BLD AUTO: 102 K/UL (ref 150–400)
PMV BLD AUTO: 11.4 FL (ref 8.9–12.9)
RBC # BLD AUTO: 4.17 M/UL (ref 4.1–5.7)
WBC # BLD AUTO: 5.1 K/UL (ref 4.1–11.1)

## 2023-05-01 DIAGNOSIS — G47.09 OTHER INSOMNIA: Primary | ICD-10-CM

## 2023-05-01 RX ORDER — DOXEPIN HYDROCHLORIDE 10 MG/1
10 CAPSULE ORAL
Qty: 30 CAPSULE | Refills: 5 | Status: SHIPPED | OUTPATIENT
Start: 2023-05-01

## 2023-05-21 RX ORDER — ALPRAZOLAM 0.5 MG/1
TABLET ORAL
COMMUNITY
Start: 2023-04-03

## 2023-05-21 RX ORDER — DOXEPIN HYDROCHLORIDE 10 MG/1
1 CAPSULE ORAL NIGHTLY
COMMUNITY
Start: 2023-05-01 | End: 2023-05-26 | Stop reason: SDUPTHER

## 2023-05-21 RX ORDER — ROSUVASTATIN CALCIUM 10 MG/1
1 TABLET, COATED ORAL NIGHTLY
COMMUNITY
Start: 2023-04-03

## 2023-05-21 RX ORDER — METOPROLOL SUCCINATE 25 MG/1
12.5 TABLET, EXTENDED RELEASE ORAL
COMMUNITY
Start: 2022-10-11 | End: 2023-06-14 | Stop reason: ALTCHOICE

## 2023-05-21 RX ORDER — ASCORBIC ACID 500 MG
500 TABLET ORAL DAILY
COMMUNITY

## 2023-05-21 RX ORDER — LISINOPRIL 10 MG/1
10 TABLET ORAL DAILY
COMMUNITY
Start: 2023-03-30

## 2023-05-21 RX ORDER — NIACIN 1000 MG
TABLET, EXTENDED RELEASE ORAL
COMMUNITY
Start: 2023-04-03 | End: 2023-06-14 | Stop reason: ALTCHOICE

## 2023-05-21 RX ORDER — POLYETHYLENE GLYCOL 3350 17 G/17G
17 POWDER, FOR SOLUTION ORAL DAILY
COMMUNITY

## 2023-05-25 NOTE — TELEPHONE ENCOUNTER
Requested Prescriptions     Pending Prescriptions Disp Refills    doxepin (SINEQUAN) 10 MG capsule 30 capsule 2     Sig: Take 1 capsule by mouth nightly       Allergies: Allergies   Allergen Reactions    Rosuvastatin Myalgia     Generic rosuvastatin causes myalgias       Current Outpatient Medications   Medication Instructions    ALPRAZolam (XANAX) 0.5 MG tablet TAKE 1 TABLET NIGHTLY AS   NEEDED FOR ANXIETY.  MAXIMUMDAILY AMOUNT: 0.5 MG    apixaban (ELIQUIS) 5 mg, Oral, 2 TIMES DAILY    ascorbic acid (VITAMIN C) 500 mg, Oral, DAILY    doxepin (SINEQUAN) 10 MG capsule 1 capsule, Oral, NIGHTLY    lisinopril (PRINIVIL;ZESTRIL) 10 mg, Oral, DAILY    metoprolol succinate (TOPROL XL) 12.5 mg, Oral    Niacin ER 1000 MG TBCR TAKE 2 TABLETS NIGHTLY    polyethylene glycol (GLYCOLAX) 17 g, Oral, DAILY    rosuvastatin (CRESTOR) 10 MG tablet 1 tablet, Oral, NIGHTLY    vitamin D (CHOLECALCIFEROL) 1,000 Units, Oral, DAILY       Signed by Alix Gomez CCT  05/25/23  3:42 PM

## 2023-05-26 RX ORDER — DOXEPIN HYDROCHLORIDE 10 MG/1
10 CAPSULE ORAL NIGHTLY
Qty: 30 CAPSULE | Refills: 2 | Status: SHIPPED | OUTPATIENT
Start: 2023-05-26

## 2023-06-01 RX ORDER — DOXEPIN HYDROCHLORIDE 10 MG/1
10 CAPSULE ORAL NIGHTLY
Qty: 90 CAPSULE | Refills: 3 | Status: SHIPPED | OUTPATIENT
Start: 2023-06-01

## 2023-09-19 ENCOUNTER — TELEPHONE (OUTPATIENT)
Age: 78
End: 2023-09-19

## 2023-09-19 NOTE — TELEPHONE ENCOUNTER
Patient called he has some questions about the labs he's needing to have done before his upcoming appt. Request a call back .        # [phone]

## 2023-09-19 NOTE — TELEPHONE ENCOUNTER
Francesco [address]  [phone]    09/19/23 11:34 AM EDT - Received call back from patient. Advised patient of NP recommendations. Patient voiced understanding.  No further questions at this time

## 2023-10-26 ENCOUNTER — TRANSCRIBE ORDERS (OUTPATIENT)
Facility: HOSPITAL | Age: 78
End: 2023-10-26

## 2023-10-26 ENCOUNTER — TELEPHONE (OUTPATIENT)
Age: 78
End: 2023-10-26

## 2023-10-26 DIAGNOSIS — Z12.2 SCREENING FOR MALIGNANT NEOPLASM OF RESPIRATORY ORGAN: Primary | ICD-10-CM

## 2023-10-26 NOTE — TELEPHONE ENCOUNTER
PT requesting to speak to the team to see what exactly he was seen for back in April.      # [phone]

## 2023-10-26 NOTE — TELEPHONE ENCOUNTER
10/26/23 1:51 PM Return call placed to patient. Verified patient ID x 2. Advised, per most recent office note, that Dr. Jazmin Montgomery sees patient for thrombocytopenia (or low platelet count). Reviewed most recent lab results for platelet counts and normal range per parameters of lab. Also reviewed upcoming appointment information. Faxed records, office notes and labs, to new PCP office per patient request. No further questions or concerns at this time.

## 2023-11-02 ENCOUNTER — HOSPITAL ENCOUNTER (OUTPATIENT)
Facility: HOSPITAL | Age: 78
Discharge: HOME | End: 2023-11-02
Payer: MEDICARE

## 2023-11-02 DIAGNOSIS — Z12.2 SCREENING FOR MALIGNANT NEOPLASM OF RESPIRATORY ORGAN: ICD-10-CM

## 2023-11-02 PROCEDURE — 71271 CT THORAX LUNG CANCER SCR C-: CPT

## 2023-12-28 ENCOUNTER — TELEPHONE (OUTPATIENT)
Age: 78
End: 2023-12-28

## 2024-03-08 RX ORDER — LISINOPRIL 10 MG/1
10 TABLET ORAL DAILY
Qty: 90 TABLET | Refills: 1 | Status: SHIPPED | OUTPATIENT
Start: 2024-03-08

## 2024-03-08 NOTE — TELEPHONE ENCOUNTER
Refill per VO of Dr. Godfrey Noe:    6/14/2023    No future appointments.    Requested Prescriptions     Pending Prescriptions Disp Refills    lisinopril (PRINIVIL;ZESTRIL) 10 MG tablet 90 tablet 1     Sig: Take 1 tablet by mouth daily
